# Patient Record
Sex: FEMALE | Race: WHITE | NOT HISPANIC OR LATINO
[De-identification: names, ages, dates, MRNs, and addresses within clinical notes are randomized per-mention and may not be internally consistent; named-entity substitution may affect disease eponyms.]

---

## 2024-01-02 ENCOUNTER — APPOINTMENT (OUTPATIENT)
Dept: RADIOLOGY | Facility: CLINIC | Age: 69
End: 2024-01-02
Payer: MEDICARE

## 2024-01-02 PROCEDURE — 74240 X-RAY XM UPR GI TRC 1CNTRST: CPT

## 2024-02-01 ENCOUNTER — APPOINTMENT (OUTPATIENT)
Dept: OBGYN | Facility: CLINIC | Age: 69
End: 2024-02-01

## 2024-02-15 ENCOUNTER — NON-APPOINTMENT (OUTPATIENT)
Age: 69
End: 2024-02-15

## 2024-03-07 ENCOUNTER — INPATIENT (INPATIENT)
Facility: HOSPITAL | Age: 69
LOS: 4 days | Discharge: HOME | DRG: 198 | End: 2024-03-12
Attending: INTERNAL MEDICINE | Admitting: INTERNAL MEDICINE
Payer: MEDICARE

## 2024-03-07 VITALS
HEIGHT: 63 IN | WEIGHT: 199.96 LBS | DIASTOLIC BLOOD PRESSURE: 83 MMHG | RESPIRATION RATE: 18 BRPM | SYSTOLIC BLOOD PRESSURE: 149 MMHG | TEMPERATURE: 98 F | HEART RATE: 121 BPM | OXYGEN SATURATION: 93 %

## 2024-03-07 DIAGNOSIS — M06.9 RHEUMATOID ARTHRITIS, UNSPECIFIED: ICD-10-CM

## 2024-03-07 DIAGNOSIS — G25.81 RESTLESS LEGS SYNDROME: ICD-10-CM

## 2024-03-07 DIAGNOSIS — E03.9 HYPOTHYROIDISM, UNSPECIFIED: ICD-10-CM

## 2024-03-07 DIAGNOSIS — J18.9 PNEUMONIA, UNSPECIFIED ORGANISM: ICD-10-CM

## 2024-03-07 DIAGNOSIS — K21.9 GASTRO-ESOPHAGEAL REFLUX DISEASE WITHOUT ESOPHAGITIS: ICD-10-CM

## 2024-03-07 DIAGNOSIS — Z29.9 ENCOUNTER FOR PROPHYLACTIC MEASURES, UNSPECIFIED: ICD-10-CM

## 2024-03-07 DIAGNOSIS — I10 ESSENTIAL (PRIMARY) HYPERTENSION: ICD-10-CM

## 2024-03-07 LAB
ADD ON TEST-SPECIMEN IN LAB: SIGNIFICANT CHANGE UP
ANION GAP SERPL CALC-SCNC: 15 MMOL/L — SIGNIFICANT CHANGE UP (ref 5–17)
BASOPHILS # BLD AUTO: 0.1 K/UL — SIGNIFICANT CHANGE UP (ref 0–0.2)
BASOPHILS NFR BLD AUTO: 1 % — SIGNIFICANT CHANGE UP (ref 0–2)
BUN SERPL-MCNC: 15 MG/DL — SIGNIFICANT CHANGE UP (ref 7–23)
CALCIUM SERPL-MCNC: 9.7 MG/DL — SIGNIFICANT CHANGE UP (ref 8.4–10.5)
CHLORIDE SERPL-SCNC: 102 MMOL/L — SIGNIFICANT CHANGE UP (ref 96–108)
CO2 SERPL-SCNC: 24 MMOL/L — SIGNIFICANT CHANGE UP (ref 22–31)
CREAT SERPL-MCNC: 0.83 MG/DL — SIGNIFICANT CHANGE UP (ref 0.5–1.3)
EGFR: 76 ML/MIN/1.73M2 — SIGNIFICANT CHANGE UP
EOSINOPHIL # BLD AUTO: 0 K/UL — SIGNIFICANT CHANGE UP (ref 0–0.5)
EOSINOPHIL NFR BLD AUTO: 0 % — SIGNIFICANT CHANGE UP (ref 0–6)
FLUAV AG NPH QL: SIGNIFICANT CHANGE UP
FLUBV AG NPH QL: SIGNIFICANT CHANGE UP
GLUCOSE SERPL-MCNC: 126 MG/DL — HIGH (ref 70–99)
HCT VFR BLD CALC: 44.8 % — SIGNIFICANT CHANGE UP (ref 34.5–45)
HGB BLD-MCNC: 14.2 G/DL — SIGNIFICANT CHANGE UP (ref 11.5–15.5)
LACTATE SERPL-SCNC: 1.6 MMOL/L — SIGNIFICANT CHANGE UP (ref 0.5–2)
LYMPHOCYTES # BLD AUTO: 1.42 K/UL — SIGNIFICANT CHANGE UP (ref 1–3.3)
LYMPHOCYTES # BLD AUTO: 14.4 % — SIGNIFICANT CHANGE UP (ref 13–44)
MCHC RBC-ENTMCNC: 28.2 PG — SIGNIFICANT CHANGE UP (ref 27–34)
MCHC RBC-ENTMCNC: 31.7 GM/DL — LOW (ref 32–36)
MCV RBC AUTO: 89.1 FL — SIGNIFICANT CHANGE UP (ref 80–100)
MONOCYTES # BLD AUTO: 0.38 K/UL — SIGNIFICANT CHANGE UP (ref 0–0.9)
MONOCYTES NFR BLD AUTO: 3.8 % — SIGNIFICANT CHANGE UP (ref 2–14)
NEUTROPHILS # BLD AUTO: 7.97 K/UL — HIGH (ref 1.8–7.4)
NEUTROPHILS NFR BLD AUTO: 80.8 % — HIGH (ref 43–77)
PLATELET # BLD AUTO: 466 K/UL — HIGH (ref 150–400)
POTASSIUM SERPL-MCNC: 4 MMOL/L — SIGNIFICANT CHANGE UP (ref 3.5–5.3)
POTASSIUM SERPL-SCNC: 4 MMOL/L — SIGNIFICANT CHANGE UP (ref 3.5–5.3)
PROCALCITONIN SERPL-MCNC: 0.1 NG/ML — SIGNIFICANT CHANGE UP (ref 0.02–0.1)
RAPID RVP RESULT: SIGNIFICANT CHANGE UP
RBC # BLD: 5.03 M/UL — SIGNIFICANT CHANGE UP (ref 3.8–5.2)
RBC # FLD: 21.5 % — HIGH (ref 10.3–14.5)
RSV RNA NPH QL NAA+NON-PROBE: SIGNIFICANT CHANGE UP
SARS-COV-2 RNA SPEC QL NAA+PROBE: SIGNIFICANT CHANGE UP
SARS-COV-2 RNA SPEC QL NAA+PROBE: SIGNIFICANT CHANGE UP
SODIUM SERPL-SCNC: 141 MMOL/L — SIGNIFICANT CHANGE UP (ref 135–145)
WBC # BLD: 9.87 K/UL — SIGNIFICANT CHANGE UP (ref 3.8–10.5)
WBC # FLD AUTO: 9.87 K/UL — SIGNIFICANT CHANGE UP (ref 3.8–10.5)

## 2024-03-07 PROCEDURE — 99285 EMERGENCY DEPT VISIT HI MDM: CPT

## 2024-03-07 PROCEDURE — 71275 CT ANGIOGRAPHY CHEST: CPT | Mod: 26,MC

## 2024-03-07 PROCEDURE — 93010 ELECTROCARDIOGRAM REPORT: CPT

## 2024-03-07 PROCEDURE — 71045 X-RAY EXAM CHEST 1 VIEW: CPT | Mod: 26

## 2024-03-07 RX ORDER — PIPERACILLIN AND TAZOBACTAM 4; .5 G/20ML; G/20ML
3.38 INJECTION, POWDER, LYOPHILIZED, FOR SOLUTION INTRAVENOUS ONCE
Refills: 0 | Status: COMPLETED | OUTPATIENT
Start: 2024-03-07 | End: 2024-03-07

## 2024-03-07 RX ORDER — SODIUM CHLORIDE 9 MG/ML
1600 INJECTION INTRAMUSCULAR; INTRAVENOUS; SUBCUTANEOUS ONCE
Refills: 0 | Status: COMPLETED | OUTPATIENT
Start: 2024-03-07 | End: 2024-03-07

## 2024-03-07 RX ORDER — GABAPENTIN 400 MG/1
600 CAPSULE ORAL ONCE
Refills: 0 | Status: COMPLETED | OUTPATIENT
Start: 2024-03-07 | End: 2024-03-07

## 2024-03-07 RX ORDER — CEFTRIAXONE 500 MG/1
2000 INJECTION, POWDER, FOR SOLUTION INTRAMUSCULAR; INTRAVENOUS ONCE
Refills: 0 | Status: COMPLETED | OUTPATIENT
Start: 2024-03-07 | End: 2024-03-07

## 2024-03-07 RX ORDER — PANTOPRAZOLE SODIUM 20 MG/1
40 TABLET, DELAYED RELEASE ORAL
Refills: 0 | Status: DISCONTINUED | OUTPATIENT
Start: 2024-03-07 | End: 2024-03-12

## 2024-03-07 RX ORDER — LOSARTAN POTASSIUM 100 MG/1
100 TABLET, FILM COATED ORAL EVERY 24 HOURS
Refills: 0 | Status: DISCONTINUED | OUTPATIENT
Start: 2024-03-08 | End: 2024-03-12

## 2024-03-07 RX ORDER — ENOXAPARIN SODIUM 100 MG/ML
40 INJECTION SUBCUTANEOUS EVERY 24 HOURS
Refills: 0 | Status: DISCONTINUED | OUTPATIENT
Start: 2024-03-08 | End: 2024-03-10

## 2024-03-07 RX ORDER — AMITRIPTYLINE HCL 25 MG
100 TABLET ORAL DAILY
Refills: 0 | Status: DISCONTINUED | OUTPATIENT
Start: 2024-03-07 | End: 2024-03-12

## 2024-03-07 RX ORDER — GABAPENTIN 400 MG/1
300 CAPSULE ORAL AT BEDTIME
Refills: 0 | Status: DISCONTINUED | OUTPATIENT
Start: 2024-03-07 | End: 2024-03-07

## 2024-03-07 RX ORDER — GABAPENTIN 400 MG/1
900 CAPSULE ORAL AT BEDTIME
Refills: 0 | Status: DISCONTINUED | OUTPATIENT
Start: 2024-03-08 | End: 2024-03-11

## 2024-03-07 RX ORDER — PIPERACILLIN AND TAZOBACTAM 4; .5 G/20ML; G/20ML
3.38 INJECTION, POWDER, LYOPHILIZED, FOR SOLUTION INTRAVENOUS EVERY 8 HOURS
Refills: 0 | Status: DISCONTINUED | OUTPATIENT
Start: 2024-03-08 | End: 2024-03-12

## 2024-03-07 RX ORDER — ACETAMINOPHEN 500 MG
1000 TABLET ORAL ONCE
Refills: 0 | Status: COMPLETED | OUTPATIENT
Start: 2024-03-07 | End: 2024-03-07

## 2024-03-07 RX ORDER — ACETAMINOPHEN 500 MG
650 TABLET ORAL EVERY 6 HOURS
Refills: 0 | Status: DISCONTINUED | OUTPATIENT
Start: 2024-03-07 | End: 2024-03-12

## 2024-03-07 RX ORDER — AZITHROMYCIN 500 MG/1
500 TABLET, FILM COATED ORAL ONCE
Refills: 0 | Status: COMPLETED | OUTPATIENT
Start: 2024-03-07 | End: 2024-03-07

## 2024-03-07 RX ORDER — LEVOTHYROXINE SODIUM 125 MCG
50 TABLET ORAL DAILY
Refills: 0 | Status: DISCONTINUED | OUTPATIENT
Start: 2024-03-08 | End: 2024-03-12

## 2024-03-07 RX ORDER — HYDROXYCHLOROQUINE SULFATE 200 MG
200 TABLET ORAL EVERY 12 HOURS
Refills: 0 | Status: DISCONTINUED | OUTPATIENT
Start: 2024-03-08 | End: 2024-03-09

## 2024-03-07 RX ORDER — ROPINIROLE 8 MG/1
1 TABLET, FILM COATED, EXTENDED RELEASE ORAL AT BEDTIME
Refills: 0 | Status: DISCONTINUED | OUTPATIENT
Start: 2024-03-07 | End: 2024-03-08

## 2024-03-07 RX ORDER — ONDANSETRON 8 MG/1
4 TABLET, FILM COATED ORAL EVERY 8 HOURS
Refills: 0 | Status: DISCONTINUED | OUTPATIENT
Start: 2024-03-07 | End: 2024-03-12

## 2024-03-07 RX ADMIN — SODIUM CHLORIDE 1600 MILLILITER(S): 9 INJECTION INTRAMUSCULAR; INTRAVENOUS; SUBCUTANEOUS at 16:03

## 2024-03-07 RX ADMIN — CEFTRIAXONE 100 MILLIGRAM(S): 500 INJECTION, POWDER, FOR SOLUTION INTRAMUSCULAR; INTRAVENOUS at 15:59

## 2024-03-07 RX ADMIN — PIPERACILLIN AND TAZOBACTAM 200 GRAM(S): 4; .5 INJECTION, POWDER, LYOPHILIZED, FOR SOLUTION INTRAVENOUS at 19:59

## 2024-03-07 RX ADMIN — GABAPENTIN 300 MILLIGRAM(S): 400 CAPSULE ORAL at 23:28

## 2024-03-07 RX ADMIN — Medication 400 MILLIGRAM(S): at 16:00

## 2024-03-07 RX ADMIN — AZITHROMYCIN 255 MILLIGRAM(S): 500 TABLET, FILM COATED ORAL at 16:28

## 2024-03-07 NOTE — ED ADULT NURSE NOTE - NSICDXPASTSURGICALHX_GEN_ALL_CORE_FT
PAST SURGICAL HISTORY:  Acquired absence of genital organ H/O oophorectomy    Endoscopy     lumpectomy left 3/11   right 2002     Other acquired absence of organ S/P cholecystectomy    Other postprocedural status H/O lumpectomy    Other postprocedural status cervical    Ovarian Cystectomy     S/P Cholecystectomy

## 2024-03-07 NOTE — H&P ADULT - PROBLEM SELECTOR PLAN 7
Plan:  F: s/p 1.6 L NS in the ED   E: replete K<4, Mg<2  N: regular  VTE Prophylaxis: None  GI: pantoprazole   C: Full Code  D: RMF

## 2024-03-07 NOTE — ED PROVIDER NOTE - CLINICAL SUMMARY MEDICAL DECISION MAKING FREE TEXT BOX
sick for a few weeks w pneumonia symptoms. two courses of antibiotics, not improving. had outpatient ct a week ago w multifocal pneumonia, no pe. seems to be getting worse. labs/cultures/cxr/cta ordered. discussed w dr. joya. given ceftriaxone/ azithro. wbc 9. cxr w left sided infiltrate. cta neg for pe but w multifocal pna. dr. joya consulted pulm who saw in ed. rec broaden coverage to zosyn. admit for further treatment

## 2024-03-07 NOTE — ED ADULT TRIAGE NOTE - CHIEF COMPLAINT QUOTE
Pt sent by MD Marin for further eval, diagnosed with L Lower lobe pneumonia 2/17, had CT done on 2/27 showing L upper lobe pnuemonia. Pt finished 2nd z-cody Monday. Denies cp, sob. PMH HTN. Ekg in progress.

## 2024-03-07 NOTE — CONSULT NOTE ADULT - ASSESSMENT
Ms. Schwartz is a 69 F with hx of RA (on plaquenil since NOv 2023), restless leg syndrome on chronic hydrocodone, HTN, goiter, UGIB in nov 2023, large hiatal hernia who comes in for fever, malaise, and nonproductive cough. She reports nonspecific symptoms since mid-February. She started taking PO augmentin and went to urgent care where a CXR was done and showed LLL pneumonia, small effusion on 2/16, s/p augmentin/azithromycin with no improvement. CTA Thorax done 2/27 w/ multifocal patchy infiltrates in the right lung. Received another course of antibiotics with cefdinir and now presents given lack of improvement of fevers, cough, malaise.     Pulmonary consulted given possible pneumonia refractory to 2 courses of antibiotics    # Community acquired pneumonia, LLL  - patient presents w/ LLL infiltrate on CXR 2/16 and mid lung patchy infiltrates on left on CTA 2/27 refractory to 2 courses of PO antibiotics. She has repeat CT scan pending here but CXR looks improved from prior  - febrile here, no leukocytosis, negative procalcitonin, no lactic acid elevation and no HARMONY  - will follow up CT findings for further recommendations but given lack of improvement with PO antibiotics, would provide broader coverage w/ IV antibiotics (IV zosyn), obtain urine strep, legionella, MRSA naris and sputum culture to help guide therapy. RVP/COVID/RSV testing, blood cultures and UA  - if infectious work up negative but with persistent CT scan findings, would start to think of broader differentials such as organizing pneumonia or underlying malignancy given her extensive 60 py smoking hx    will follow up CT scan for further recommendations   Ms. Schwartz is a 69 F with hx of RA (on plaquenil since NOv 2023), restless leg syndrome on chronic hydrocodone, HTN, goiter, UGIB in nov 2023, large hiatal hernia who comes in for fever, malaise, and nonproductive cough. She reports nonspecific symptoms since mid-February. She started taking PO augmentin and went to urgent care where a CXR was done and showed LLL pneumonia, small effusion on 2/16, s/p augmentin/azithromycin with no improvement. CTA Thorax done 2/27 w/ multifocal patchy infiltrates in the right lung. Received another course of antibiotics with cefdinir and now presents given lack of improvement of fevers, cough, malaise.     Pulmonary consulted given possible pneumonia refractory to 2 courses of antibiotics    # Community acquired pneumonia, LLL  - patient presents w/ LLL infiltrate on CXR 2/16 and mid lung patchy infiltrates on left on CTA 2/27 refractory to 2 courses of PO antibiotics.  CXR looks improved from prior. CTA thorax no PE but DAMIEN peripheral consolidation small  - febrile here, no leukocytosis, negative procalcitonin, no lactic acid elevation and no HARMONY  - Cannot compare CT findings to prior as we do not have access but reported left lower lobe findings appear improved. Given lack of improvement with PO antibiotics, would provide broader coverage w/ IV antibiotics (IV zosyn), obtain urine strep, legionella, MRSA naris and sputum culture to help guide therapy. RVP/COVID/RSV testing, blood cultures and UA  - if infectious work up negative but with persistent CT scan findings, would start to think of broader differentials such as organizing pneumonia or underlying malignancy given her extensive 60 py smoking hx. Would obtain ESR and CRP. Consider organizing pneumonia given peripheral consolidation finding in the DAMIEN   Ms. Schwartz is a 69 F with hx of RA (on plaquenil since NOv 2023), restless leg syndrome on chronic hydrocodone, HTN, goiter, UGIB in nov 2023, large hiatal hernia who comes in for fever, malaise, and nonproductive cough. She reports nonspecific symptoms since mid-February. She started taking PO augmentin and went to urgent care where a CXR was done and showed LLL pneumonia, small effusion on 2/16, s/p augmentin/azithromycin with no improvement. CTA Thorax done 2/27 w/ multifocal patchy infiltrates in the right lung. Received another course of antibiotics with cefdinir and now presents given lack of improvement of fevers, cough, malaise.     Pulmonary consulted given possible pneumonia refractory to 2 courses of antibiotics    # Community acquired pneumonia, DAMIEN  - patient presents w/ LLL infiltrate on CXR 2/16 and mid lung patchy infiltrates on left on CTA 2/27 refractory to 2 courses of PO antibiotics.  CXR looks improved from prior. CTA thorax no PE but DAMIEN peripheral consolidation small  - febrile here, no leukocytosis, negative procalcitonin, no lactic acid elevation and no HARMONY  - Cannot compare CT findings to prior as we do not have access but reported left lower lobe findings appear improved. Given lack of improvement with PO antibiotics, would provide broader coverage w/ IV antibiotics (IV zosyn), obtain urine strep, legionella, MRSA naris and sputum culture to help guide therapy. RVP/COVID/RSV testing, blood cultures and UA  - if infectious work up negative but with persistent CT scan findings, would start to think of broader differentials such as organizing pneumonia or underlying malignancy given her extensive 60 py smoking hx. Would obtain ESR and CRP. Consider organizing pneumonia given peripheral consolidation finding in the DAMIEN   Ms. Schwartz is a 69 F with hx of RA (on plaquenil since NOv 2023), restless leg syndrome on chronic hydrocodone, HTN, goiter, UGIB in nov 2023, large hiatal hernia who comes in for fever, malaise, and nonproductive cough. She reports nonspecific symptoms since mid-February. She started taking PO augmentin and went to urgent care where a CXR was done and showed LLL pneumonia, small effusion on 2/16, s/p augmentin/azithromycin with no improvement. CTA Thorax done 2/27 w/ multifocal patchy infiltrates in the right lung. Received another course of antibiotics with cefdinir and now presents given lack of improvement of fevers, cough, malaise.     Pulmonary consulted given possible pneumonia refractory to 2 courses of antibiotics    # Community acquired pneumonia, Lingula  - patient presents w/ LLL infiltrate on CXR 2/16 and mid lung patchy infiltrates on left on CTA 2/27 refractory to 2 courses of PO antibiotics.  CXR looks improved from prior. CTA thorax no PE but DAMIEN peripheral consolidation small  - febrile here, no leukocytosis, negative procalcitonin, no lactic acid elevation and no HARMONY  - Cannot compare CT findings to prior as we do not have access but reported left lower lobe findings appear improved. Given lack of improvement with PO antibiotics, would provide broader coverage w/ IV antibiotics (IV zosyn), obtain urine strep, legionella, MRSA naris and sputum culture to help guide therapy. RVP/COVID/RSV testing, blood cultures and UA  - if infectious work up negative with no symptomatic improvement w/ IV antibiotics but with persistent CT scan findings, would start to think of broader differentials such as organizing pneumonia or underlying malignancy given her extensive 60 py smoking hx. Would obtain ESR and CRP. Consider organizing pneumonia given peripheral consolidation finding in the DAMIEN   Ms. Schwartz is a 69 F with hx of RA (on plaquenil since NOv 2023), restless leg syndrome on chronic hydrocodone, HTN, goiter, UGIB in nov 2023, large hiatal hernia who comes in for fever, malaise, and nonproductive cough. She reports nonspecific symptoms since mid-February. She started taking PO augmentin and went to urgent care where a CXR was done and showed LLL pneumonia, small effusion on 2/16, s/p augmentin/azithromycin with no improvement. CTA Thorax done 2/27 w/ multifocal patchy infiltrates in the left lung. Received another course of antibiotics with cefdinir and now presents given lack of improvement of fevers, cough, malaise.     Pulmonary consulted given possible pneumonia refractory to 2 courses of antibiotics    Data Review:  negative RVP, COVID, RSV, Flu testing  CRP 5.2  procal 0.1  blood cultures NGTD  urine strep and legionella negative  MRSA naris pending    # Community acquired pneumonia, Lingula vs. Organizing Pneumonia  - patient presents w/ LLL infiltrate on CXR 2/16 and mid lung patchy infiltrates on left on CTA 2/27 refractory to 2 courses of PO antibiotics.  CXR looks improved from prior. CTA thorax no PE but DAMIEN peripheral consolidation small  - low grade fever here, no leukocytosis, negative procalcitonin, CRP 5.2 (slightly elevated)  - if infectious work up negative with no symptomatic improvement w/ IV antibiotics but with persistent CT scan findings, would start to think of broader differentials such as organizing pneumonia.   - will add patient on for bronchscospy with  BAL and transbronchial bx of Lingula on MONDAY  - obtain coags, NPO at midnight on Sunday night, hold DVT ppx monday AM    S/.D/E with Dr. Sood

## 2024-03-07 NOTE — PATIENT PROFILE ADULT - FALL HARM RISK - UNIVERSAL INTERVENTIONS
Bed in lowest position, wheels locked, appropriate side rails in place/Call bell, personal items and telephone in reach/Instruct patient to call for assistance before getting out of bed or chair/Non-slip footwear when patient is out of bed/[place] to call system/Physically safe environment - no spills, clutter or unnecessary equipment/Purposeful Proactive Rounding/Room/bathroom lighting operational, light cord in reach

## 2024-03-07 NOTE — H&P ADULT - HISTORY OF PRESENT ILLNESS
69F PMH RA (on plaquenil since 11/ 2023), restless leg syndrome on chronic hydrocodone, HTN, goiter, UGIB in nov 2023, hypothyroidism, presents with fever, malaise, nonproductive cough and SOB. She reports cough/SOB/mailaise started mid-February. Prescribed PO augmentin, CXR at urgent care: LLL pneumonia, small effusion. So far, she has take augmentin and azithromycin for 7 day course, followed by 7d course cefdinir which "kept her symptoms at bay" but otherwise no improvement. Presented to PCP Dr. Marin who recommended CTA thorax which was done 2/27 and showed no PE, multifocal patchy infiltrates in the left mid lung consistent with multifocal pneumonia.     Patient reports fevers 1d duration, no sick contacts,dry cough w/o productive sputum. Denies recent international travel.  LE swelling chronic R >L. She is not on any immunosupressive for RA other than plaquenil since Nov 2023 as she was was recently diagnosed. She has a 60 pack year smoking hx, quit 25 years ago, and no other lung hx. NO history of pneumoina in the past. NO weight loss or hemoptysis. Family hx of lung cancer in father. Family hx of DVT/PE in son.     ED:   VS: 100.2, , O2 93% on room air, /83.   Labs: WBC 9.8, H/H: 14/44, Plts 466, electrolytes wnl. lactate 1.6, RSV-  Imaging: CT PE: groundglass opacities and small consolidations in all 5 lobes, consolidative in L lobe  Intervention NS 1.6L bolus, ctx, azithromycin, zosyn

## 2024-03-07 NOTE — ED PROVIDER NOTE - OBJECTIVE STATEMENT
history of htn, ra, hypothyroid, restless leg syndrome, here with pneumonia/sob. Reports she started to feel sick 2/17 and went to urgent care. Diagnosed with pneumonia in LLL, treated with zpak/ amoxicillin. Completed 10 day course, didn't feel better. PMD Dr. Marin sent her for cta to r/o pe on 2/27 which was neg for pe but showed multifocal pneumonia left lung. Started new abx (thinks cefdinir) which she finished Sunday. Still not better, feeling sob, chest pains on left/ back, more with deep breath. Saw him again today and sent to ED. Had chills this am, no fever. Mild cough non productive.

## 2024-03-07 NOTE — H&P ADULT - NSHPPHYSICALEXAM_GEN_ALL_CORE
PHYSICAL EXAM:  Constitutional: NAD, comfortable in bed.  HEENT: NC/AT, PERRLA, MMM  Respiratory: CTA B/L. No w/r/r.   Cardiovascular: RRR, normal S1 and S2, no m/r/g.   Gastrointestinal: +BS, soft NTND, no guarding or rebound tenderness, no palpable masses   Extremities: wwp; 1+ pitting edema or RLE (chronic)   Neurological: AAOx3, no CN deficits, strength and sensation intact throughout.   Skin: No gross skin abnormalities or rashes

## 2024-03-07 NOTE — ED ADULT NURSE NOTE - NSICDXPASTMEDICALHX_GEN_ALL_CORE_FT
PAST MEDICAL HISTORY:  Brachial neuritis Cervical radiculitis    Disorder of thyroid low thyroid    GERD (Gastroesophageal Reflux Disease)     Goiter mulit nodular goiter.    Hiatal Hernia     HTN (Hypertension), Benign     Hypothyroid     Lung Nodule  followed up with CT SCAN yearly     Personal history of other diseases of circulatory system H/O: hypertension

## 2024-03-07 NOTE — CONSULT NOTE ADULT - SUBJECTIVE AND OBJECTIVE BOX
PULMONARY SERVICE INITIAL CONSULT NOTE    HPI:    Ms. Schwartz is a 69 F with hx of RA (on plaquenil since NOv 2023), restless leg syndrome on chronic hydrocodone, HTN, goiter, UGIB in nov 2023, large hiatal hernia who comes in for fever, malaise, and nonproductive cough. She reports nonspecific symptoms since mid-February. She started taking PO augmentin and went to urgent care where a CXR was done and showed LLL pneumonia, small effusion. She was started on augmentin and azithromycin for 7 day course at that time with no improvement in her symptoms. She then saw her PCP Dr. Marin who recommended CTA thorax which was done 2/27 and showed no PE, multifocal patchy infiltrates in the left mid lung consistent with multifocal pneumonia. She was started on an abx for 5 days (cefdinir) but had no improvement so he sent her in for evaluation.     Patient reports fevers, no sick contacts,dry cough w/o productive sputum. She reports chest discomfort on the left side. LE swelling chronic R >L. She is not on any immunosupressive for RA other than plaquenil since Nov 2023 as she was was recently diagnosed. She has a 60 pack year smoking hx, quit 25 years ago, and no other lung hx. NO history of pneumoina in the past. NO weight loss or hemoptysis.     In the ED, she was febrile 100.2, Tachycardic 118, saturating 93% on room air and /83. Blood work pending but no leukocytosis or lactic acidosis. CXR and CTA thorax pending.    Pulmonary is consulted for concern for pneumonia, refractory to 2 courses of antibiotics.     Does not follow pulmonary as outpatient. From [place] and [facility] Marin for second opinion.     REVIEW OF SYSTEMS:  10 point ROS negative aside from what is mentioned in HPI    PAST MEDICAL & SURGICAL HISTORY:  HTN (Hypertension), Benign      Hypothyroid      GERD (Gastroesophageal Reflux Disease)      Hiatal Hernia      Lung Nodule  followed up with CT SCAN yearly      Brachial neuritis  Cervical radiculitis      Goiter  mulit nodular goiter.      Disorder of thyroid  low thyroid      Personal history of other diseases of circulatory system  H/O: hypertension      S/P Cholecystectomy      lumpectomy left 3/11   right 2002      Endoscopy      Ovarian Cystectomy      Acquired absence of genital organ  H/O oophorectomy      Other postprocedural status  H/O lumpectomy      Other acquired absence of organ  S/P cholecystectomy      Other postprocedural status  cervical          FAMILY HISTORY:  Family history of diabetes mellitus  Family history of diabetes mellitus        SOCIAL HISTORY:  Smoking Status: [ ] Current, [ ] Former, [ ] Never  Pack Years:    MEDICATIONS:  Pulmonary:    Antimicrobials:  azithromycin  IVPB 500 milliGRAM(s) IV Intermittent once    Anticoagulants:    Onc:    GI/:    Endocrine:    Cardiac:    Other Medications:      Allergies    No Known Allergies    Intolerances        Vital Signs Last 24 Hrs  T(C): 37.9 (07 Mar 2024 15:17), Max: 37.9 (07 Mar 2024 15:17)  T(F): 100.2 (07 Mar 2024 15:17), Max: 100.2 (07 Mar 2024 15:17)  HR: 118 (07 Mar 2024 14:46) (118 - 118)  BP: 149/83 (07 Mar 2024 14:46) (149/83 - 149/83)  BP(mean): --  RR: 18 (07 Mar 2024 14:46) (18 - 18)  SpO2: 93% (07 Mar 2024 14:46) (93% - 93%)    Parameters below as of 07 Mar 2024 14:46  Patient On (Oxygen Delivery Method): room air              PHYSICAL EXAM:  Constitutional: well-appearing, in no apparent distress  Head: NC/AT  EENT: PERRL, anicteric sclera; oropharynx clear with moist mucous membranes  Neck: supple, ROM intact, no appreciable JVD or LAD  Respiratory: Lungs clear to auscultation bilaterally, no wheezes, rhonchi or rales  Cardiovascular: +S1/S2 intact, regular rhythm and rate. No murmurs appreciated  Gastrointestinal: soft, non-tender, non distended, bowel sounds normoactive   Extremities: no edema, clubbing or cyanosis  Vascular: 2+ radial pulses B/L  Neurological: awake and alert; oriented with no appreciable focal neuro defecits    LABS:      CBC Full  -  ( 07 Mar 2024 14:56 )  WBC Count : 9.87 K/uL  RBC Count : 5.03 M/uL  Hemoglobin : 14.2 g/dL  Hematocrit : 44.8 %  Platelet Count - Automated : 466 K/uL  Mean Cell Volume : 89.1 fl  Mean Cell Hemoglobin : 28.2 pg  Mean Cell Hemoglobin Concentration : 31.7 gm/dL  Auto Neutrophil # : x  Auto Lymphocyte # : x  Auto Monocyte # : x  Auto Eosinophil # : x  Auto Basophil # : x  Auto Neutrophil % : x  Auto Lymphocyte % : x  Auto Monocyte % : x  Auto Eosinophil % : x  Auto Basophil % : x                            RADIOLOGY & ADDITIONAL STUDIES:  CXR pending  CTA thorax pending PULMONARY SERVICE INITIAL CONSULT NOTE    HPI:    Ms. Schwartz is a 69 F with hx of RA (on plaquenil since NOv 2023), restless leg syndrome on chronic hydrocodone, HTN, goiter, UGIB in nov 2023, large hiatal hernia who comes in for fever, malaise, and nonproductive cough. She reports nonspecific symptoms since mid-February. She started taking PO augmentin and went to urgent care where a CXR was done and showed LLL pneumonia, small effusion. She was started on augmentin and azithromycin for 7 day course at that time with no improvement in her symptoms. She then saw her PCP Dr. Marin who recommended CTA thorax which was done 2/27 and showed no PE, multifocal patchy infiltrates in the left mid lung consistent with multifocal pneumonia. She was started on an abx for 5 days (cefdinir) but had no improvement so he sent her in for evaluation.     Patient reports fevers, no sick contacts,dry cough w/o productive sputum. She reports chest discomfort on the left side. LE swelling chronic R >L. She is not on any immunosupressive for RA other than plaquenil since Nov 2023 as she was was recently diagnosed. She has a 60 pack year smoking hx, quit 25 years ago, and no other lung hx. NO history of pneumoina in the past. NO weight loss or hemoptysis. Family hx of lung cancer in father. Family hx of DVT/PE in son at yougn age.     In the ED, she was febrile 100.2, Tachycardic 118, saturating 93% on room air and /83. Blood work pending but no leukocytosis or lactic acidosis. CXR and CTA thorax pending.    Pulmonary is consulted for concern for pneumonia, refractory to 2 courses of antibiotics.     Does not follow pulmonary as outpatient. From [place] and only up in [facility] to see Dr. Marin for second opinion.     REVIEW OF SYSTEMS:  10 point ROS negative aside from what is mentioned in HPI    PAST MEDICAL & SURGICAL HISTORY:  HTN (Hypertension), Benign  Hypothyroid  GERD (Gastroesophageal Reflux Disease)  Hiatal Hernia  Lung Nodule  followed up with CT SCAN yearly  Brachial neuritis  Cervical radiculitis  Goiter  mulit nodular goiter.  Disorder of thyroid  low thyroid  Personal history of other diseases of circulatory system  H/O: hypertension  S/P Cholecystectomy  lumpectomy left 3/11   right 2002    Endoscopy  Ovarian Cystectomy  Acquired absence of genital organ  H/O oophorectomy  Other postprocedural status  H/O lumpectomy  Other acquired absence of organ  S/P cholecystectomy  Other postprocedural status  cervical          FAMILY HISTORY:  Family history of diabetes mellitus  Family history of diabetes mellitus        SOCIAL HISTORY:  Smoking Status: [ ] Current, [x ] Former, [ ] Never  Pack Years:    MEDICATIONS:  Pulmonary:    Antimicrobials:  azithromycin  IVPB 500 milliGRAM(s) IV Intermittent once    Anticoagulants:    Onc:    GI/:    Endocrine:    Cardiac:    Other Medications:      Allergies    No Known Allergies    Intolerances        Vital Signs Last 24 Hrs  T(C): 37.9 (07 Mar 2024 15:17), Max: 37.9 (07 Mar 2024 15:17)  T(F): 100.2 (07 Mar 2024 15:17), Max: 100.2 (07 Mar 2024 15:17)  HR: 118 (07 Mar 2024 14:46) (118 - 118)  BP: 149/83 (07 Mar 2024 14:46) (149/83 - 149/83)  BP(mean): --  RR: 18 (07 Mar 2024 14:46) (18 - 18)  SpO2: 93% (07 Mar 2024 14:46) (93% - 93%)    Parameters below as of 07 Mar 2024 14:46  Patient On (Oxygen Delivery Method): room air        PHYSICAL EXAM:  Constitutional: well-appearing, in no apparent distress  Head: NC/AT  EENT: PERRL, anicteric sclera; oropharynx clear with moist mucous membranes  Neck: supple, ROM intact, no appreciable JVD or LAD  Respiratory: Lungs with crackles breath sounds in the right lower base. otherwise clear to auscultation bilaterally, no wheezes, rhonchi or rales  Cardiovascular: +S1/S2 intact, regular rhythm and rate. No murmurs appreciated  Gastrointestinal: soft, non-tender, non distended, bowel sounds normoactive   Extremities: no edema, clubbing or cyanosis  Vascular: 2+ radial pulses B/L  Neurological: awake and alert; oriented with no appreciable focal neuro defecits    LABS:  CBC Full  -  ( 07 Mar 2024 14:56 )  WBC Count : 9.87 K/uL  RBC Count : 5.03 M/uL  Hemoglobin : 14.2 g/dL  Hematocrit : 44.8 %  Platelet Count - Automated : 466 K/uL  Mean Cell Volume : 89.1 fl  Mean Cell Hemoglobin : 28.2 pg  Mean Cell Hemoglobin Concentration : 31.7 gm/dL  Auto Neutrophil # : x  Auto Lymphocyte # : x  Auto Monocyte # : x  Auto Eosinophil # : x  Auto Basophil # : x  Auto Neutrophil % : x  Auto Lymphocyte % : x  Auto Monocyte % : x  Auto Eosinophil % : x  Auto Basophil % : x                RADIOLOGY & ADDITIONAL STUDIES:  CXR pending read but LLL infiltrate appears improved compared to prior  CTA thorax pending PULMONARY SERVICE INITIAL CONSULT NOTE    HPI:    Ms. Schwartz is a 69 F with hx of RA (on plaquenil since NOv 2023), restless leg syndrome on chronic hydrocodone, HTN, goiter, UGIB in nov 2023, large hiatal hernia who comes in for fever, malaise, and nonproductive cough. She reports nonspecific symptoms since mid-February. She started taking PO augmentin and went to urgent care where a CXR was done and showed LLL pneumonia, small effusion. She was started on augmentin and azithromycin for 7 day course at that time with no improvement in her symptoms. She then saw her PCP Dr. Marin who recommended CTA thorax which was done 2/27 and showed no PE, multifocal patchy infiltrates in the left mid lung consistent with multifocal pneumonia. She was started on an abx for 5 days (cefdinir) but had no improvement so he sent her in for evaluation.     Patient reports fevers, no sick contacts,dry cough w/o productive sputum. She reports chest discomfort on the left side. LE swelling chronic R >L. She is not on any immunosupressive for RA other than plaquenil since Nov 2023 as she was was recently diagnosed. She has a 60 pack year smoking hx, quit 25 years ago, and no other lung hx. NO history of pneumoina in the past. NO weight loss or hemoptysis. Family hx of lung cancer in father. Family hx of DVT/PE in son at yougn age.     In the ED, she was febrile 100.2, Tachycardic 118, saturating 93% on room air and /83. Blood work pending but no leukocytosis or lactic acidosis. CXR and CTA thorax pending.    Pulmonary is consulted for concern for pneumonia, refractory to 2 courses of antibiotics.     Does not follow pulmonary as outpatient. [facility] [place] [facility].     REVIEW OF SYSTEMS:  10 point ROS negative aside from what is mentioned in HPI    PAST MEDICAL & SURGICAL HISTORY:  HTN (Hypertension), Benign  Hypothyroid  GERD (Gastroesophageal Reflux Disease)  Hiatal Hernia  Lung Nodule  followed up with CT SCAN yearly  Brachial neuritis  Cervical radiculitis  Goiter  mulit nodular goiter.  Disorder of thyroid  low thyroid  Personal history of other diseases of circulatory system  H/O: hypertension  S/P Cholecystectomy  lumpectomy left 3/11   right 2002    Endoscopy  Ovarian Cystectomy  Acquired absence of genital organ  H/O oophorectomy  Other postprocedural status  H/O lumpectomy  Other acquired absence of organ  S/P cholecystectomy  Other postprocedural status  cervical          FAMILY HISTORY:  Family history of diabetes mellitus  Family history of diabetes mellitus        SOCIAL HISTORY:  Smoking Status: [ ] Current, [x ] Former, [ ] Never  Pack Years:    MEDICATIONS:  Pulmonary:    Antimicrobials:  azithromycin  IVPB 500 milliGRAM(s) IV Intermittent once    Anticoagulants:    Onc:    GI/:    Endocrine:    Cardiac:    Other Medications:      Allergies    No Known Allergies    Intolerances        Vital Signs Last 24 Hrs  T(C): 37.9 (07 Mar 2024 15:17), Max: 37.9 (07 Mar 2024 15:17)  T(F): 100.2 (07 Mar 2024 15:17), Max: 100.2 (07 Mar 2024 15:17)  HR: 118 (07 Mar 2024 14:46) (118 - 118)  BP: 149/83 (07 Mar 2024 14:46) (149/83 - 149/83)  BP(mean): --  RR: 18 (07 Mar 2024 14:46) (18 - 18)  SpO2: 93% (07 Mar 2024 14:46) (93% - 93%)    Parameters below as of 07 Mar 2024 14:46  Patient On (Oxygen Delivery Method): room air        PHYSICAL EXAM:  Constitutional: well-appearing, in no apparent distress  Head: NC/AT  EENT: PERRL, anicteric sclera; oropharynx clear with moist mucous membranes  Neck: supple, ROM intact, no appreciable JVD or LAD  Respiratory: Lungs with crackles breath sounds in the right lower base. otherwise clear to auscultation bilaterally, no wheezes, rhonchi or rales  Cardiovascular: +S1/S2 intact, regular rhythm and rate. No murmurs appreciated  Gastrointestinal: soft, non-tender, non distended, bowel sounds normoactive   Extremities: no edema, clubbing or cyanosis  Vascular: 2+ radial pulses B/L  Neurological: awake and alert; oriented with no appreciable focal neuro defecits    LABS:  CBC Full  -  ( 07 Mar 2024 14:56 )  WBC Count : 9.87 K/uL  RBC Count : 5.03 M/uL  Hemoglobin : 14.2 g/dL  Hematocrit : 44.8 %  Platelet Count - Automated : 466 K/uL  Mean Cell Volume : 89.1 fl  Mean Cell Hemoglobin : 28.2 pg  Mean Cell Hemoglobin Concentration : 31.7 gm/dL  Auto Neutrophil # : x  Auto Lymphocyte # : x  Auto Monocyte # : x  Auto Eosinophil # : x  Auto Basophil # : x  Auto Neutrophil % : x  Auto Lymphocyte % : x  Auto Monocyte % : x  Auto Eosinophil % : x  Auto Basophil % : x                RADIOLOGY & ADDITIONAL STUDIES:  CXR pending read but LLL infiltrate appears improved compared to prior  CTA thorax no PE but has some mild GGO in upper lobes, left upper lobe peripheral consolidations PULMONARY SERVICE INITIAL CONSULT NOTE    HPI:    Ms. Schwartz is a 69 F with hx of RA (on plaquenil since NOv 2023), restless leg syndrome on chronic hydrocodone, HTN, goiter, UGIB in nov 2023, large hiatal hernia who comes in for fever, malaise, and nonproductive cough. She reports nonspecific symptoms since mid-February. She started taking PO augmentin and went to urgent care where a CXR was done and showed LLL pneumonia, small effusion. She was started on augmentin and azithromycin for 7 day course at that time with no improvement in her symptoms. She then saw her PCP Dr. Marin who recommended CTA thorax which was done 2/27 and showed no PE, multifocal patchy infiltrates in the left mid lung consistent with multifocal pneumonia. She was started on an abx for 5 days (cefdinir) but had no improvement so he sent her in for evaluation.     Patient reports fevers, no sick contacts,dry cough w/o productive sputum. She reports chest discomfort on the left side. LE swelling chronic R >L. She is not on any immunosupressive for RA other than plaquenil since Nov 2023 as she was was recently diagnosed. She has a 60 pack year smoking hx, quit 25 years ago, and no other lung hx. NO history of pneumoina in the past. NO weight loss or hemoptysis. Family hx of lung cancer in father. Family hx of DVT/PE in son at yougn age.     In the ED, she was febrile 100.2, Tachycardic 118, saturating 93% on room air and /83. Blood work pending but no leukocytosis or lactic acidosis. CXR and CTA thorax pending.    Pulmonary is consulted for concern for pneumonia, refractory to 2 courses of antibiotics.     Does not follow pulmonary as outpatient. From [place] and only up in [facility] to see Dr. Marin for second opinion.     REVIEW OF SYSTEMS:  10 point ROS negative aside from what is mentioned in HPI    PAST MEDICAL & SURGICAL HISTORY:  HTN (Hypertension), Benign  Hypothyroid  GERD (Gastroesophageal Reflux Disease)  Hiatal Hernia  Lung Nodule  followed up with CT SCAN yearly  Brachial neuritis  Cervical radiculitis  Goiter  mulit nodular goiter.  Disorder of thyroid  low thyroid  Personal history of other diseases of circulatory system  H/O: hypertension  S/P Cholecystectomy  lumpectomy left 3/11   right 2002    Endoscopy  Ovarian Cystectomy  Acquired absence of genital organ  H/O oophorectomy  Other postprocedural status  H/O lumpectomy  Other acquired absence of organ  S/P cholecystectomy  Other postprocedural status  cervical          FAMILY HISTORY:  Family history of diabetes mellitus  Family history of diabetes mellitus        SOCIAL HISTORY:  Smoking Status: [ ] Current, [x ] Former, [ ] Never  Pack Years:    MEDICATIONS:  Pulmonary:    Antimicrobials:  azithromycin  IVPB 500 milliGRAM(s) IV Intermittent once    Anticoagulants:    Onc:    GI/:    Endocrine:    Cardiac:    Other Medications:      Allergies    No Known Allergies    Intolerances        Vital Signs Last 24 Hrs  T(C): 37.9 (07 Mar 2024 15:17), Max: 37.9 (07 Mar 2024 15:17)  T(F): 100.2 (07 Mar 2024 15:17), Max: 100.2 (07 Mar 2024 15:17)  HR: 118 (07 Mar 2024 14:46) (118 - 118)  BP: 149/83 (07 Mar 2024 14:46) (149/83 - 149/83)  BP(mean): --  RR: 18 (07 Mar 2024 14:46) (18 - 18)  SpO2: 93% (07 Mar 2024 14:46) (93% - 93%)    Parameters below as of 07 Mar 2024 14:46  Patient On (Oxygen Delivery Method): room air        PHYSICAL EXAM:  Constitutional: well-appearing, in no apparent distress  Head: NC/AT  EENT: PERRL, anicteric sclera; oropharynx clear with moist mucous membranes  Neck: supple, ROM intact, no appreciable JVD or LAD  Respiratory: Lungs with crackles breath sounds in the right lower base. otherwise clear to auscultation bilaterally, no wheezes, rhonchi or rales  Cardiovascular: +S1/S2 intact, regular rhythm and rate. No murmurs appreciated  Gastrointestinal: soft, non-tender, non distended, bowel sounds normoactive   Extremities: no edema, clubbing or cyanosis  Vascular: 2+ radial pulses B/L  Neurological: awake and alert; oriented with no appreciable focal neuro defecits    LABS:  CBC Full  -  ( 07 Mar 2024 14:56 )  WBC Count : 9.87 K/uL  RBC Count : 5.03 M/uL  Hemoglobin : 14.2 g/dL  Hematocrit : 44.8 %  Platelet Count - Automated : 466 K/uL  Mean Cell Volume : 89.1 fl  Mean Cell Hemoglobin : 28.2 pg  Mean Cell Hemoglobin Concentration : 31.7 gm/dL  Auto Neutrophil # : x  Auto Lymphocyte # : x  Auto Monocyte # : x  Auto Eosinophil # : x  Auto Basophil # : x  Auto Neutrophil % : x  Auto Lymphocyte % : x  Auto Monocyte % : x  Auto Eosinophil % : x  Auto Basophil % : x                RADIOLOGY & ADDITIONAL STUDIES:  CXR pending read but LLL infiltrate appears improved compared to prior  CTA thorax no PE but has some mild GGO in upper lobes, left upper lobe peripheral consolidations, some peripheral consolidations in Right upper lobe as well new from prior report

## 2024-03-07 NOTE — H&P ADULT - ASSESSMENT
69F PMH RA (on plaquenil since 11/ 2023), restless leg syndrome on chronic hydrocodone, HTN, goiter, UGIB in nov 2023, hypothyroidism, presents with fever, malaise, nonproductive cough and SOB, admitted for multifocal PNA refractory to multiple abx regimens outpatient

## 2024-03-07 NOTE — H&P ADULT - PROBLEM SELECTOR PLAN 5
home meds: hydrocodone 10 mg. ropinirole 1 mg , gabapentin 300 mg qhs     -continue home meds. holding hydrocodone

## 2024-03-07 NOTE — ED ADULT NURSE NOTE - CHPI ED NUR RELIEVING FX
Orders in chart    ----- Message from Gretchen Gray sent at 12/15/2022  8:42 AM CST -----  Can you put in her lab orders, she's going tomorrow morning, alicia mckinney  Patient has a wellness appt here on 12/27/22  Thank you    
lying down

## 2024-03-07 NOTE — ED ADULT NURSE NOTE - OBJECTIVE STATEMENT
69YF pmhx lower lobe pneumonia progressed to upper L lobe. Pt completed both rounds of antibiotics without relief of symptoms. Pt currently reports pain with inspiration/expiration, SOB with exertion. Denies F/C, N/V/D.

## 2024-03-07 NOTE — H&P ADULT - NSICDXFAMILYHX_GEN_ALL_CORE_FT
FAMILY HISTORY:  Family history of diabetes mellitus, Family history of diabetes mellitus    Child  Still living? Unknown  Family history of pulmonary embolism, Age at diagnosis: Age Unknown

## 2024-03-07 NOTE — H&P ADULT - PROBLEM SELECTOR PLAN 1
~3 week sx of SOB, dry cough malaise and now with low grade fevers, PNA previously diagnosed 2/2024 and refractory to 2 courses outpatient (z-pack, cefdinir), not worse but w/o improvement. no wbc or bandemia, negative procal, negative RVP. Pulm following, suspicion for organizing PNA or malignancy given longstanding smoking hx however will need to evaluate if improved on broad spectrum first     -F/u Bcx   -IV Zosyn   -ESR/CRP  -Urine strep/legionella   -Pulm recs appreciated

## 2024-03-07 NOTE — ED ADULT NURSE REASSESSMENT NOTE - NS ED NURSE REASSESS COMMENT FT1
Spoke with  reporting the lab is not in possession of procalcitonin or bmp. This RN collected aforementioned blood work and had sent them with the other labs collected at that time. Will recollected blood work at this time.

## 2024-03-08 LAB
ALBUMIN SERPL ELPH-MCNC: 3.8 G/DL — SIGNIFICANT CHANGE UP (ref 3.3–5)
ALP SERPL-CCNC: 99 U/L — SIGNIFICANT CHANGE UP (ref 40–120)
ALT FLD-CCNC: 18 U/L — SIGNIFICANT CHANGE UP (ref 10–45)
ANION GAP SERPL CALC-SCNC: 8 MMOL/L — SIGNIFICANT CHANGE UP (ref 5–17)
APTT BLD: 36.5 SEC — HIGH (ref 24.5–35.6)
AST SERPL-CCNC: 16 U/L — SIGNIFICANT CHANGE UP (ref 10–40)
BASOPHILS # BLD AUTO: 0.03 K/UL — SIGNIFICANT CHANGE UP (ref 0–0.2)
BASOPHILS NFR BLD AUTO: 0.5 % — SIGNIFICANT CHANGE UP (ref 0–2)
BILIRUB SERPL-MCNC: <0.2 MG/DL — SIGNIFICANT CHANGE UP (ref 0.2–1.2)
BUN SERPL-MCNC: 12 MG/DL — SIGNIFICANT CHANGE UP (ref 7–23)
CALCIUM SERPL-MCNC: 8.5 MG/DL — SIGNIFICANT CHANGE UP (ref 8.4–10.5)
CHLORIDE SERPL-SCNC: 103 MMOL/L — SIGNIFICANT CHANGE UP (ref 96–108)
CO2 SERPL-SCNC: 29 MMOL/L — SIGNIFICANT CHANGE UP (ref 22–31)
CREAT SERPL-MCNC: 0.85 MG/DL — SIGNIFICANT CHANGE UP (ref 0.5–1.3)
CRP SERPL-MCNC: 5.2 MG/L — HIGH (ref 0–4)
EGFR: 74 ML/MIN/1.73M2 — SIGNIFICANT CHANGE UP
EOSINOPHIL # BLD AUTO: 0.24 K/UL — SIGNIFICANT CHANGE UP (ref 0–0.5)
EOSINOPHIL NFR BLD AUTO: 4.3 % — SIGNIFICANT CHANGE UP (ref 0–6)
ERYTHROCYTE [SEDIMENTATION RATE] IN BLOOD: 19 MM/HR — SIGNIFICANT CHANGE UP
GLUCOSE SERPL-MCNC: 95 MG/DL — SIGNIFICANT CHANGE UP (ref 70–99)
HCT VFR BLD CALC: 40.2 % — SIGNIFICANT CHANGE UP (ref 34.5–45)
HCV AB S/CO SERPL IA: 0.03 S/CO — SIGNIFICANT CHANGE UP
HCV AB SERPL-IMP: SIGNIFICANT CHANGE UP
HGB BLD-MCNC: 12.3 G/DL — SIGNIFICANT CHANGE UP (ref 11.5–15.5)
IMM GRANULOCYTES NFR BLD AUTO: 0.4 % — SIGNIFICANT CHANGE UP (ref 0–0.9)
INR BLD: 1.02 — SIGNIFICANT CHANGE UP (ref 0.85–1.18)
LEGIONELLA AG UR QL: NEGATIVE — SIGNIFICANT CHANGE UP
LYMPHOCYTES # BLD AUTO: 1.47 K/UL — SIGNIFICANT CHANGE UP (ref 1–3.3)
LYMPHOCYTES # BLD AUTO: 26.1 % — SIGNIFICANT CHANGE UP (ref 13–44)
MAGNESIUM SERPL-MCNC: 2.3 MG/DL — SIGNIFICANT CHANGE UP (ref 1.6–2.6)
MCHC RBC-ENTMCNC: 27.9 PG — SIGNIFICANT CHANGE UP (ref 27–34)
MCHC RBC-ENTMCNC: 30.6 GM/DL — LOW (ref 32–36)
MCV RBC AUTO: 91.2 FL — SIGNIFICANT CHANGE UP (ref 80–100)
MONOCYTES # BLD AUTO: 0.52 K/UL — SIGNIFICANT CHANGE UP (ref 0–0.9)
MONOCYTES NFR BLD AUTO: 9.2 % — SIGNIFICANT CHANGE UP (ref 2–14)
NEUTROPHILS # BLD AUTO: 3.35 K/UL — SIGNIFICANT CHANGE UP (ref 1.8–7.4)
NEUTROPHILS NFR BLD AUTO: 59.5 % — SIGNIFICANT CHANGE UP (ref 43–77)
NRBC # BLD: 0 /100 WBCS — SIGNIFICANT CHANGE UP (ref 0–0)
PHOSPHATE SERPL-MCNC: 3.6 MG/DL — SIGNIFICANT CHANGE UP (ref 2.5–4.5)
PLATELET # BLD AUTO: 347 K/UL — SIGNIFICANT CHANGE UP (ref 150–400)
POTASSIUM SERPL-MCNC: 3.8 MMOL/L — SIGNIFICANT CHANGE UP (ref 3.5–5.3)
POTASSIUM SERPL-SCNC: 3.8 MMOL/L — SIGNIFICANT CHANGE UP (ref 3.5–5.3)
PROT SERPL-MCNC: 6.8 G/DL — SIGNIFICANT CHANGE UP (ref 6–8.3)
PROTHROM AB SERPL-ACNC: 11.6 SEC — SIGNIFICANT CHANGE UP (ref 9.5–13)
RBC # BLD: 4.41 M/UL — SIGNIFICANT CHANGE UP (ref 3.8–5.2)
RBC # FLD: 21.5 % — HIGH (ref 10.3–14.5)
S PNEUM AG UR QL: NEGATIVE — SIGNIFICANT CHANGE UP
SODIUM SERPL-SCNC: 140 MMOL/L — SIGNIFICANT CHANGE UP (ref 135–145)
WBC # BLD: 5.63 K/UL — SIGNIFICANT CHANGE UP (ref 3.8–10.5)
WBC # FLD AUTO: 5.63 K/UL — SIGNIFICANT CHANGE UP (ref 3.8–10.5)

## 2024-03-08 PROCEDURE — 99222 1ST HOSP IP/OBS MODERATE 55: CPT

## 2024-03-08 RX ORDER — POTASSIUM CHLORIDE 20 MEQ
20 PACKET (EA) ORAL ONCE
Refills: 0 | Status: COMPLETED | OUTPATIENT
Start: 2024-03-08 | End: 2024-03-08

## 2024-03-08 RX ORDER — ROPINIROLE 8 MG/1
1 TABLET, FILM COATED, EXTENDED RELEASE ORAL ONCE
Refills: 0 | Status: COMPLETED | OUTPATIENT
Start: 2024-03-08 | End: 2024-03-08

## 2024-03-08 RX ORDER — ROPINIROLE 8 MG/1
1 TABLET, FILM COATED, EXTENDED RELEASE ORAL AT BEDTIME
Refills: 0 | Status: DISCONTINUED | OUTPATIENT
Start: 2024-03-08 | End: 2024-03-11

## 2024-03-08 RX ADMIN — Medication 200 MILLIGRAM(S): at 06:19

## 2024-03-08 RX ADMIN — ROPINIROLE 1 MILLIGRAM(S): 8 TABLET, FILM COATED, EXTENDED RELEASE ORAL at 03:10

## 2024-03-08 RX ADMIN — Medication 650 MILLIGRAM(S): at 01:53

## 2024-03-08 RX ADMIN — PANTOPRAZOLE SODIUM 40 MILLIGRAM(S): 20 TABLET, DELAYED RELEASE ORAL at 06:19

## 2024-03-08 RX ADMIN — ENOXAPARIN SODIUM 40 MILLIGRAM(S): 100 INJECTION SUBCUTANEOUS at 21:53

## 2024-03-08 RX ADMIN — Medication 30 MILLILITER(S): at 22:42

## 2024-03-08 RX ADMIN — LOSARTAN POTASSIUM 100 MILLIGRAM(S): 100 TABLET, FILM COATED ORAL at 06:19

## 2024-03-08 RX ADMIN — PIPERACILLIN AND TAZOBACTAM 25 GRAM(S): 4; .5 INJECTION, POWDER, LYOPHILIZED, FOR SOLUTION INTRAVENOUS at 00:19

## 2024-03-08 RX ADMIN — GABAPENTIN 900 MILLIGRAM(S): 400 CAPSULE ORAL at 21:53

## 2024-03-08 RX ADMIN — Medication 650 MILLIGRAM(S): at 07:50

## 2024-03-08 RX ADMIN — Medication 20 MILLIEQUIVALENT(S): at 10:54

## 2024-03-08 RX ADMIN — Medication 650 MILLIGRAM(S): at 22:52

## 2024-03-08 RX ADMIN — PIPERACILLIN AND TAZOBACTAM 25 GRAM(S): 4; .5 INJECTION, POWDER, LYOPHILIZED, FOR SOLUTION INTRAVENOUS at 06:19

## 2024-03-08 RX ADMIN — Medication 650 MILLIGRAM(S): at 07:19

## 2024-03-08 RX ADMIN — GABAPENTIN 600 MILLIGRAM(S): 400 CAPSULE ORAL at 00:00

## 2024-03-08 RX ADMIN — Medication 650 MILLIGRAM(S): at 21:52

## 2024-03-08 RX ADMIN — Medication 650 MILLIGRAM(S): at 02:45

## 2024-03-08 RX ADMIN — Medication 200 MILLIGRAM(S): at 17:18

## 2024-03-08 RX ADMIN — PIPERACILLIN AND TAZOBACTAM 25 GRAM(S): 4; .5 INJECTION, POWDER, LYOPHILIZED, FOR SOLUTION INTRAVENOUS at 21:53

## 2024-03-08 RX ADMIN — Medication 100 MILLIGRAM(S): at 10:54

## 2024-03-08 RX ADMIN — PIPERACILLIN AND TAZOBACTAM 25 GRAM(S): 4; .5 INJECTION, POWDER, LYOPHILIZED, FOR SOLUTION INTRAVENOUS at 14:09

## 2024-03-08 NOTE — DISCHARGE NOTE PROVIDER - NSDCFUADDAPPT_GEN_ALL_CORE_FT
(1) Please follow up with your Primary Care Provider, Dr. Kale Marin at [address] [place] [address] on 3/18/2024 at 12:30pm.    Appointment was scheduled by Ms. ANDRESSA Womack, Referral Coordinator.

## 2024-03-08 NOTE — PROGRESS NOTE ADULT - SUBJECTIVE AND OBJECTIVE BOX
Afeb overnight No SOB at rest but fells ant chest discomfort now not only related to deep breath  VS stable Afe In RR Better BS than yesterday Abd is soft

## 2024-03-08 NOTE — PROGRESS NOTE ADULT - PROBLEM SELECTOR PLAN 5
home meds: hydrocodone 10 mg. ropinirole 1 mg , gabapentin 300 mg qhs     -continue home meds. holding hydrocodone Home meds: hydrocodone 10mg. Ropinirole 1mg, gabapentin 300 mg qhs   - continue home meds. holding hydrocodone

## 2024-03-08 NOTE — PROGRESS NOTE ADULT - PROBLEM SELECTOR PLAN 1
~3 week sx of SOB, dry cough malaise and now with low grade fevers, PNA previously diagnosed 2/2024 and refractory to 2 courses outpatient (z-pack, cefdinir), not worse but w/o improvement. no wbc or bandemia, negative procal, negative RVP. Pulm following, suspicion for organizing PNA or malignancy given longstanding smoking hx however will need to evaluate if improved on broad spectrum first     -F/u Bcx   -IV Zosyn   -ESR/CRP  -Urine strep/legionella   -Pulm recs appreciated ~3 week sx of SOB, dry cough malaise and now with low grade fevers, PNA previously diagnosed 2/2024 and refractory to 2 courses outpatient (z-pack, cefdinir), not worse but w/o improvement. no wbc or bandemia, negative procal, negative RVP. Pulm following, suspicion for organizing PNA or malignancy given longstanding smoking hx however will need to evaluate if improved on broad spectrum first. Urine Legionella negative  - F/u Bcx   - continue with IV Zosyn 3.375g q8h  - f/u pulm rec  - plan for bronch on 3/11/24

## 2024-03-08 NOTE — PROGRESS NOTE ADULT - PROBLEM SELECTOR PLAN 6
home meds: Plaquenil 200 mg QD. recently diagnosed and started in 10/2023     -Continue home meds Home meds: Plaquenil 200 mg QD. Recently diagnosed and started in 10/2023   - continue home meds

## 2024-03-08 NOTE — PROGRESS NOTE ADULT - PROBLEM SELECTOR PLAN 7
Plan:  F: s/p 1.6 L NS in the ED   E: replete K<4, Mg<2  N: regular  VTE Prophylaxis: None  GI: pantoprazole   C: Full Code  D: RMF F: s/p 1.6 L NS in the ED   E: replete K<4, Mg<2  N: regular  VTE Prophylaxis: None  GI: pantoprazole   C: Full Code  D: F

## 2024-03-08 NOTE — DISCHARGE NOTE PROVIDER - NSDCCPCAREPLAN_GEN_ALL_CORE_FT
PRINCIPAL DISCHARGE DIAGNOSIS  Diagnosis: Multifocal pneumonia  Assessment and Plan of Treatment: You have pneumonia, which is an infection in your lungs. In the hospital, your health care providers gave you IV antibiotics to help your body get rid of the germs that cause pneumonia. They also made sure you got enough liquids and nutrients. Bronchoscopy was completed to further evaluate the underlying etiology. Detailed results to be reviewed with your outpatient health care provider once available.     PRINCIPAL DISCHARGE DIAGNOSIS  Diagnosis: Pneumonia  Assessment and Plan of Treatment: We think you have either community acquired pneumonia (CAP) or organizing pneumonia. For CAP, antibiotics are usually effective to treat the bacteria causing the pneumonia. And you completed the course if IV antibotics during this hospital stay. Organizing penumonia, on the other hand, is a repair process of the lung in response to preceding alveolar injury. It is not an infection, but rather refers to organized swirls of inflammatory tissue filling the small bronchioles and alveoli. When treated with corticosteroids, most people recover quickly. To have a definitively diagnosis, we will consulted pulmonologist, who completed the bronchoscopy with biopsy taken and sent for analysis. Please take the prednisone 60mg daily for 6 more days starting tomorrow, then 40mg daily until you see Dr. Marin and Dr. Pineda, who will provide instruction for you to taper the prednisone and review the final pathological result from the bronchoscopy.

## 2024-03-08 NOTE — PROGRESS NOTE ADULT - PROBLEM SELECTOR PLAN 2
Home meds: losartan 100 mg, hctz 25 qd     -Continue home meds Home meds: losartan 100 mg, HCTZ 25 qd   - Continue home meds

## 2024-03-08 NOTE — DISCHARGE NOTE PROVIDER - PROVIDER TOKENS
PROVIDER:[TOKEN:[[id number]],FOLLOWUP:[2 weeks]] PROVIDER:[TOKEN:[[id number]],SCHEDULEDAPPT:[03/25/2024],SCHEDULEDAPPTTIME:[01:00 PM]],PROVIDER:[TOKEN:[[id number]],SCHEDULEDAPPT:[03/26/2024],SCHEDULEDAPPTTIME:[09:00 AM]] PROVIDER:[TOKEN:[[id number]],SCHEDULEDAPPT:[03/18/2024],SCHEDULEDAPPTTIME:[12:30 PM]],PROVIDER:[TOKEN:[[id number]],SCHEDULEDAPPT:[03/26/2024],SCHEDULEDAPPTTIME:[09:00 AM]]

## 2024-03-08 NOTE — PROGRESS NOTE ADULT - SUBJECTIVE AND OBJECTIVE BOX
**INCOMPLETE NOTE    OVERNIGHT EVENTS:    SUBJECTIVE:  Patient seen and examined at bedside.  No fever, chills, dizziness, headache, changes in vision, chest pain, dyspnea, nausea or vomiting, dysuria, changes in bowel movements, LE edema. Rest of 12 point Review of systems negative unless otherwise documented elsewhere in note.     Vital Signs Last 12 Hrs  T(F): 97.7 (03-08-24 @ 05:51), Max: 98 (03-07-24 @ 21:34)  HR: 75 (03-08-24 @ 05:51) (75 - 106)  BP: 109/74 (03-08-24 @ 05:51) (109/74 - 141/88)  BP(mean): --  RR: 18 (03-08-24 @ 05:51) (18 - 18)  SpO2: 95% (03-08-24 @ 05:51) (92% - 98%)  I&O's Summary    07 Mar 2024 07:01  -  08 Mar 2024 07:00  --------------------------------------------------------  IN: 1950 mL / OUT: 0 mL / NET: 1950 mL        PHYSICAL EXAM:  Constitutional: NAD, comfortable in bed.  HEENT: NC/AT, PERRLA, EOMI, no conjunctival pallor or scleral icterus, MMM  Neck: Supple, no JVD  Respiratory: CTA B/L. No w/r/r.   Cardiovascular: RRR, normal S1 and S2, no m/r/g.   Gastrointestinal: +BS, soft NTND, no guarding or rebound tenderness, no palpable masses   Extremities: wwp; no cyanosis, clubbing or edema.   Vascular: Pulses equal and strong throughout.   Neurological: AAOx3, no CN deficits, strength and sensation intact throughout.   Skin: No gross skin abnormalities or rashes        LABS:                        14.2   9.87  )-----------( 466      ( 07 Mar 2024 14:56 )             44.8     03-07    141  |  102  |  15  ----------------------------<  126<H>  4.0   |  24  |  0.83    Ca    9.7      07 Mar 2024 14:56        RADIOLOGY & ADDITIONAL TESTS:    MEDICATIONS  (STANDING):  amitriptyline 100 milliGRAM(s) Oral daily  enoxaparin Injectable 40 milliGRAM(s) SubCutaneous every 24 hours  gabapentin 900 milliGRAM(s) Oral at bedtime  hydrochlorothiazide 25 milliGRAM(s) Oral daily  hydroxychloroquine 200 milliGRAM(s) Oral every 12 hours  levothyroxine 50 MICROGram(s) Oral daily  losartan 100 milliGRAM(s) Oral every 24 hours  pantoprazole    Tablet 40 milliGRAM(s) Oral before breakfast  piperacillin/tazobactam IVPB.. 3.375 Gram(s) IV Intermittent every 8 hours  rOPINIRole 1 milliGRAM(s) Oral at bedtime    MEDICATIONS  (PRN):  acetaminophen     Tablet .. 650 milliGRAM(s) Oral every 6 hours PRN Temp greater or equal to 38C (100.4F), Mild Pain (1 - 3)  ondansetron Injectable 4 milliGRAM(s) IV Push every 8 hours PRN Nausea and/or Vomiting   OVERNIGHT EVENTS: admitted and started on Zosyn    SUBJECTIVE: Patient seen and examined at bedside. Patient feels well without coughing but reports fever and worsening lung imaging after failing 2 courses of azithromycin. Patient is concerned about Hb level, because she had acute anemia which was thought to be due to unknown source of internal bleeding. She had previous scopes and capsule but negative for GI bleed. Reports difficulty emptying bladder in the past few days. No fever, chills, dizziness, headache, changes in vision, chest pain, dyspnea, nausea or vomiting, dysuria, changes in bowel movements, LE edema.    Vital Signs Last 12 Hrs  T(F): 97.7 (03-08-24 @ 05:51), Max: 98 (03-07-24 @ 21:34)  HR: 75 (03-08-24 @ 05:51) (75 - 106)  BP: 109/74 (03-08-24 @ 05:51) (109/74 - 141/88)  BP(mean): --  RR: 18 (03-08-24 @ 05:51) (18 - 18)  SpO2: 95% (03-08-24 @ 05:51) (92% - 98%)    I&O's Summary    07 Mar 2024 07:01  -  08 Mar 2024 07:00  --------------------------------------------------------  IN: 1950 mL / OUT: 0 mL / NET: 1950 mL        PHYSICAL EXAM:  Constitutional: NAD, comfortable in bed.  HEENT: NC/AT, PERRLA, EOMI, no conjunctival pallor or scleral icterus, MMM  Neck: Supple, no JVD  Respiratory: CTA B/L. No w/r/r.   Cardiovascular: RRR, normal S1 and S2, no m/r/g.   Gastrointestinal: +BS, soft NTND, no guarding or rebound tenderness, no palpable masses   Extremities: wwp; no cyanosis or clubbing. 1+ pitting edema or RLE (chronic)   Vascular: Pulses equal and strong throughout.   Neurological: AAOx3, no CN deficits, strength and sensation intact throughout.   Skin: No gross skin abnormalities or rashes      LABS:                        14.2   9.87  )-----------( 466      ( 07 Mar 2024 14:56 )             44.8     03-07    141  |  102  |  15  ----------------------------<  126<H>  4.0   |  24  |  0.83    Ca    9.7      07 Mar 2024 14:56      RADIOLOGY & ADDITIONAL TESTS:  < from: CT Angio Chest PE Protocol w/ IV Cont (03.07.24 @ 19:05) >  No pulmonary embolism through the level of the lobar and segmental pulmonary arteries. Evaluation of more distal subsegmental pulmonary   arteries is limited by suboptimal contrast bolus timing.   Multifocal pneumonia. Emphysema.   Enlarged multinodular left thyroid lobe.   Large hiatal hernia.    < from: Xray Chest 1 View- PORTABLE-Urgent (Xray Chest 1 View- PORTABLE-Urgent .) (03.07.24 @ 16:13) >  Left peripheral lower lung consolidation/pleural effusion decreased.   Left parenchymal nodules   Hiatal hernia.   Heart size within normal limits. Slight elevation of the left hemidiaphragm.   Cervicothoracic hardware      MEDICATIONS  (STANDING):  amitriptyline 100 milliGRAM(s) Oral daily  enoxaparin Injectable 40 milliGRAM(s) SubCutaneous every 24 hours  gabapentin 900 milliGRAM(s) Oral at bedtime  hydrochlorothiazide 25 milliGRAM(s) Oral daily  hydroxychloroquine 200 milliGRAM(s) Oral every 12 hours  levothyroxine 50 MICROGram(s) Oral daily  losartan 100 milliGRAM(s) Oral every 24 hours  pantoprazole    Tablet 40 milliGRAM(s) Oral before breakfast  piperacillin/tazobactam IVPB.. 3.375 Gram(s) IV Intermittent every 8 hours  rOPINIRole 1 milliGRAM(s) Oral at bedtime    MEDICATIONS  (PRN):  acetaminophen     Tablet .. 650 milliGRAM(s) Oral every 6 hours PRN Temp greater or equal to 38C (100.4F), Mild Pain (1 - 3)  ondansetron Injectable 4 milliGRAM(s) IV Push every 8 hours PRN Nausea and/or Vomiting

## 2024-03-08 NOTE — DISCHARGE NOTE PROVIDER - CARE PROVIDERS DIRECT ADDRESSES
,DirectAddress_Unknown ,DirectAddress_Unknown,[email or web address]

## 2024-03-08 NOTE — DISCHARGE NOTE PROVIDER - CARE PROVIDER_API CALL
Kale Marin  Internal Medicine  [address] [place] [address]  Phone: [phone]  Fax: [phone]  Follow Up Time: 2 weeks   Kale Marin  Internal Medicine  [address] [address]  Phone: [phone]  Fax: [phone]  Scheduled Appointment: 03/25/2024 01:00 PM    Jose Pineda  Pulmonary Disease  [address]  [place] [address]  Phone: [phone]  Fax: [phone]  Scheduled Appointment: 03/26/2024 09:00 AM   Kale Marin  Internal Medicine  [address] [address]  Phone: [phone]  Fax: [phone]  Scheduled Appointment: 03/18/2024 12:30 PM    Jose Pineda  Pulmonary Disease  [address]  [place] [address]  Phone: [phone]  Fax: [phone]  Scheduled Appointment: 03/26/2024 09:00 AM

## 2024-03-08 NOTE — DISCHARGE NOTE PROVIDER - HOSPITAL COURSE
#Discharge: do not delete    Patient is __ yo M/F with past medical history of _____  Presented with _____, found to have _____    Hospital course (by problem):     New medications:   Medications that were changed:   Medications that were stopped:    Items to follow up as outpatient:     Physical exam at discharge:   #Discharge: do not delete    69F PMH RA (on plaquenil since 11/ 2023), restless leg syndrome on chronic hydrocodone, HTN, goiter, UGIB in nov 2023, hypothyroidism, presents with fever, malaise, nonproductive cough and SOB, admitted for multifocal PNA refractory to multiple abx regimens outpatient     Hospital course (by problem):   #Multifocal pneumonia.   ~3 week sx of SOB, dry cough malaise and now with low grade fevers, PNA previously diagnosed 2/2024 and refractory to 2 courses outpatient (z-pack, cefdinir), not worse but w/o improvement. no wbc or bandemia, negative procal, negative RVP. Pulm following, suspicion for organizing PNA or malignancy given longstanding smoking hx however will need to evaluate if improved on broad spectrum first. Urine Legionella negative  - F/u Bcx - NGTD   - continue with IV Zosyn 3.375g q8h  - f/u pulm rec  - bronc w/ BAL and TBBx on 3/11/24  - will plan for discharge after gross path is back for abx guidance.    #Diarrhea.  Patient had 5 episodes of bowel movements on 3/9, also complains about a severe headache in the morning of 3/10. Home hydrocodone 10mg TID PRN restless leg syndrome on hold since admission. Etiology abx reaction vs withdrawal from opioids, less likely infectious. On 3/11 patient reports 6 episodes of small bowel movements, brown and formed.   - continue to monitor  - obtain GI PCR  - start lactobacillus 2 tabs TID.    #HTN (hypertension).  Home meds: losartan 100 mg, HCTZ 25 qd   - Continue home meds.    #Hypothyroid.  home med: synthroid 50 mcg   - Continue home meds.    #GERD (gastroesophageal reflux disease).  home med: dexlansoprazole 60 mg QD   - Continue w therapeutic interchange PPI.    #Restless legs syndrome (RLS).  Home meds: Hydrocodone/ acetaminophen 10mg-325mg TID PRN, Ropinirole 2mg qHS, Gabapentin 300mg 4 tabs qHS.   - continue home meds.    #Rheumatoid arthritis.  Home meds: Plaquenil 200 mg QD. Recently diagnosed and started in 10/2023   - hold Plaquenil in the setting of diarrhea.    New medications: Bactrim DS qD, Prednisone 60mg qD x 1wk then 40mg qD till follow up with PCP  Medications that were changed: none  Medications that were stopped: Plaquenil    Items to follow up as outpatient:   - follow up with Dr. Marin on 3/25/25 at 1pm  - follow up with Dr. Pineda  - bronchoscopy pathology result

## 2024-03-08 NOTE — DISCHARGE NOTE PROVIDER - NSDCMRMEDTOKEN_GEN_ALL_CORE_FT
amitriptyline 100 mg oral tablet: 1 tab(s) orally once a day  dexlansoprazole 30 mg oral delayed release capsule: 1 cap(s) orally once a day  gabapentin 300 mg oral tablet: orally once a day (at bedtime)  hydroCHLOROthiazide 25 mg oral tablet: 1 tab(s) orally once a day  HYDROcodone 10 mg oral capsule, extended release: 1 cap(s) orally once a day (at bedtime)  hydroxychloroquine 200 mg oral tablet: 1 tab(s) orally 2 times a day  losartan 100 mg oral tablet: 1 tab(s) orally once a day  rOPINIRole 1 mg oral tablet: 1 tab(s) orally once a day (at bedtime)  Synthroid 50 mcg (0.05 mg) oral tablet: 1 tab(s) orally once a day   amitriptyline 100 mg oral tablet: 1 tab(s) orally once a day  dexlansoprazole 60 mg oral delayed release capsule: 1 cap(s) orally once a day  gabapentin 300 mg oral capsule: 4 cap(s) orally once a day  hydroCHLOROthiazide 25 mg oral tablet: 1 tab(s) orally once a day  hydrocodone-acetaminophen 10 mg-325 mg oral tablet: 1 tab(s) orally 3 times a day as needed for Rest leg syndrome  hydroxychloroquine 200 mg oral tablet: 1 tab(s) orally 2 times a day  losartan 100 mg oral tablet: 1 tab(s) orally once a day  rOPINIRole 2 mg oral tablet: 1 tab(s) orally once a day (at bedtime)  Synthroid 50 mcg (0.05 mg) oral tablet: 1 tab(s) orally once a day   amitriptyline 100 mg oral tablet: 1 tab(s) orally once a day  dexlansoprazole 60 mg oral delayed release capsule: 1 cap(s) orally once a day  gabapentin 300 mg oral capsule: 4 cap(s) orally once a day  home pt: As prescribed  hydroCHLOROthiazide 25 mg oral tablet: 1 tab(s) orally once a day  hydrocodone-acetaminophen 10 mg-325 mg oral tablet: 1 tab(s) orally 3 times a day as needed for Rest leg syndrome  hydroxychloroquine 200 mg oral tablet: 1 tab(s) orally 2 times a day  losartan 100 mg oral tablet: 1 tab(s) orally once a day  predniSONE 2 mg oral delayed release tablet: 2 tab(s) orally once a day Please take 3 tablets a day for 6 days starting tomorrow, then take 2 tablets a day until you see Dr. Marin  rOPINIRole 2 mg oral tablet: 1 tab(s) orally once a day (at bedtime)  Synthroid 50 mcg (0.05 mg) oral tablet: 1 tab(s) orally once a day   amitriptyline 100 mg oral tablet: 1 tab(s) orally once a day  dexlansoprazole 60 mg oral delayed release capsule: 1 cap(s) orally once a day  gabapentin 300 mg oral capsule: 4 cap(s) orally once a day  home pt: As prescribed  hydroCHLOROthiazide 25 mg oral tablet: 1 tab(s) orally once a day  hydrocodone-acetaminophen 10 mg-325 mg oral tablet: 1 tab(s) orally 3 times a day as needed for Rest leg syndrome  hydroxychloroquine 200 mg oral tablet: 1 tab(s) orally 2 times a day  losartan 100 mg oral tablet: 1 tab(s) orally once a day  predniSONE 2 mg oral delayed release tablet: 2 tab(s) orally once a day Please take 3 tablets a day for 6 days starting tomorrow, then take 2 tablets a day until you see Dr. Marin  rOPINIRole 2 mg oral tablet: 1 tab(s) orally once a day (at bedtime)  sulfamethoxazole-trimethoprim 400 mg-80 mg oral tablet: 1 tab(s) orally once a day  Synthroid 50 mcg (0.05 mg) oral tablet: 1 tab(s) orally once a day   amitriptyline 100 mg oral tablet: 1 tab(s) orally once a day  dexlansoprazole 60 mg oral delayed release capsule: 1 cap(s) orally once a day  gabapentin 300 mg oral capsule: 4 cap(s) orally once a day  home pt: As prescribed  hydroCHLOROthiazide 25 mg oral tablet: 1 tab(s) orally once a day  hydrocodone-acetaminophen 10 mg-325 mg oral tablet: 1 tab(s) orally 3 times a day as needed for Rest leg syndrome  losartan 100 mg oral tablet: 1 tab(s) orally once a day  predniSONE 20 mg oral tablet: 3 tab(s) orally once a day Please take 3 tablets (60mg) for 6 days (last day 3/18). Take 2 tablets (40mg) thereafter (starting 3/19) until you follow up with Dr Pineda (pulmonologist).  rOPINIRole 2 mg oral tablet: 1 tab(s) orally once a day (at bedtime)  sulfamethoxazole-trimethoprim 400 mg-80 mg oral tablet: 1 tab(s) orally once a day  Synthroid 50 mcg (0.05 mg) oral tablet: 1 tab(s) orally once a day

## 2024-03-08 NOTE — PROGRESS NOTE ADULT - PROBLEM SELECTOR PLAN 4
home med: dexlansoprazole 30 mg QD     -Continue w therapeutic interchange ppi home med: dexlansoprazole 30 mg QD   - Continue w therapeutic interchange PPI

## 2024-03-09 LAB
ALBUMIN SERPL ELPH-MCNC: 4 G/DL — SIGNIFICANT CHANGE UP (ref 3.3–5)
ALP SERPL-CCNC: 105 U/L — SIGNIFICANT CHANGE UP (ref 40–120)
ALT FLD-CCNC: 18 U/L — SIGNIFICANT CHANGE UP (ref 10–45)
ANION GAP SERPL CALC-SCNC: 11 MMOL/L — SIGNIFICANT CHANGE UP (ref 5–17)
AST SERPL-CCNC: 15 U/L — SIGNIFICANT CHANGE UP (ref 10–40)
BASOPHILS # BLD AUTO: 0.05 K/UL — SIGNIFICANT CHANGE UP (ref 0–0.2)
BASOPHILS NFR BLD AUTO: 0.9 % — SIGNIFICANT CHANGE UP (ref 0–2)
BILIRUB SERPL-MCNC: <0.2 MG/DL — SIGNIFICANT CHANGE UP (ref 0.2–1.2)
BUN SERPL-MCNC: 16 MG/DL — SIGNIFICANT CHANGE UP (ref 7–23)
C DIFF GDH STL QL: NEGATIVE — SIGNIFICANT CHANGE UP
C DIFF GDH STL QL: SIGNIFICANT CHANGE UP
CALCIUM SERPL-MCNC: 8.6 MG/DL — SIGNIFICANT CHANGE UP (ref 8.4–10.5)
CHLORIDE SERPL-SCNC: 106 MMOL/L — SIGNIFICANT CHANGE UP (ref 96–108)
CO2 SERPL-SCNC: 25 MMOL/L — SIGNIFICANT CHANGE UP (ref 22–31)
CREAT SERPL-MCNC: 0.79 MG/DL — SIGNIFICANT CHANGE UP (ref 0.5–1.3)
EGFR: 80 ML/MIN/1.73M2 — SIGNIFICANT CHANGE UP
EOSINOPHIL # BLD AUTO: 0.26 K/UL — SIGNIFICANT CHANGE UP (ref 0–0.5)
EOSINOPHIL NFR BLD AUTO: 4.9 % — SIGNIFICANT CHANGE UP (ref 0–6)
GLUCOSE SERPL-MCNC: 132 MG/DL — HIGH (ref 70–99)
HCT VFR BLD CALC: 38.7 % — SIGNIFICANT CHANGE UP (ref 34.5–45)
HGB BLD-MCNC: 12.1 G/DL — SIGNIFICANT CHANGE UP (ref 11.5–15.5)
IMM GRANULOCYTES NFR BLD AUTO: 0.4 % — SIGNIFICANT CHANGE UP (ref 0–0.9)
LYMPHOCYTES # BLD AUTO: 1.74 K/UL — SIGNIFICANT CHANGE UP (ref 1–3.3)
LYMPHOCYTES # BLD AUTO: 32.5 % — SIGNIFICANT CHANGE UP (ref 13–44)
MAGNESIUM SERPL-MCNC: 2.2 MG/DL — SIGNIFICANT CHANGE UP (ref 1.6–2.6)
MCHC RBC-ENTMCNC: 27.9 PG — SIGNIFICANT CHANGE UP (ref 27–34)
MCHC RBC-ENTMCNC: 31.3 GM/DL — LOW (ref 32–36)
MCV RBC AUTO: 89.4 FL — SIGNIFICANT CHANGE UP (ref 80–100)
MONOCYTES # BLD AUTO: 0.39 K/UL — SIGNIFICANT CHANGE UP (ref 0–0.9)
MONOCYTES NFR BLD AUTO: 7.3 % — SIGNIFICANT CHANGE UP (ref 2–14)
NEUTROPHILS # BLD AUTO: 2.9 K/UL — SIGNIFICANT CHANGE UP (ref 1.8–7.4)
NEUTROPHILS NFR BLD AUTO: 54 % — SIGNIFICANT CHANGE UP (ref 43–77)
NRBC # BLD: 0 /100 WBCS — SIGNIFICANT CHANGE UP (ref 0–0)
PHOSPHATE SERPL-MCNC: 3.3 MG/DL — SIGNIFICANT CHANGE UP (ref 2.5–4.5)
PLATELET # BLD AUTO: 344 K/UL — SIGNIFICANT CHANGE UP (ref 150–400)
POTASSIUM SERPL-MCNC: 3.9 MMOL/L — SIGNIFICANT CHANGE UP (ref 3.5–5.3)
POTASSIUM SERPL-SCNC: 3.9 MMOL/L — SIGNIFICANT CHANGE UP (ref 3.5–5.3)
PROT SERPL-MCNC: 7 G/DL — SIGNIFICANT CHANGE UP (ref 6–8.3)
RBC # BLD: 4.33 M/UL — SIGNIFICANT CHANGE UP (ref 3.8–5.2)
RBC # FLD: 21.5 % — HIGH (ref 10.3–14.5)
SODIUM SERPL-SCNC: 142 MMOL/L — SIGNIFICANT CHANGE UP (ref 135–145)
WBC # BLD: 5.36 K/UL — SIGNIFICANT CHANGE UP (ref 3.8–10.5)
WBC # FLD AUTO: 5.36 K/UL — SIGNIFICANT CHANGE UP (ref 3.8–10.5)

## 2024-03-09 RX ADMIN — Medication 200 MILLIGRAM(S): at 06:15

## 2024-03-09 RX ADMIN — PIPERACILLIN AND TAZOBACTAM 25 GRAM(S): 4; .5 INJECTION, POWDER, LYOPHILIZED, FOR SOLUTION INTRAVENOUS at 21:27

## 2024-03-09 RX ADMIN — ENOXAPARIN SODIUM 40 MILLIGRAM(S): 100 INJECTION SUBCUTANEOUS at 21:26

## 2024-03-09 RX ADMIN — ROPINIROLE 1 MILLIGRAM(S): 8 TABLET, FILM COATED, EXTENDED RELEASE ORAL at 00:25

## 2024-03-09 RX ADMIN — PIPERACILLIN AND TAZOBACTAM 25 GRAM(S): 4; .5 INJECTION, POWDER, LYOPHILIZED, FOR SOLUTION INTRAVENOUS at 14:01

## 2024-03-09 RX ADMIN — GABAPENTIN 900 MILLIGRAM(S): 400 CAPSULE ORAL at 21:27

## 2024-03-09 RX ADMIN — PIPERACILLIN AND TAZOBACTAM 25 GRAM(S): 4; .5 INJECTION, POWDER, LYOPHILIZED, FOR SOLUTION INTRAVENOUS at 06:15

## 2024-03-09 RX ADMIN — Medication 50 MICROGRAM(S): at 06:14

## 2024-03-09 RX ADMIN — ROPINIROLE 1 MILLIGRAM(S): 8 TABLET, FILM COATED, EXTENDED RELEASE ORAL at 21:27

## 2024-03-09 RX ADMIN — PANTOPRAZOLE SODIUM 40 MILLIGRAM(S): 20 TABLET, DELAYED RELEASE ORAL at 06:15

## 2024-03-09 RX ADMIN — LOSARTAN POTASSIUM 100 MILLIGRAM(S): 100 TABLET, FILM COATED ORAL at 06:15

## 2024-03-09 RX ADMIN — Medication 100 MILLIGRAM(S): at 12:03

## 2024-03-10 ENCOUNTER — TRANSCRIPTION ENCOUNTER (OUTPATIENT)
Age: 69
End: 2024-03-10

## 2024-03-10 LAB
ANION GAP SERPL CALC-SCNC: 12 MMOL/L — SIGNIFICANT CHANGE UP (ref 5–17)
BUN SERPL-MCNC: 18 MG/DL — SIGNIFICANT CHANGE UP (ref 7–23)
CALCIUM SERPL-MCNC: 8.7 MG/DL — SIGNIFICANT CHANGE UP (ref 8.4–10.5)
CHLORIDE SERPL-SCNC: 104 MMOL/L — SIGNIFICANT CHANGE UP (ref 96–108)
CO2 SERPL-SCNC: 27 MMOL/L — SIGNIFICANT CHANGE UP (ref 22–31)
CREAT SERPL-MCNC: 0.69 MG/DL — SIGNIFICANT CHANGE UP (ref 0.5–1.3)
EGFR: 93 ML/MIN/1.73M2 — SIGNIFICANT CHANGE UP
GLUCOSE SERPL-MCNC: 92 MG/DL — SIGNIFICANT CHANGE UP (ref 70–99)
HCT VFR BLD CALC: 41 % — SIGNIFICANT CHANGE UP (ref 34.5–45)
HGB BLD-MCNC: 12.5 G/DL — SIGNIFICANT CHANGE UP (ref 11.5–15.5)
MCHC RBC-ENTMCNC: 28 PG — SIGNIFICANT CHANGE UP (ref 27–34)
MCHC RBC-ENTMCNC: 30.5 GM/DL — LOW (ref 32–36)
MCV RBC AUTO: 91.9 FL — SIGNIFICANT CHANGE UP (ref 80–100)
NRBC # BLD: 0 /100 WBCS — SIGNIFICANT CHANGE UP (ref 0–0)
PLATELET # BLD AUTO: 343 K/UL — SIGNIFICANT CHANGE UP (ref 150–400)
POTASSIUM SERPL-MCNC: 4 MMOL/L — SIGNIFICANT CHANGE UP (ref 3.5–5.3)
POTASSIUM SERPL-SCNC: 4 MMOL/L — SIGNIFICANT CHANGE UP (ref 3.5–5.3)
RBC # BLD: 4.46 M/UL — SIGNIFICANT CHANGE UP (ref 3.8–5.2)
RBC # FLD: 21.2 % — HIGH (ref 10.3–14.5)
SODIUM SERPL-SCNC: 143 MMOL/L — SIGNIFICANT CHANGE UP (ref 135–145)
WBC # BLD: 7.19 K/UL — SIGNIFICANT CHANGE UP (ref 3.8–10.5)
WBC # FLD AUTO: 7.19 K/UL — SIGNIFICANT CHANGE UP (ref 3.8–10.5)

## 2024-03-10 PROCEDURE — 99233 SBSQ HOSP IP/OBS HIGH 50: CPT | Mod: GC

## 2024-03-10 RX ORDER — ENOXAPARIN SODIUM 100 MG/ML
40 INJECTION SUBCUTANEOUS ONCE
Refills: 0 | Status: COMPLETED | OUTPATIENT
Start: 2024-03-10 | End: 2024-03-10

## 2024-03-10 RX ORDER — KETOROLAC TROMETHAMINE 30 MG/ML
15 SYRINGE (ML) INJECTION ONCE
Refills: 0 | Status: DISCONTINUED | OUTPATIENT
Start: 2024-03-10 | End: 2024-03-10

## 2024-03-10 RX ORDER — OXYCODONE HYDROCHLORIDE 5 MG/1
2.5 TABLET ORAL ONCE
Refills: 0 | Status: DISCONTINUED | OUTPATIENT
Start: 2024-03-10 | End: 2024-03-10

## 2024-03-10 RX ADMIN — PIPERACILLIN AND TAZOBACTAM 25 GRAM(S): 4; .5 INJECTION, POWDER, LYOPHILIZED, FOR SOLUTION INTRAVENOUS at 05:59

## 2024-03-10 RX ADMIN — PIPERACILLIN AND TAZOBACTAM 25 GRAM(S): 4; .5 INJECTION, POWDER, LYOPHILIZED, FOR SOLUTION INTRAVENOUS at 21:45

## 2024-03-10 RX ADMIN — GABAPENTIN 900 MILLIGRAM(S): 400 CAPSULE ORAL at 21:46

## 2024-03-10 RX ADMIN — Medication 650 MILLIGRAM(S): at 01:20

## 2024-03-10 RX ADMIN — Medication 15 MILLIGRAM(S): at 05:58

## 2024-03-10 RX ADMIN — ROPINIROLE 1 MILLIGRAM(S): 8 TABLET, FILM COATED, EXTENDED RELEASE ORAL at 21:46

## 2024-03-10 RX ADMIN — Medication 30 MILLILITER(S): at 21:45

## 2024-03-10 RX ADMIN — PIPERACILLIN AND TAZOBACTAM 25 GRAM(S): 4; .5 INJECTION, POWDER, LYOPHILIZED, FOR SOLUTION INTRAVENOUS at 15:23

## 2024-03-10 RX ADMIN — Medication 100 MILLIGRAM(S): at 12:52

## 2024-03-10 RX ADMIN — Medication 50 MICROGRAM(S): at 05:59

## 2024-03-10 RX ADMIN — OXYCODONE HYDROCHLORIDE 2.5 MILLIGRAM(S): 5 TABLET ORAL at 09:08

## 2024-03-10 RX ADMIN — PANTOPRAZOLE SODIUM 40 MILLIGRAM(S): 20 TABLET, DELAYED RELEASE ORAL at 06:02

## 2024-03-10 RX ADMIN — ENOXAPARIN SODIUM 40 MILLIGRAM(S): 100 INJECTION SUBCUTANEOUS at 19:27

## 2024-03-10 RX ADMIN — Medication 650 MILLIGRAM(S): at 01:55

## 2024-03-10 RX ADMIN — LOSARTAN POTASSIUM 100 MILLIGRAM(S): 100 TABLET, FILM COATED ORAL at 06:02

## 2024-03-10 RX ADMIN — Medication 15 MILLIGRAM(S): at 06:30

## 2024-03-10 NOTE — PROGRESS NOTE ADULT - SUBJECTIVE AND OBJECTIVE BOX
**INCOMPLETE NOTE    OVERNIGHT EVENTS:    SUBJECTIVE:  Patient seen and examined at bedside.  No fever, chills, dizziness, headache, changes in vision, chest pain, dyspnea, nausea or vomiting, dysuria, changes in bowel movements, LE edema. Rest of 12 point Review of systems negative unless otherwise documented elsewhere in note.     Vital Signs Last 12 Hrs  T(F): 97.4 (03-10-24 @ 06:06), Max: 98.1 (03-09-24 @ 20:59)  HR: 79 (03-10-24 @ 06:06) (79 - 91)  BP: 122/80 (03-10-24 @ 06:06) (120/81 - 122/80)  BP(mean): --  RR: 18 (03-10-24 @ 06:06) (18 - 18)  SpO2: 94% (03-10-24 @ 06:06) (93% - 94%)  I&O's Summary      PHYSICAL EXAM:  Constitutional: NAD, comfortable in bed.  HEENT: NC/AT, PERRLA, EOMI, no conjunctival pallor or scleral icterus, MMM  Neck: Supple, no JVD  Respiratory: CTA B/L. No w/r/r.   Cardiovascular: RRR, normal S1 and S2, no m/r/g.   Gastrointestinal: +BS, soft NTND, no guarding or rebound tenderness, no palpable masses   Extremities: wwp; no cyanosis, clubbing or edema.   Vascular: Pulses equal and strong throughout.   Neurological: AAOx3, no CN deficits, strength and sensation intact throughout.   Skin: No gross skin abnormalities or rashes        LABS:                        12.5   7.19  )-----------( 343      ( 10 Mar 2024 05:30 )             41.0     03-09    142  |  106  |  16  ----------------------------<  132<H>  3.9   |  25  |  0.79    Ca    8.6      09 Mar 2024 05:30  Phos  3.3     03-09  Mg     2.2     03-09    TPro  7.0  /  Alb  4.0  /  TBili  <0.2  /  DBili  x   /  AST  15  /  ALT  18  /  AlkPhos  105  03-09    PT/INR - ( 08 Mar 2024 12:00 )   PT: 11.6 sec;   INR: 1.02          PTT - ( 08 Mar 2024 12:00 )  PTT:36.5 sec  Urinalysis Basic - ( 09 Mar 2024 05:30 )    Color: x / Appearance: x / SG: x / pH: x  Gluc: 132 mg/dL / Ketone: x  / Bili: x / Urobili: x   Blood: x / Protein: x / Nitrite: x   Leuk Esterase: x / RBC: x / WBC x   Sq Epi: x / Non Sq Epi: x / Bacteria: x          RADIOLOGY & ADDITIONAL TESTS:    MEDICATIONS  (STANDING):  amitriptyline 100 milliGRAM(s) Oral daily  enoxaparin Injectable 40 milliGRAM(s) SubCutaneous every 24 hours  gabapentin 900 milliGRAM(s) Oral at bedtime  hydrochlorothiazide 25 milliGRAM(s) Oral daily  levothyroxine 50 MICROGram(s) Oral daily  losartan 100 milliGRAM(s) Oral every 24 hours  pantoprazole    Tablet 40 milliGRAM(s) Oral before breakfast  piperacillin/tazobactam IVPB.. 3.375 Gram(s) IV Intermittent every 8 hours  rOPINIRole 1 milliGRAM(s) Oral at bedtime    MEDICATIONS  (PRN):  acetaminophen     Tablet .. 650 milliGRAM(s) Oral every 6 hours PRN Temp greater or equal to 38C (100.4F), Mild Pain (1 - 3)  ondansetron Injectable 4 milliGRAM(s) IV Push every 8 hours PRN Nausea and/or Vomiting   OVERNIGHT EVENTS: Toradol 15 IV for H/A    SUBJECTIVE: Patient seen and examined at bedside. Still in severe headache 8/10. Patient takes hydrocodone 10mg qHS at home for restless leg syndrome. Still with frequent bowel movements (5 times yesterday, 1 early this morning). No fever, chills, dizziness, changes in vision, chest pain, dyspnea, nausea or vomiting, dysuria.     Vital Signs Last 12 Hrs  T(F): 97.4 (03-10-24 @ 06:06), Max: 98.1 (03-09-24 @ 20:59)  HR: 79 (03-10-24 @ 06:06) (79 - 91)  BP: 122/80 (03-10-24 @ 06:06) (120/81 - 122/80)  BP(mean): --  RR: 18 (03-10-24 @ 06:06) (18 - 18)  SpO2: 94% (03-10-24 @ 06:06) (93% - 94%)      PHYSICAL EXAM:  Constitutional: NAD, comfortable in bed.  HEENT: NC/AT, PERRLA, EOMI, no conjunctival pallor or scleral icterus, MMM  Neck: Supple, no JVD  Respiratory: CTA B/L. No w/r/r.   Cardiovascular: RRR, normal S1 and S2, no m/r/g.   Gastrointestinal: +BS, soft NTND, no guarding or rebound tenderness, no palpable masses   Extremities: wwp; no cyanosis or clubbing. 1+ pitting edema or RLE (chronic)   Vascular: Pulses equal and strong throughout.   Neurological: AAOx3, no CN deficits, strength and sensation intact throughout.   Skin: No gross skin abnormalities or rashes      LABS:                        12.5   7.19  )-----------( 343      ( 10 Mar 2024 05:30 )             41.0     03-09    142  |  106  |  16  ----------------------------<  132<H>  3.9   |  25  |  0.79    Ca    8.6      09 Mar 2024 05:30  Phos  3.3     03-09  Mg     2.2     03-09    TPro  7.0  /  Alb  4.0  /  TBili  <0.2  /  DBili  x   /  AST  15  /  ALT  18  /  AlkPhos  105  03-09    PT/INR - ( 08 Mar 2024 12:00 )   PT: 11.6 sec;   INR: 1.02          PTT - ( 08 Mar 2024 12:00 )  PTT:36.5 sec  Urinalysis Basic - ( 09 Mar 2024 05:30 )    Color: x / Appearance: x / SG: x / pH: x  Gluc: 132 mg/dL / Ketone: x  / Bili: x / Urobili: x   Blood: x / Protein: x / Nitrite: x   Leuk Esterase: x / RBC: x / WBC x   Sq Epi: x / Non Sq Epi: x / Bacteria: x          RADIOLOGY & ADDITIONAL TESTS:    MEDICATIONS  (STANDING):  amitriptyline 100 milliGRAM(s) Oral daily  enoxaparin Injectable 40 milliGRAM(s) SubCutaneous every 24 hours  gabapentin 900 milliGRAM(s) Oral at bedtime  hydrochlorothiazide 25 milliGRAM(s) Oral daily  levothyroxine 50 MICROGram(s) Oral daily  losartan 100 milliGRAM(s) Oral every 24 hours  pantoprazole    Tablet 40 milliGRAM(s) Oral before breakfast  piperacillin/tazobactam IVPB.. 3.375 Gram(s) IV Intermittent every 8 hours  rOPINIRole 1 milliGRAM(s) Oral at bedtime    MEDICATIONS  (PRN):  acetaminophen     Tablet .. 650 milliGRAM(s) Oral every 6 hours PRN Temp greater or equal to 38C (100.4F), Mild Pain (1 - 3)  ondansetron Injectable 4 milliGRAM(s) IV Push every 8 hours PRN Nausea and/or Vomiting

## 2024-03-10 NOTE — PHARMACY COMMUNICATION NOTE - COMMENTS
Admission medication  -	Amitriptyline 100mg daily  -	Dexlansoprazole 60mg DR daily  -	Gabapentin 300mg 4 cap QHS  -	Hydrochlorothiazide 25mg daily  -	Hydrocodone/ acetaminophen 10mg-325mg TID for pain (she said it’s for restless of leg, she doesn’t need it now)  -	Hydroxychloroquine 200mg BID  -	Losartan 100mg daily  -	Ropinirole 2mg QHS (patient said she was on 2mg 2tab QHS, but the medication made her feel nausea so she cut it down to 2mg QHS)  -	Levothyroxine 50mcg daily  -	Rivaroxaban 20mg 1tab prn (she said she only took it before and after a long flight due to family history of PE)  -	Ozempic 0.5mg SQ QW for weight loss  Spoke to patient to confirm home medication  -	NOT NEED Albuterol 90mcg inhaler 2 puffs prn Q6H for shortness of breath due to pneumonia (patient said she don’t need it now)

## 2024-03-10 NOTE — PROGRESS NOTE ADULT - PROBLEM SELECTOR PLAN 2
Home meds: losartan 100 mg, HCTZ 25 qd   - Continue home meds Patient had 5 episodes of diarrhea 3/9. C. diff negative. Patient complains about a severe headache in the morning of 3/10. Home hydrocodone 10mg TID PRN restless leg syndrome on hold since admission. Etiology abx reaction vs withdrawal from opioids, less likely infectious.  - continue to monitor  - consider GI PCR and further workup if continues or worsens

## 2024-03-10 NOTE — PROGRESS NOTE ADULT - ASSESSMENT
Ms. Schwartz is a 69 F with hx of RA (on plaquenil since NOv 2023), restless leg syndrome on chronic hydrocodone, HTN, goiter, UGIB in nov 2023, large hiatal hernia who comes in for fever, malaise, and nonproductive cough. She reports nonspecific symptoms since mid-February. She started taking PO augmentin and went to urgent care where a CXR was done and showed LLL pneumonia, small effusion on 2/16, s/p augmentin/azithromycin with no improvement. CTA Thorax done 2/27 w/ multifocal patchy infiltrates in the left lung. Received another course of antibiotics with cefdinir and now presents given lack of improvement of fevers, cough, malaise.     Pulmonary consulted given possible pneumonia refractory to 2 courses of antibiotics    Data Review:  negative RVP, COVID, RSV, Flu testing  CRP 5.2  procal 0.1  blood cultures NGTD  urine strep and legionella negative  MRSA naris pending    # Community acquired pneumonia, Lingula vs. Organizing Pneumonia  - patient presents w/ LLL infiltrate on CXR 2/16 and mid lung patchy infiltrates on left on CTA 2/27 refractory to 2 courses of PO antibiotics.  CXR looks improved from prior. CTA thorax no PE but DAMIEN peripheral consolidation small  - low grade fever here, no leukocytosis, negative procalcitonin, CRP 5.2 (slightly elevated)  - if infectious work up negative with no symptomatic improvement w/ IV antibiotics but with persistent CT scan findings, would start to think of broader differentials such as organizing pneumonia.   - Plan for bronchscospy with  BAL and transbronchial bx of Lingula on MONDAY 03/11/24  - obtain coags, NPO at midnight, hold DVT ppx monday AM     69 F with hx of RA (on plaquenil since 11/2023), restless leg syndrome on chronic hydrocodone, HTN, goiter, UGIB in nov 2023, large hiatal hernia who comes in for fever, malaise, and nonproductive cough. She reports nonspecific symptoms since mid-February. She started taking PO augmentin and went to urgent care where a CXR was done and showed LLL pneumonia, small effusion on 2/16, s/p augmentin/azithromycin with no improvement. CTA Thorax done 2/27 w/ multifocal patchy infiltrates in the left lung. Received another course of antibiotics with cefdinir and now presents given lack of improvement of fevers, cough, malaise.     Pulmonary consulted given possible pneumonia refractory to 2 courses of antibiotics    Data Review:  negative RVP, COVID, RSV, Flu testing  CRP 5.2  procal 0.1  blood cultures NGTD  urine strep and legionella negative    # Community acquired pneumonia, Lingula vs. Organizing Pneumonia  - patient presents w/ LLL infiltrate on CXR 2/16 and mid lung patchy infiltrates on left on CTA 2/27 refractory to 2 courses of PO antibiotics.  CXR looks improved from prior. CTA thorax no PE but DAMIEN peripheral consolidation small  - low grade fever here, no leukocytosis, negative procalcitonin, CRP 5.2 (slightly elevated)  - if infectious work up negative with no symptomatic improvement w/ IV antibiotics but with persistent CT scan findings, would start to think of broader differentials such as organizing pneumonia.   - Plan for bronchscospy with  BAL and transbronchial bx of Lingula on MONDAY 03/11/24  - Obtain coags, NPO at midnight, hold DVT ppx monday AM     69 F with hx of RA (on plaquenil since 11/2023), restless leg syndrome on chronic hydrocodone, HTN, goiter, UGIB in nov 2023, large hiatal hernia who comes in for fever, malaise, and nonproductive cough. She reports nonspecific symptoms since mid-February. She started taking PO augmentin and went to urgent care where a CXR was done and showed LLL pneumonia, small effusion on 2/16, s/p augmentin/azithromycin with no improvement. CTA Thorax done 2/27 w/ multifocal patchy infiltrates in the left lung. Received another course of antibiotics with cefdinir and now presents given lack of improvement of fevers, cough, malaise.     Pulmonary consulted given possible pneumonia refractory to 2 courses of antibiotics    Data Review:  negative RVP, COVID, RSV, Flu testing  CRP 5.2  procal 0.1  blood cultures NGTD  urine strep and legionella negative    # Community acquired pneumonia, Lingula vs. Organizing Pneumonia  - patient presents w/ LLL infiltrate on CXR 2/16 and mid lung patchy infiltrates on left on CTA 2/27 refractory to 2 courses of PO antibiotics.  CXR looks improved from prior. CTA thorax no PE but DAMIEN peripheral consolidation small  - low grade fever here, no leukocytosis, negative procalcitonin, CRP 5.2 (slightly elevated)  - Plan for bronchscospy with  BAL and transbronchial bx of Lingula on MONDAY 03/11/24  - Obtain coags, NPO at midnight, hold DVT ppx monday AM    Patient S/E/D with Dr. Lange

## 2024-03-10 NOTE — PROGRESS NOTE ADULT - PROBLEM SELECTOR PLAN 7
F: s/p 1.6 L NS in the ED   E: replete K<4, Mg<2  N: regular  VTE Prophylaxis: None  GI: pantoprazole   C: Full Code  D: F Home meds: Plaquenil 200 mg QD. Recently diagnosed and started in 10/2023   - continue home meds

## 2024-03-10 NOTE — PROGRESS NOTE ADULT - SUBJECTIVE AND OBJECTIVE BOX
PULMONARY CONSULT SERVICE FOLLOW-UP NOTE    INTERVAL HPI:  Reviewed chart and overnight events; patient seen and examined at bedside.    MEDICATIONS:  Pulmonary:    Antimicrobials:  piperacillin/tazobactam IVPB.. 3.375 Gram(s) IV Intermittent every 8 hours    Anticoagulants:  enoxaparin Injectable 40 milliGRAM(s) SubCutaneous once    Cardiac:  hydrochlorothiazide 25 milliGRAM(s) Oral daily  losartan 100 milliGRAM(s) Oral every 24 hours      Allergies    No Known Allergies    Intolerances        Vital Signs Last 24 Hrs  T(C): 36.3 (10 Mar 2024 06:06), Max: 36.7 (09 Mar 2024 16:27)  T(F): 97.4 (10 Mar 2024 06:06), Max: 98.1 (09 Mar 2024 16:27)  HR: 79 (10 Mar 2024 06:06) (79 - 91)  BP: 106/70 (10 Mar 2024 07:53) (106/70 - 122/80)  BP(mean): --  RR: 18 (10 Mar 2024 06:06) (18 - 18)  SpO2: 94% (10 Mar 2024 06:06) (93% - 94%)    Parameters below as of 10 Mar 2024 06:06  Patient On (Oxygen Delivery Method): room air            PHYSICAL EXAM:  Constitutional: NAD  HEENT: NC/AT; PERRL, anicteric sclera; MMM  Neck: supple  Cardiovascular: +S1/S2, RRR  Respiratory: CTA B/L; no W/R/R  Gastrointestinal: soft, NT/ND  Extremities: WWP; no edema, clubbing or cyanosis  Vascular: 2+ radial pulses B/L  Neurological: awake and alert; CARLOS    LABS:      CBC Full  -  ( 10 Mar 2024 05:30 )  WBC Count : 7.19 K/uL  RBC Count : 4.46 M/uL  Hemoglobin : 12.5 g/dL  Hematocrit : 41.0 %  Platelet Count - Automated : 343 K/uL  Mean Cell Volume : 91.9 fl  Mean Cell Hemoglobin : 28.0 pg  Mean Cell Hemoglobin Concentration : 30.5 gm/dL  Auto Neutrophil # : x  Auto Lymphocyte # : x  Auto Monocyte # : x  Auto Eosinophil # : x  Auto Basophil # : x  Auto Neutrophil % : x  Auto Lymphocyte % : x  Auto Monocyte % : x  Auto Eosinophil % : x  Auto Basophil % : x    03-10    143  |  104  |  18  ----------------------------<  92  4.0   |  27  |  0.69    Ca    8.7      10 Mar 2024 05:30  Phos  3.3     03-09  Mg     2.2     03-09    TPro  7.0  /  Alb  4.0  /  TBili  <0.2  /  DBili  x   /  AST  15  /  ALT  18  /  AlkPhos  105  03-09    PT/INR - ( 08 Mar 2024 12:00 )   PT: 11.6 sec;   INR: 1.02          PTT - ( 08 Mar 2024 12:00 )  PTT:36.5 sec      Urinalysis Basic - ( 10 Mar 2024 05:30 )    Color: x / Appearance: x / SG: x / pH: x  Gluc: 92 mg/dL / Ketone: x  / Bili: x / Urobili: x   Blood: x / Protein: x / Nitrite: x   Leuk Esterase: x / RBC: x / WBC x   Sq Epi: x / Non Sq Epi: x / Bacteria: x                RADIOLOGY & ADDITIONAL STUDIES: PULMONARY CONSULT SERVICE FOLLOW-UP NOTE    INTERVAL HPI:  Reviewed chart and overnight events; patient seen and examined at bedside. Patient notes shortness of breath with walking down [place]. No fevers/chills. Otherwise feels well, generally improving.     MEDICATIONS:  Pulmonary:    Antimicrobials:  piperacillin/tazobactam IVPB.. 3.375 Gram(s) IV Intermittent every 8 hours    Anticoagulants:  enoxaparin Injectable 40 milliGRAM(s) SubCutaneous once    Cardiac:  hydrochlorothiazide 25 milliGRAM(s) Oral daily  losartan 100 milliGRAM(s) Oral every 24 hours      Allergies    No Known Allergies    Intolerances        Vital Signs Last 24 Hrs  T(C): 36.3 (10 Mar 2024 06:06), Max: 36.7 (09 Mar 2024 16:27)  T(F): 97.4 (10 Mar 2024 06:06), Max: 98.1 (09 Mar 2024 16:27)  HR: 79 (10 Mar 2024 06:06) (79 - 91)  BP: 106/70 (10 Mar 2024 07:53) (106/70 - 122/80)  BP(mean): --  RR: 18 (10 Mar 2024 06:06) (18 - 18)  SpO2: 94% (10 Mar 2024 06:06) (93% - 94%)    Parameters below as of 10 Mar 2024 06:06  Patient On (Oxygen Delivery Method): room air            PHYSICAL EXAM:  Constitutional: NAD  HEENT: NC/AT; PERRL, anicteric sclera; MMM  Neck: supple  Cardiovascular: +S1/S2, RRR  Respiratory: Decreased air entry at bases  Gastrointestinal: soft, NT/ND  Extremities: WWP; no edema, clubbing or cyanosis  Vascular: 2+ radial pulses B/L  Neurological: awake and alert; CARLOS    LABS:      CBC Full  -  ( 10 Mar 2024 05:30 )  WBC Count : 7.19 K/uL  RBC Count : 4.46 M/uL  Hemoglobin : 12.5 g/dL  Hematocrit : 41.0 %  Platelet Count - Automated : 343 K/uL  Mean Cell Volume : 91.9 fl  Mean Cell Hemoglobin : 28.0 pg  Mean Cell Hemoglobin Concentration : 30.5 gm/dL  Auto Neutrophil # : x  Auto Lymphocyte # : x  Auto Monocyte # : x  Auto Eosinophil # : x  Auto Basophil # : x  Auto Neutrophil % : x  Auto Lymphocyte % : x  Auto Monocyte % : x  Auto Eosinophil % : x  Auto Basophil % : x    03-10    143  |  104  |  18  ----------------------------<  92  4.0   |  27  |  0.69    Ca    8.7      10 Mar 2024 05:30  Phos  3.3     03-09  Mg     2.2     03-09    TPro  7.0  /  Alb  4.0  /  TBili  <0.2  /  DBili  x   /  AST  15  /  ALT  18  /  AlkPhos  105  03-09    PT/INR - ( 08 Mar 2024 12:00 )   PT: 11.6 sec;   INR: 1.02          PTT - ( 08 Mar 2024 12:00 )  PTT:36.5 sec      Urinalysis Basic - ( 10 Mar 2024 05:30 )    Color: x / Appearance: x / SG: x / pH: x  Gluc: 92 mg/dL / Ketone: x  / Bili: x / Urobili: x   Blood: x / Protein: x / Nitrite: x   Leuk Esterase: x / RBC: x / WBC x   Sq Epi: x / Non Sq Epi: x / Bacteria: x                RADIOLOGY & ADDITIONAL STUDIES:

## 2024-03-10 NOTE — PROGRESS NOTE ADULT - PROBLEM SELECTOR PLAN 5
Home meds: hydrocodone 10mg. Ropinirole 1mg, gabapentin 300 mg qhs   - continue home meds. holding hydrocodone Home meds: Hydrocodone/ acetaminophen 10mg-325mg TID, Ropinirole 2mg qHS, Gabapentin 300mg 4 tabs qHS.   - continue home meds  - holding hydrocodone home med: dexlansoprazole 30 mg QD   - Continue w therapeutic interchange PPI

## 2024-03-10 NOTE — PROGRESS NOTE ADULT - PROBLEM SELECTOR PLAN 1
~3 week sx of SOB, dry cough malaise and now with low grade fevers, PNA previously diagnosed 2/2024 and refractory to 2 courses outpatient (z-pack, cefdinir), not worse but w/o improvement. no wbc or bandemia, negative procal, negative RVP. Pulm following, suspicion for organizing PNA or malignancy given longstanding smoking hx however will need to evaluate if improved on broad spectrum first. Urine Legionella negative  - F/u Bcx   - continue with IV Zosyn 3.375g q8h  - f/u pulm rec  - plan for bronch on 3/11/24

## 2024-03-10 NOTE — PROGRESS NOTE ADULT - ATTENDING COMMENTS
Seen at bedside sitting comfortably in chair on room air but reports significant dyspnea walking or showering. Plan is for bronchoscopy tomorrow as above.

## 2024-03-10 NOTE — PROGRESS NOTE ADULT - PROBLEM SELECTOR PLAN 4
home med: dexlansoprazole 30 mg QD   - Continue w therapeutic interchange PPI home med: synthroid 50 mcg   -Continue home meds

## 2024-03-10 NOTE — PROGRESS NOTE ADULT - PROBLEM SELECTOR PLAN 3
home med: synthroid 50 mcg   -Continue home meds Home meds: losartan 100 mg, HCTZ 25 qd   - Continue home meds

## 2024-03-10 NOTE — PROGRESS NOTE ADULT - PROBLEM SELECTOR PLAN 6
Home meds: Plaquenil 200 mg QD. Recently diagnosed and started in 10/2023   - continue home meds Home meds: Hydrocodone/ acetaminophen 10mg-325mg TID, Ropinirole 2mg qHS, Gabapentin 300mg 4 tabs qHS.   - continue home meds  - holding hydrocodone

## 2024-03-10 NOTE — PROGRESS NOTE ADULT - SUBJECTIVE AND OBJECTIVE BOX
Less SOB/pleuritic pain  Loose stool without abdominal cramps VS stable Afeb In RR Better BS bilat  Abd is soft

## 2024-03-11 ENCOUNTER — RESULT REVIEW (OUTPATIENT)
Age: 69
End: 2024-03-11

## 2024-03-11 DIAGNOSIS — R19.7 DIARRHEA, UNSPECIFIED: ICD-10-CM

## 2024-03-11 LAB
ALBUMIN SERPL ELPH-MCNC: 3.9 G/DL — SIGNIFICANT CHANGE UP (ref 3.3–5)
ALP SERPL-CCNC: 104 U/L — SIGNIFICANT CHANGE UP (ref 40–120)
ALT FLD-CCNC: 21 U/L — SIGNIFICANT CHANGE UP (ref 10–45)
ANION GAP SERPL CALC-SCNC: 11 MMOL/L — SIGNIFICANT CHANGE UP (ref 5–17)
APTT BLD: 36.3 SEC — HIGH (ref 24.5–35.6)
AST SERPL-CCNC: 18 U/L — SIGNIFICANT CHANGE UP (ref 10–40)
BASOPHILS # BLD AUTO: 0.04 K/UL — SIGNIFICANT CHANGE UP (ref 0–0.2)
BASOPHILS NFR BLD AUTO: 0.6 % — SIGNIFICANT CHANGE UP (ref 0–2)
BILIRUB SERPL-MCNC: <0.2 MG/DL — SIGNIFICANT CHANGE UP (ref 0.2–1.2)
BLD GP AB SCN SERPL QL: NEGATIVE — SIGNIFICANT CHANGE UP
BUN SERPL-MCNC: 21 MG/DL — SIGNIFICANT CHANGE UP (ref 7–23)
CALCIUM SERPL-MCNC: 8.9 MG/DL — SIGNIFICANT CHANGE UP (ref 8.4–10.5)
CHLORIDE SERPL-SCNC: 101 MMOL/L — SIGNIFICANT CHANGE UP (ref 96–108)
CO2 SERPL-SCNC: 27 MMOL/L — SIGNIFICANT CHANGE UP (ref 22–31)
CREAT SERPL-MCNC: 0.86 MG/DL — SIGNIFICANT CHANGE UP (ref 0.5–1.3)
EGFR: 73 ML/MIN/1.73M2 — SIGNIFICANT CHANGE UP
EOSINOPHIL # BLD AUTO: 0.39 K/UL — SIGNIFICANT CHANGE UP (ref 0–0.5)
EOSINOPHIL NFR BLD AUTO: 5.5 % — SIGNIFICANT CHANGE UP (ref 0–6)
GI PCR PANEL: SIGNIFICANT CHANGE UP
GLUCOSE SERPL-MCNC: 93 MG/DL — SIGNIFICANT CHANGE UP (ref 70–99)
GRAM STN FLD: SIGNIFICANT CHANGE UP
GRAM STN FLD: SIGNIFICANT CHANGE UP
HCT VFR BLD CALC: 39.2 % — SIGNIFICANT CHANGE UP (ref 34.5–45)
HGB BLD-MCNC: 12 G/DL — SIGNIFICANT CHANGE UP (ref 11.5–15.5)
IMM GRANULOCYTES NFR BLD AUTO: 0.1 % — SIGNIFICANT CHANGE UP (ref 0–0.9)
INR BLD: 1.02 — SIGNIFICANT CHANGE UP (ref 0.85–1.18)
LYMPHOCYTES # BLD AUTO: 2.01 K/UL — SIGNIFICANT CHANGE UP (ref 1–3.3)
LYMPHOCYTES # BLD AUTO: 28.2 % — SIGNIFICANT CHANGE UP (ref 13–44)
MAGNESIUM SERPL-MCNC: 2.3 MG/DL — SIGNIFICANT CHANGE UP (ref 1.6–2.6)
MCHC RBC-ENTMCNC: 28.1 PG — SIGNIFICANT CHANGE UP (ref 27–34)
MCHC RBC-ENTMCNC: 30.6 GM/DL — LOW (ref 32–36)
MCV RBC AUTO: 91.8 FL — SIGNIFICANT CHANGE UP (ref 80–100)
MONOCYTES # BLD AUTO: 0.48 K/UL — SIGNIFICANT CHANGE UP (ref 0–0.9)
MONOCYTES NFR BLD AUTO: 6.7 % — SIGNIFICANT CHANGE UP (ref 2–14)
NEUTROPHILS # BLD AUTO: 4.21 K/UL — SIGNIFICANT CHANGE UP (ref 1.8–7.4)
NEUTROPHILS NFR BLD AUTO: 58.9 % — SIGNIFICANT CHANGE UP (ref 43–77)
NRBC # BLD: 0 /100 WBCS — SIGNIFICANT CHANGE UP (ref 0–0)
NT-PROBNP SERPL-SCNC: 92 PG/ML — SIGNIFICANT CHANGE UP (ref 0–300)
PHOSPHATE SERPL-MCNC: 3.5 MG/DL — SIGNIFICANT CHANGE UP (ref 2.5–4.5)
PLATELET # BLD AUTO: 361 K/UL — SIGNIFICANT CHANGE UP (ref 150–400)
POTASSIUM SERPL-MCNC: 3.8 MMOL/L — SIGNIFICANT CHANGE UP (ref 3.5–5.3)
POTASSIUM SERPL-SCNC: 3.8 MMOL/L — SIGNIFICANT CHANGE UP (ref 3.5–5.3)
PROT SERPL-MCNC: 6.8 G/DL — SIGNIFICANT CHANGE UP (ref 6–8.3)
PROTHROM AB SERPL-ACNC: 11.6 SEC — SIGNIFICANT CHANGE UP (ref 9.5–13)
RBC # BLD: 4.27 M/UL — SIGNIFICANT CHANGE UP (ref 3.8–5.2)
RBC # FLD: 21.4 % — HIGH (ref 10.3–14.5)
RH IG SCN BLD-IMP: POSITIVE — SIGNIFICANT CHANGE UP
SODIUM SERPL-SCNC: 139 MMOL/L — SIGNIFICANT CHANGE UP (ref 135–145)
SPECIMEN SOURCE: SIGNIFICANT CHANGE UP
SPECIMEN SOURCE: SIGNIFICANT CHANGE UP
WBC # BLD: 7.14 K/UL — SIGNIFICANT CHANGE UP (ref 3.8–10.5)
WBC # FLD AUTO: 7.14 K/UL — SIGNIFICANT CHANGE UP (ref 3.8–10.5)

## 2024-03-11 PROCEDURE — 31624 DX BRONCHOSCOPE/LAVAGE: CPT | Mod: GC

## 2024-03-11 PROCEDURE — 71045 X-RAY EXAM CHEST 1 VIEW: CPT | Mod: 26

## 2024-03-11 PROCEDURE — 88305 TISSUE EXAM BY PATHOLOGIST: CPT | Mod: 26

## 2024-03-11 PROCEDURE — 88112 CYTOPATH CELL ENHANCE TECH: CPT | Mod: 26

## 2024-03-11 PROCEDURE — 99233 SBSQ HOSP IP/OBS HIGH 50: CPT | Mod: GC,25

## 2024-03-11 PROCEDURE — 31628 BRONCHOSCOPY/LUNG BX EACH: CPT | Mod: GC,59

## 2024-03-11 RX ORDER — OXYCODONE HYDROCHLORIDE 5 MG/1
10 TABLET ORAL THREE TIMES A DAY
Refills: 0 | Status: DISCONTINUED | OUTPATIENT
Start: 2024-03-11 | End: 2024-03-12

## 2024-03-11 RX ORDER — HYDROCODONE BITARTRATE 50 MG/1
1 CAPSULE, EXTENDED RELEASE ORAL
Refills: 0 | DISCHARGE

## 2024-03-11 RX ORDER — GABAPENTIN 400 MG/1
1200 CAPSULE ORAL AT BEDTIME
Refills: 0 | Status: DISCONTINUED | OUTPATIENT
Start: 2024-03-11 | End: 2024-03-12

## 2024-03-11 RX ORDER — ROPINIROLE 8 MG/1
2 TABLET, FILM COATED, EXTENDED RELEASE ORAL AT BEDTIME
Refills: 0 | Status: DISCONTINUED | OUTPATIENT
Start: 2024-03-11 | End: 2024-03-12

## 2024-03-11 RX ORDER — GABAPENTIN 400 MG/1
0 CAPSULE ORAL
Refills: 0 | DISCHARGE

## 2024-03-11 RX ORDER — ENOXAPARIN SODIUM 100 MG/ML
40 INJECTION SUBCUTANEOUS EVERY 24 HOURS
Refills: 0 | Status: DISCONTINUED | OUTPATIENT
Start: 2024-03-11 | End: 2024-03-12

## 2024-03-11 RX ORDER — ROPINIROLE 8 MG/1
1 TABLET, FILM COATED, EXTENDED RELEASE ORAL
Refills: 0 | DISCHARGE

## 2024-03-11 RX ORDER — OXYCODONE HYDROCHLORIDE 5 MG/1
10 TABLET ORAL THREE TIMES A DAY
Refills: 0 | Status: DISCONTINUED | OUTPATIENT
Start: 2024-03-11 | End: 2024-03-11

## 2024-03-11 RX ORDER — DEXLANSOPRAZOLE 30 MG/1
1 CAPSULE, DELAYED RELEASE ORAL
Refills: 0 | DISCHARGE

## 2024-03-11 RX ORDER — LACTOBACILLUS ACIDOPHILUS 100MM CELL
2 CAPSULE ORAL
Refills: 0 | Status: DISCONTINUED | OUTPATIENT
Start: 2024-03-11 | End: 2024-03-12

## 2024-03-11 RX ADMIN — ROPINIROLE 2 MILLIGRAM(S): 8 TABLET, FILM COATED, EXTENDED RELEASE ORAL at 22:01

## 2024-03-11 RX ADMIN — PIPERACILLIN AND TAZOBACTAM 25 GRAM(S): 4; .5 INJECTION, POWDER, LYOPHILIZED, FOR SOLUTION INTRAVENOUS at 06:14

## 2024-03-11 RX ADMIN — Medication 30 MILLILITER(S): at 22:01

## 2024-03-11 RX ADMIN — Medication 50 MICROGRAM(S): at 06:10

## 2024-03-11 RX ADMIN — GABAPENTIN 1200 MILLIGRAM(S): 400 CAPSULE ORAL at 22:01

## 2024-03-11 RX ADMIN — PANTOPRAZOLE SODIUM 40 MILLIGRAM(S): 20 TABLET, DELAYED RELEASE ORAL at 06:10

## 2024-03-11 RX ADMIN — PIPERACILLIN AND TAZOBACTAM 25 GRAM(S): 4; .5 INJECTION, POWDER, LYOPHILIZED, FOR SOLUTION INTRAVENOUS at 22:00

## 2024-03-11 RX ADMIN — PIPERACILLIN AND TAZOBACTAM 25 GRAM(S): 4; .5 INJECTION, POWDER, LYOPHILIZED, FOR SOLUTION INTRAVENOUS at 17:02

## 2024-03-11 RX ADMIN — ENOXAPARIN SODIUM 40 MILLIGRAM(S): 100 INJECTION SUBCUTANEOUS at 17:08

## 2024-03-11 RX ADMIN — LOSARTAN POTASSIUM 100 MILLIGRAM(S): 100 TABLET, FILM COATED ORAL at 06:09

## 2024-03-11 RX ADMIN — Medication 100 MILLIGRAM(S): at 17:02

## 2024-03-11 RX ADMIN — Medication 2 TABLET(S): at 19:15

## 2024-03-11 NOTE — PROGRESS NOTE ADULT - PROBLEM SELECTOR PLAN 6
Home meds: Plaquenil 200 mg QD. Recently diagnosed and started in 10/2023   - continue home meds Home meds: Plaquenil 200 mg QD. Recently diagnosed and started in 10/2023   - hold Plaquenil in the setting of diarrhea Home meds: Hydrocodone/ acetaminophen 10mg-325mg TID PRN, Ropinirole 2mg qHS, Gabapentin 300mg 4 tabs qHS.   - continue home meds

## 2024-03-11 NOTE — PROGRESS NOTE ADULT - PROBLEM SELECTOR PLAN 4
home med: dexlansoprazole 30 mg QD   - Continue w therapeutic interchange PPI home med: synthroid 50 mcg   - Continue home meds

## 2024-03-11 NOTE — PROGRESS NOTE ADULT - PROBLEM SELECTOR PLAN 2
Home meds: losartan 100 mg, HCTZ 25 qd   - Continue home meds Patient had 5 episodes of bowel movements on 3/9, also complains about a severe headache in the morning of 3/10. Home hydrocodone 10mg TID PRN restless leg syndrome on hold since admission. Etiology abx reaction vs withdrawal from opioids, less likely infectious. On 3/11 patient reports 6 episodes of small bowel movements, brown and formed.   - continue to monitor  - obtain GI PCR  - start lactobacillus 2 tabs TID

## 2024-03-11 NOTE — PROGRESS NOTE ADULT - PROBLEM SELECTOR PLAN 1
~3 week sx of SOB, dry cough malaise and now with low grade fevers, PNA previously diagnosed 2/2024 and refractory to 2 courses outpatient (z-pack, cefdinir), not worse but w/o improvement. no wbc or bandemia, negative procal, negative RVP. Pulm following, suspicion for organizing PNA or malignancy given longstanding smoking hx however will need to evaluate if improved on broad spectrum first. Urine Legionella negative  - F/u Bcx   - continue with IV Zosyn 3.375g q8h  - f/u pulm rec  - plan for bronch on 3/11/24 ~3 week sx of SOB, dry cough malaise and now with low grade fevers, PNA previously diagnosed 2/2024 and refractory to 2 courses outpatient (z-pack, cefdinir), not worse but w/o improvement. no wbc or bandemia, negative procal, negative RVP. Pulm following, suspicion for organizing PNA or malignancy given longstanding smoking hx however will need to evaluate if improved on broad spectrum first. Urine Legionella negative  - F/u Bcx - NGTD   - continue with IV Zosyn 3.375g q8h  - f/u pulm rec  - [facility] w/ BAL and TBBx on 3/11/24  - will plan for discharge after gross path is back for abx guidance

## 2024-03-11 NOTE — PROGRESS NOTE ADULT - SUBJECTIVE AND OBJECTIVE BOX
PULMONARY CONSULT SERVICE FOLLOW-UP NOTE    INTERVAL HPI:  Reviewed chart and overnight events; patient seen and examined. She feels well but still reports dyspnea on exertion. overall minimal improvement w/ IV abx.    MEDICATIONS:  Pulmonary:    Antimicrobials:  piperacillin/tazobactam IVPB.. 3.375 Gram(s) IV Intermittent every 8 hours    Anticoagulants:    Cardiac:  hydrochlorothiazide 25 milliGRAM(s) Oral daily  losartan 100 milliGRAM(s) Oral every 24 hours      Allergies    No Known Allergies    Intolerances        Vital Signs Last 24 Hrs  T(C): 36.4 (11 Mar 2024 05:47), Max: 36.7 (10 Mar 2024 16:28)  T(F): 97.6 (11 Mar 2024 05:47), Max: 98.1 (10 Mar 2024 16:28)  HR: 82 (11 Mar 2024 05:47) (82 - 103)  BP: 125/79 (11 Mar 2024 05:47) (125/79 - 134/89)  BP(mean): --  RR: 18 (11 Mar 2024 05:47) (17 - 18)  SpO2: 93% (11 Mar 2024 05:47) (93% - 94%)    Parameters below as of 11 Mar 2024 05:47  Patient On (Oxygen Delivery Method): room air      PHYSICAL EXAM:  Constitutional: well-appearing, in no apparent respiratory distress  HEENT: NC/AT; PERRL, anicteric sclera; moist mucous membranes  Neck: supple, no JVD or LAD appreciated  Cardiovascular: +S1/S2, Regular rate and rhythm, no murmurs appreciated   Respiratory: Clear breath sounds bilaterally. No wheezes, rhonchi or rales   Gastrointestinal: soft, non-tender, non-distended. Normoactive bowel sounds.   Extremities: no edema, clubbing or cyanosis  Vascular: 2+ radial pulses B/L  Neurological: awake and alert; oriented x3    LABS:      CBC Full  -  ( 11 Mar 2024 05:30 )  WBC Count : 7.14 K/uL  RBC Count : 4.27 M/uL  Hemoglobin : 12.0 g/dL  Hematocrit : 39.2 %  Platelet Count - Automated : 361 K/uL  Mean Cell Volume : 91.8 fl  Mean Cell Hemoglobin : 28.1 pg  Mean Cell Hemoglobin Concentration : 30.6 gm/dL  Auto Neutrophil # : 4.21 K/uL  Auto Lymphocyte # : 2.01 K/uL  Auto Monocyte # : 0.48 K/uL  Auto Eosinophil # : 0.39 K/uL  Auto Basophil # : 0.04 K/uL  Auto Neutrophil % : 58.9 %  Auto Lymphocyte % : 28.2 %  Auto Monocyte % : 6.7 %  Auto Eosinophil % : 5.5 %  Auto Basophil % : 0.6 %    03-11    139  |  101  |  21  ----------------------------<  93  3.8   |  27  |  0.86    Ca    8.9      11 Mar 2024 05:30  Phos  3.5     03-11  Mg     2.3     03-11    TPro  6.8  /  Alb  3.9  /  TBili  <0.2  /  DBili  x   /  AST  18  /  ALT  21  /  AlkPhos  104  03-11    PT/INR - ( 11 Mar 2024 05:30 )   PT: 11.6 sec;   INR: 1.02          PTT - ( 11 Mar 2024 05:30 )  PTT:36.3 sec        RADIOLOGY & ADDITIONAL STUDIES:    no new studies

## 2024-03-11 NOTE — PROGRESS NOTE ADULT - PROBLEM SELECTOR PLAN 3
home med: synthroid 50 mcg   -Continue home meds home med: synthroid 50 mcg   - Continue home meds Home meds: losartan 100 mg, HCTZ 25 qd   - Continue home meds

## 2024-03-11 NOTE — PROGRESS NOTE ADULT - SUBJECTIVE AND OBJECTIVE BOX
**INCOMPLETE NOTE    OVERNIGHT EVENTS:    SUBJECTIVE:  Patient seen and examined at bedside.  No fever, chills, dizziness, headache, changes in vision, chest pain, dyspnea, nausea or vomiting, dysuria, changes in bowel movements, LE edema. Rest of 12 point Review of systems negative unless otherwise documented elsewhere in note.     Vital Signs Last 12 Hrs  T(F): 97.6 (03-11-24 @ 05:47), Max: 97.7 (03-10-24 @ 20:41)  HR: 82 (03-11-24 @ 05:47) (82 - 103)  BP: 125/79 (03-11-24 @ 05:47) (125/79 - 134/89)  BP(mean): --  RR: 18 (03-11-24 @ 05:47) (17 - 18)  SpO2: 93% (03-11-24 @ 05:47) (93% - 93%)  I&O's Summary      PHYSICAL EXAM:  Constitutional: NAD, comfortable in bed.  HEENT: NC/AT, PERRLA, EOMI, no conjunctival pallor or scleral icterus, MMM  Neck: Supple, no JVD  Respiratory: CTA B/L. No w/r/r.   Cardiovascular: RRR, normal S1 and S2, no m/r/g.   Gastrointestinal: +BS, soft NTND, no guarding or rebound tenderness, no palpable masses   Extremities: wwp; no cyanosis or clubbing. 1+ pitting edema or RLE (chronic)   Vascular: Pulses equal and strong throughout.   Neurological: AAOx3, no CN deficits, strength and sensation intact throughout.   Skin: No gross skin abnormalities or rashes          LABS:                        12.0   7.14  )-----------( 361      ( 11 Mar 2024 05:30 )             39.2     03-10    143  |  104  |  18  ----------------------------<  92  4.0   |  27  |  0.69    Ca    8.7      10 Mar 2024 05:30      PT/INR - ( 11 Mar 2024 05:30 )   PT: 11.6 sec;   INR: 1.02     PTT - ( 11 Mar 2024 05:30 )  PTT:36.3 sec      RADIOLOGY & ADDITIONAL TESTS:    MEDICATIONS  (STANDING):  amitriptyline 100 milliGRAM(s) Oral daily  gabapentin 900 milliGRAM(s) Oral at bedtime  hydrochlorothiazide 25 milliGRAM(s) Oral daily  levothyroxine 50 MICROGram(s) Oral daily  losartan 100 milliGRAM(s) Oral every 24 hours  pantoprazole    Tablet 40 milliGRAM(s) Oral before breakfast  piperacillin/tazobactam IVPB.. 3.375 Gram(s) IV Intermittent every 8 hours  rOPINIRole 1 milliGRAM(s) Oral at bedtime    MEDICATIONS  (PRN):  acetaminophen     Tablet .. 650 milliGRAM(s) Oral every 6 hours PRN Temp greater or equal to 38C (100.4F), Mild Pain (1 - 3)  ondansetron Injectable 4 milliGRAM(s) IV Push every 8 hours PRN Nausea and/or Vomiting   OVERNIGHT EVENTS: LAURENT    SUBJECTIVE: Patient seen and examined at bedside. Patient report 6 episodes of diarrhea yesterday. Feels ready for the bronch today. No fever, chills, dizziness, headache, changes in vision, chest pain, dyspnea, nausea or vomiting, dysuria, LE edema.     Vital Signs Last 12 Hrs  T(F): 97.6 (03-11-24 @ 05:47), Max: 97.7 (03-10-24 @ 20:41)  HR: 82 (03-11-24 @ 05:47) (82 - 103)  BP: 125/79 (03-11-24 @ 05:47) (125/79 - 134/89)  BP(mean): --  RR: 18 (03-11-24 @ 05:47) (17 - 18)  SpO2: 93% (03-11-24 @ 05:47) (93% - 93%)      PHYSICAL EXAM:  Constitutional: NAD, comfortable in bed.  HEENT: NC/AT, PERRLA, EOMI, no conjunctival pallor or scleral icterus, MMM  Neck: Supple, no JVD  Respiratory: CTA B/L. No w/r/r.   Cardiovascular: RRR, normal S1 and S2, no m/r/g.   Gastrointestinal: +BS, soft NTND, no guarding or rebound tenderness, no palpable masses   Extremities: wwp; no cyanosis or clubbing. 1+ pitting edema or RLE (chronic)   Vascular: Pulses equal and strong throughout.   Neurological: AAOx3, no CN deficits, strength and sensation intact throughout.   Skin: No gross skin abnormalities or rashes          LABS:                        12.0   7.14  )-----------( 361      ( 11 Mar 2024 05:30 )             39.2     03-10    143  |  104  |  18  ----------------------------<  92  4.0   |  27  |  0.69    Ca    8.7      10 Mar 2024 05:30      PT/INR - ( 11 Mar 2024 05:30 )   PT: 11.6 sec;   INR: 1.02     PTT - ( 11 Mar 2024 05:30 )  PTT:36.3 sec      RADIOLOGY & ADDITIONAL TESTS:    MEDICATIONS  (STANDING):  amitriptyline 100 milliGRAM(s) Oral daily  gabapentin 900 milliGRAM(s) Oral at bedtime  hydrochlorothiazide 25 milliGRAM(s) Oral daily  levothyroxine 50 MICROGram(s) Oral daily  losartan 100 milliGRAM(s) Oral every 24 hours  pantoprazole    Tablet 40 milliGRAM(s) Oral before breakfast  piperacillin/tazobactam IVPB.. 3.375 Gram(s) IV Intermittent every 8 hours  rOPINIRole 1 milliGRAM(s) Oral at bedtime    MEDICATIONS  (PRN):  acetaminophen     Tablet .. 650 milliGRAM(s) Oral every 6 hours PRN Temp greater or equal to 38C (100.4F), Mild Pain (1 - 3)  ondansetron Injectable 4 milliGRAM(s) IV Push every 8 hours PRN Nausea and/or Vomiting

## 2024-03-11 NOTE — PROGRESS NOTE ADULT - PROBLEM SELECTOR PLAN 7
F: s/p 1.6 L NS in the ED   E: replete K<4, Mg<2  N: regular  VTE Prophylaxis: None  GI: pantoprazole   C: Full Code  D: F Home meds: Plaquenil 200 mg QD. Recently diagnosed and started in 10/2023   - hold Plaquenil in the setting of diarrhea

## 2024-03-11 NOTE — PRE-ANESTHESIA EVALUATION ADULT - NSANTHPMHFT_GEN_ALL_CORE
69 F with hx of RA (on Plaquenil since 11/2023), restless leg syndrome on chronic hydrocodone, HTN, goiter, UGIB in nov 2023, large hiatal hernia who comes in for fever, malaise, and nonproductive cough. She reports nonspecific symptoms since mid-February. She started taking PO augmentin and went to urgent care where a CXR was done and showed LLL pneumonia, small effusion on 2/16, s/p augmentin/azithromycin with no improvement. CTA Thorax done 2/27 w/ multifocal patchy infiltrates in the left lung. Received another course of antibiotics with cefdinir but still reports persistent dyspnea on exertion.

## 2024-03-11 NOTE — PROGRESS NOTE ADULT - PROBLEM SELECTOR PLAN 5
Home meds: Hydrocodone/ acetaminophen 10mg-325mg TID, Ropinirole 2mg qHS, Gabapentin 300mg 4 tabs qHS.   - continue home meds  - holding hydrocodone Home meds: Hydrocodone/ acetaminophen 10mg-325mg TID, Ropinirole 2mg qHS, Gabapentin 300mg 4 tabs qHS.   - continue home meds  - holding hydrocodo Home meds: Hydrocodone/ acetaminophen 10mg-325mg TID PRN, Ropinirole 2mg qHS, Gabapentin 300mg 4 tabs qHS.   - continue home meds home med: dexlansoprazole 30 mg QD   - Continue w therapeutic interchange PPI

## 2024-03-11 NOTE — PROGRESS NOTE ADULT - ASSESSMENT
69 F with hx of RA (on plaquenil since 11/2023), restless leg syndrome on chronic hydrocodone, HTN, goiter, UGIB in nov 2023, large hiatal hernia who comes in for fever, malaise, and nonproductive cough. She reports nonspecific symptoms since mid-February. She started taking PO augmentin and went to urgent care where a CXR was done and showed LLL pneumonia, small effusion on 2/16, s/p augmentin/azithromycin with no improvement. CTA Thorax done 2/27 w/ multifocal patchy infiltrates in the left lung. Received another course of antibiotics with cefdinir but still reports persistnet dyspnea on exertion.    Pulmonary consulted given possible pneumonia refractory to 2 courses of antibiotics    Data Review:  negative RVP, COVID, RSV, Flu testing  CRP 5.2  procal 0.1  blood cultures NGTD  urine strep and legionella negative    # Community acquired pneumonia, Lingula vs. Organizing Pneumonia  - patient presents w/ LLL infiltrate on CXR 2/16 and mid lung patchy infiltrates on left on CTA 2/27 refractory to 2 courses of PO antibiotics.  CXR looks improved from prior. CTA thorax no PE but DAMIEN peripheral consolidation small  - low grade fever here, no leukocytosis, negative procalcitonin, CRP 5.2 (slightly elevated)  - Plan for bronchoscopy with  BAL and transbronchial bx of Lingula TODAY    Patient S/E/D with Dr. Chatterjee

## 2024-03-11 NOTE — PRE-ANESTHESIA EVALUATION ADULT - NSPREOPDXFT_GEN_ALL_CORE
Pneumonia O-T Advancement Flap Text: The defect edges were debeveled with a #15 scalpel blade.  Given the location of the defect, shape of the defect and the proximity to free margins an O-T advancement flap was deemed most appropriate.  Using a sterile surgical marker, an appropriate advancement flap was drawn incorporating the defect and placing the expected incisions within the relaxed skin tension lines where possible.    The area thus outlined was incised deep to adipose tissue with a #15 scalpel blade.  The skin margins were undermined to an appropriate distance in all directions utilizing iris scissors.

## 2024-03-11 NOTE — PROGRESS NOTE ADULT - ATTENDING COMMENTS
70 yo F hx of RA on plaquenil, large hiatal hernia, has had persistent fever, cough, dyspnea despite multiple out patient oral abx courses, found to have peripheral based consolidations of lingula, concern for ongoing CAP vs. OP. High likelihood of microaspiration/chronic aspiration given large hiatal hernia which can increase susceptibility to recurrent asp pna and OP. No significant response to IV zosyn. Plan for bronc w/ BAL and TBBx today. Will try to obtain biopsy under radial EBUS guidance, although may be difficult w/out navigational bronchoscopy. If results are nondiagnostic she may ultimately require repeat biopsy - either sandi bronch vs. CT guided percutaneous vs. open lung biopsy. VSS, exam as above, coags stable,  medically cleared for procedure today.  Rest as above.

## 2024-03-12 ENCOUNTER — TRANSCRIPTION ENCOUNTER (OUTPATIENT)
Age: 69
End: 2024-03-12

## 2024-03-12 ENCOUNTER — NON-APPOINTMENT (OUTPATIENT)
Age: 69
End: 2024-03-12

## 2024-03-12 VITALS — WEIGHT: 199.96 LBS | HEIGHT: 63 IN

## 2024-03-12 LAB
ANION GAP SERPL CALC-SCNC: 10 MMOL/L — SIGNIFICANT CHANGE UP (ref 5–17)
B PERT IGG+IGM PNL SER: SIGNIFICANT CHANGE UP
B PERT IGG+IGM PNL SER: SIGNIFICANT CHANGE UP
BUN SERPL-MCNC: 16 MG/DL — SIGNIFICANT CHANGE UP (ref 7–23)
CALCIUM SERPL-MCNC: 9.3 MG/DL — SIGNIFICANT CHANGE UP (ref 8.4–10.5)
CHLORIDE SERPL-SCNC: 103 MMOL/L — SIGNIFICANT CHANGE UP (ref 96–108)
CO2 SERPL-SCNC: 27 MMOL/L — SIGNIFICANT CHANGE UP (ref 22–31)
COLOR FLD: SIGNIFICANT CHANGE UP
COLOR FLD: SIGNIFICANT CHANGE UP
COMMENT - FLUIDS: SIGNIFICANT CHANGE UP
CREAT SERPL-MCNC: 0.66 MG/DL — SIGNIFICANT CHANGE UP (ref 0.5–1.3)
CULTURE RESULTS: SIGNIFICANT CHANGE UP
CULTURE RESULTS: SIGNIFICANT CHANGE UP
EGFR: 94 ML/MIN/1.73M2 — SIGNIFICANT CHANGE UP
EOSINOPHIL # FLD: 3 % — SIGNIFICANT CHANGE UP
EOSINOPHIL # FLD: 5 % — SIGNIFICANT CHANGE UP
FLUID INTAKE SUBSTANCE CLASS: SIGNIFICANT CHANGE UP
FLUID INTAKE SUBSTANCE CLASS: SIGNIFICANT CHANGE UP
GLUCOSE SERPL-MCNC: 113 MG/DL — HIGH (ref 70–99)
HCT VFR BLD CALC: 41.7 % — SIGNIFICANT CHANGE UP (ref 34.5–45)
HGB BLD-MCNC: 13.3 G/DL — SIGNIFICANT CHANGE UP (ref 11.5–15.5)
LYMPHOCYTES # FLD: 2 % — SIGNIFICANT CHANGE UP
LYMPHOCYTES # FLD: 9 % — SIGNIFICANT CHANGE UP
MAGNESIUM SERPL-MCNC: 2.3 MG/DL — SIGNIFICANT CHANGE UP (ref 1.6–2.6)
MCHC RBC-ENTMCNC: 28.2 PG — SIGNIFICANT CHANGE UP (ref 27–34)
MCHC RBC-ENTMCNC: 31.9 GM/DL — LOW (ref 32–36)
MCV RBC AUTO: 88.5 FL — SIGNIFICANT CHANGE UP (ref 80–100)
MONOS+MACROS # FLD: 11 % — SIGNIFICANT CHANGE UP
MONOS+MACROS # FLD: 7 % — SIGNIFICANT CHANGE UP
NEUTROPHILS-BODY FLUID: 77 % — SIGNIFICANT CHANGE UP
NEUTROPHILS-BODY FLUID: 86 % — SIGNIFICANT CHANGE UP
NIGHT BLUE STAIN TISS: SIGNIFICANT CHANGE UP
NIGHT BLUE STAIN TISS: SIGNIFICANT CHANGE UP
NRBC # BLD: 0 /100 WBCS — SIGNIFICANT CHANGE UP (ref 0–0)
PHOSPHATE SERPL-MCNC: 3.3 MG/DL — SIGNIFICANT CHANGE UP (ref 2.5–4.5)
PLATELET # BLD AUTO: 404 K/UL — HIGH (ref 150–400)
POTASSIUM SERPL-MCNC: 4.2 MMOL/L — SIGNIFICANT CHANGE UP (ref 3.5–5.3)
POTASSIUM SERPL-SCNC: 4.2 MMOL/L — SIGNIFICANT CHANGE UP (ref 3.5–5.3)
RBC # BLD: 4.71 M/UL — SIGNIFICANT CHANGE UP (ref 3.8–5.2)
RBC # FLD: 20.7 % — HIGH (ref 10.3–14.5)
RCV VOL RI: 16 /UL — HIGH (ref 0–0)
RCV VOL RI: HIGH /UL (ref 0–0)
RHEUMATOID FACT SERPL-ACNC: <10 IU/ML — SIGNIFICANT CHANGE UP (ref 0–13)
SODIUM SERPL-SCNC: 140 MMOL/L — SIGNIFICANT CHANGE UP (ref 135–145)
SPECIMEN SOURCE FLD: SIGNIFICANT CHANGE UP
SPECIMEN SOURCE FLD: SIGNIFICANT CHANGE UP
SPECIMEN SOURCE: SIGNIFICANT CHANGE UP
SURGICAL PATHOLOGY STUDY: SIGNIFICANT CHANGE UP
TOTAL NUCLEATED CELL COUNT, BODY FLUID: 1356 /UL — SIGNIFICANT CHANGE UP
TOTAL NUCLEATED CELL COUNT, BODY FLUID: 249 /UL — SIGNIFICANT CHANGE UP
TUBE TYPE: SIGNIFICANT CHANGE UP
TUBE TYPE: SIGNIFICANT CHANGE UP
WBC # BLD: 8.04 K/UL — SIGNIFICANT CHANGE UP (ref 3.8–10.5)
WBC # FLD AUTO: 8.04 K/UL — SIGNIFICANT CHANGE UP (ref 3.8–10.5)

## 2024-03-12 PROCEDURE — 86900 BLOOD TYPING SEROLOGIC ABO: CPT

## 2024-03-12 PROCEDURE — 80048 BASIC METABOLIC PNL TOTAL CA: CPT

## 2024-03-12 PROCEDURE — 87324 CLOSTRIDIUM AG IA: CPT

## 2024-03-12 PROCEDURE — 88184 FLOWCYTOMETRY/ TC 1 MARKER: CPT

## 2024-03-12 PROCEDURE — 83735 ASSAY OF MAGNESIUM: CPT

## 2024-03-12 PROCEDURE — 86200 CCP ANTIBODY: CPT

## 2024-03-12 PROCEDURE — 85025 COMPLETE CBC W/AUTO DIFF WBC: CPT

## 2024-03-12 PROCEDURE — 87594 PNEUMCYSTS JIROVECII AMP PRB: CPT

## 2024-03-12 PROCEDURE — 87206 SMEAR FLUORESCENT/ACID STAI: CPT

## 2024-03-12 PROCEDURE — 85730 THROMBOPLASTIN TIME PARTIAL: CPT

## 2024-03-12 PROCEDURE — 86803 HEPATITIS C AB TEST: CPT

## 2024-03-12 PROCEDURE — 99233 SBSQ HOSP IP/OBS HIGH 50: CPT

## 2024-03-12 PROCEDURE — 80053 COMPREHEN METABOLIC PANEL: CPT

## 2024-03-12 PROCEDURE — 86850 RBC ANTIBODY SCREEN: CPT

## 2024-03-12 PROCEDURE — 71045 X-RAY EXAM CHEST 1 VIEW: CPT

## 2024-03-12 PROCEDURE — 0225U NFCT DS DNA&RNA 21 SARSCOV2: CPT

## 2024-03-12 PROCEDURE — 86901 BLOOD TYPING SEROLOGIC RH(D): CPT

## 2024-03-12 PROCEDURE — 85652 RBC SED RATE AUTOMATED: CPT

## 2024-03-12 PROCEDURE — 99285 EMERGENCY DEPT VISIT HI MDM: CPT

## 2024-03-12 PROCEDURE — 88185 FLOWCYTOMETRY/TC ADD-ON: CPT

## 2024-03-12 PROCEDURE — 86431 RHEUMATOID FACTOR QUANT: CPT

## 2024-03-12 PROCEDURE — 36415 COLL VENOUS BLD VENIPUNCTURE: CPT

## 2024-03-12 PROCEDURE — 93005 ELECTROCARDIOGRAM TRACING: CPT

## 2024-03-12 PROCEDURE — 85027 COMPLETE CBC AUTOMATED: CPT

## 2024-03-12 PROCEDURE — 84145 PROCALCITONIN (PCT): CPT

## 2024-03-12 PROCEDURE — 84100 ASSAY OF PHOSPHORUS: CPT

## 2024-03-12 PROCEDURE — 87040 BLOOD CULTURE FOR BACTERIA: CPT

## 2024-03-12 PROCEDURE — 85610 PROTHROMBIN TIME: CPT

## 2024-03-12 PROCEDURE — 86038 ANTINUCLEAR ANTIBODIES: CPT

## 2024-03-12 PROCEDURE — 86140 C-REACTIVE PROTEIN: CPT

## 2024-03-12 PROCEDURE — 87015 SPECIMEN INFECT AGNT CONCNTJ: CPT

## 2024-03-12 PROCEDURE — 96375 TX/PRO/DX INJ NEW DRUG ADDON: CPT

## 2024-03-12 PROCEDURE — 88112 CYTOPATH CELL ENHANCE TECH: CPT

## 2024-03-12 PROCEDURE — 87116 MYCOBACTERIA CULTURE: CPT

## 2024-03-12 PROCEDURE — 83605 ASSAY OF LACTIC ACID: CPT

## 2024-03-12 PROCEDURE — 86356 MONONUCLEAR CELL ANTIGEN: CPT

## 2024-03-12 PROCEDURE — 87305 ASPERGILLUS AG IA: CPT

## 2024-03-12 PROCEDURE — 87102 FUNGUS ISOLATION CULTURE: CPT

## 2024-03-12 PROCEDURE — 83880 ASSAY OF NATRIURETIC PEPTIDE: CPT

## 2024-03-12 PROCEDURE — 87637 SARSCOV2&INF A&B&RSV AMP PRB: CPT

## 2024-03-12 PROCEDURE — 87070 CULTURE OTHR SPECIMN AEROBIC: CPT

## 2024-03-12 PROCEDURE — 88305 TISSUE EXAM BY PATHOLOGIST: CPT

## 2024-03-12 PROCEDURE — 87507 IADNA-DNA/RNA PROBE TQ 12-25: CPT

## 2024-03-12 PROCEDURE — 76000 FLUOROSCOPY <1 HR PHYS/QHP: CPT

## 2024-03-12 PROCEDURE — 96374 THER/PROPH/DIAG INJ IV PUSH: CPT

## 2024-03-12 PROCEDURE — 87899 AGENT NOS ASSAY W/OPTIC: CPT

## 2024-03-12 PROCEDURE — 89051 BODY FLUID CELL COUNT: CPT

## 2024-03-12 PROCEDURE — 87205 SMEAR GRAM STAIN: CPT

## 2024-03-12 PROCEDURE — 71275 CT ANGIOGRAPHY CHEST: CPT | Mod: MC

## 2024-03-12 PROCEDURE — 87449 NOS EACH ORGANISM AG IA: CPT

## 2024-03-12 PROCEDURE — 96376 TX/PRO/DX INJ SAME DRUG ADON: CPT

## 2024-03-12 RX ORDER — HYDROXYCHLOROQUINE SULFATE 200 MG
1 TABLET ORAL
Refills: 0 | DISCHARGE

## 2024-03-12 RX ORDER — ROPINIROLE 8 MG/1
2 TABLET, FILM COATED, EXTENDED RELEASE ORAL DAILY
Refills: 0 | Status: DISCONTINUED | OUTPATIENT
Start: 2024-03-12 | End: 2024-03-12

## 2024-03-12 RX ORDER — PREDNISONE 10 MG/1
0.25 TABLET ORAL
Qty: 90 | Refills: 0 | DISCHARGE
Start: 2024-03-12 | End: 2024-04-10

## 2024-03-12 RX ORDER — GABAPENTIN 400 MG/1
1200 CAPSULE ORAL DAILY
Refills: 0 | Status: DISCONTINUED | OUTPATIENT
Start: 2024-03-12 | End: 2024-03-12

## 2024-03-12 RX ADMIN — LOSARTAN POTASSIUM 100 MILLIGRAM(S): 100 TABLET, FILM COATED ORAL at 06:17

## 2024-03-12 RX ADMIN — PIPERACILLIN AND TAZOBACTAM 25 GRAM(S): 4; .5 INJECTION, POWDER, LYOPHILIZED, FOR SOLUTION INTRAVENOUS at 06:18

## 2024-03-12 RX ADMIN — Medication 50 MICROGRAM(S): at 06:18

## 2024-03-12 RX ADMIN — Medication 60 MILLIGRAM(S): at 16:55

## 2024-03-12 RX ADMIN — Medication 650 MILLIGRAM(S): at 07:26

## 2024-03-12 RX ADMIN — Medication 30 MILLILITER(S): at 09:43

## 2024-03-12 RX ADMIN — PIPERACILLIN AND TAZOBACTAM 25 GRAM(S): 4; .5 INJECTION, POWDER, LYOPHILIZED, FOR SOLUTION INTRAVENOUS at 14:11

## 2024-03-12 RX ADMIN — Medication 2 TABLET(S): at 09:43

## 2024-03-12 RX ADMIN — Medication 650 MILLIGRAM(S): at 06:17

## 2024-03-12 RX ADMIN — Medication 100 MILLIGRAM(S): at 14:11

## 2024-03-12 RX ADMIN — Medication 1 TABLET(S): at 16:56

## 2024-03-12 RX ADMIN — GABAPENTIN 1200 MILLIGRAM(S): 400 CAPSULE ORAL at 16:56

## 2024-03-12 RX ADMIN — PANTOPRAZOLE SODIUM 40 MILLIGRAM(S): 20 TABLET, DELAYED RELEASE ORAL at 06:17

## 2024-03-12 RX ADMIN — Medication 2 TABLET(S): at 14:11

## 2024-03-12 NOTE — PROGRESS NOTE ADULT - PROBLEM SELECTOR PLAN 1
~3 week sx of SOB, dry cough malaise and now with low grade fevers, PNA previously diagnosed 2/2024 and refractory to 2 courses outpatient (z-pack, cefdinir), not worse but w/o improvement. no wbc or bandemia, negative procal, negative RVP. Pulm following, suspicion for organizing PNA or malignancy given longstanding smoking hx however will need to evaluate if improved on broad spectrum first. Urine Legionella negative  - F/u Bcx - NGTD   - continue with IV Zosyn 3.375g q8h  - f/u pulm rec  - [facility] w/ BAL and TBBx on 3/11/24  - will plan for discharge after gross path is back for abx guidance

## 2024-03-12 NOTE — PROGRESS NOTE ADULT - PROBLEM SELECTOR PROBLEM 8
BP at goal  Continue current prescription meds. Keep appts with cards  
Prophylactic measure

## 2024-03-12 NOTE — PROGRESS NOTE ADULT - PROBLEM SELECTOR PLAN 2
Patient had 5 episodes of bowel movements on 3/9, also complains about a severe headache in the morning of 3/10. Home hydrocodone 10mg TID PRN restless leg syndrome on hold since admission. Etiology abx reaction vs withdrawal from opioids, less likely infectious. On 3/11 patient reports 6 episodes of small bowel movements, brown and formed.   - continue to monitor  - obtain GI PCR  - start lactobacillus 2 tabs TID

## 2024-03-12 NOTE — PROGRESS NOTE ADULT - PROBLEM SELECTOR PLAN 7
Home meds: Plaquenil 200 mg QD. Recently diagnosed and started in 10/2023   - hold Plaquenil in the setting of diarrhea

## 2024-03-12 NOTE — PROGRESS NOTE ADULT - SUBJECTIVE AND OBJECTIVE BOX
**INCOMPLETE NOTE    OVERNIGHT EVENTS:    SUBJECTIVE:  Patient seen and examined at bedside.  No fever, chills, dizziness, headache, changes in vision, chest pain, dyspnea, nausea or vomiting, dysuria, changes in bowel movements, LE edema. Rest of 12 point Review of systems negative unless otherwise documented elsewhere in note.     Vital Signs Last 12 Hrs  T(F): 98.2 (03-12-24 @ 06:10), Max: 98.2 (03-12-24 @ 06:10)  HR: 97 (03-12-24 @ 06:10) (97 - 118)  BP: 116/72 (03-12-24 @ 06:10) (115/67 - 116/72)  BP(mean): --  RR: 18 (03-12-24 @ 06:10) (18 - 18)  SpO2: 94% (03-12-24 @ 06:10) (94% - 95%)  I&O's Summary      PHYSICAL EXAM:  Constitutional: NAD, comfortable in bed.  HEENT: NC/AT, PERRLA, EOMI, no conjunctival pallor or scleral icterus, MMM  Neck: Supple, no JVD  Respiratory: CTA B/L. No w/r/r.   Cardiovascular: RRR, normal S1 and S2, no m/r/g.   Gastrointestinal: +BS, soft NTND, no guarding or rebound tenderness, no palpable masses   Extremities: wwp; no cyanosis, clubbing or edema.   Vascular: Pulses equal and strong throughout.   Neurological: AAOx3, no CN deficits, strength and sensation intact throughout.   Skin: No gross skin abnormalities or rashes        LABS:                        13.3   8.04  )-----------( 404      ( 12 Mar 2024 05:30 )             41.7     03-12    140  |  103  |  16  ----------------------------<  113<H>  4.2   |  27  |  0.66    Ca    9.3      12 Mar 2024 05:30  Phos  3.3     03-12  Mg     2.3     03-12    TPro  6.8  /  Alb  3.9  /  TBili  <0.2  /  DBili  x   /  AST  18  /  ALT  21  /  AlkPhos  104  03-11    PT/INR - ( 11 Mar 2024 05:30 )   PT: 11.6 sec;   INR: 1.02          PTT - ( 11 Mar 2024 05:30 )  PTT:36.3 sec  Urinalysis Basic - ( 12 Mar 2024 05:30 )    Color: x / Appearance: x / SG: x / pH: x  Gluc: 113 mg/dL / Ketone: x  / Bili: x / Urobili: x   Blood: x / Protein: x / Nitrite: x   Leuk Esterase: x / RBC: x / WBC x   Sq Epi: x / Non Sq Epi: x / Bacteria: x          RADIOLOGY & ADDITIONAL TESTS:    MEDICATIONS  (STANDING):  amitriptyline 100 milliGRAM(s) Oral daily  enoxaparin Injectable 40 milliGRAM(s) SubCutaneous every 24 hours  gabapentin 1200 milliGRAM(s) Oral at bedtime  hydrochlorothiazide 25 milliGRAM(s) Oral daily  lactobacillus acidophilus 2 Tablet(s) Oral three times a day with meals  levothyroxine 50 MICROGram(s) Oral daily  losartan 100 milliGRAM(s) Oral every 24 hours  pantoprazole    Tablet 40 milliGRAM(s) Oral before breakfast  piperacillin/tazobactam IVPB.. 3.375 Gram(s) IV Intermittent every 8 hours  rOPINIRole 2 milliGRAM(s) Oral at bedtime    MEDICATIONS  (PRN):  acetaminophen     Tablet .. 650 milliGRAM(s) Oral every 6 hours PRN Temp greater or equal to 38C (100.4F), Mild Pain (1 - 3)  aluminum hydroxide/magnesium hydroxide/simethicone Suspension 30 milliLiter(s) Oral every 6 hours PRN Dyspepsia  ondansetron Injectable 4 milliGRAM(s) IV Push every 8 hours PRN Nausea and/or Vomiting  oxyCODONE    IR 10 milliGRAM(s) Oral three times a day PRN Restless Leg

## 2024-03-12 NOTE — PROGRESS NOTE ADULT - PROBLEM SELECTOR PLAN 8
F: s/p 1.6 L NS in the ED   E: replete K<4, Mg<2  N: regular  VTE Prophylaxis: None  GI: pantoprazole   C: Full Code  D: F

## 2024-03-12 NOTE — PROGRESS NOTE ADULT - PROBLEM SELECTOR PLAN 6
CHIEF COMPLAINT:   Patient presents with      Possible Pregnancy        HISTORY OF PRESENT ILLNESS  Candida Montoya 34 y.o.  presents for pregnancy risk assessment.   The patient has no complaints today.  No nausea or vomiting. No bleeding or pain.  Pregnancy was planned.  Partner is supportive of pregnancy.  Lives at home with  and 5 year old.  Stay at home mother.  Denies domestic abuse.  Denies chemical/pesticide/radiation exposure. Pt desires TOLAC. Was a primary c/s with her last pregnancy after developing chorioamnionitis per pt.     OB history:      LMP: Patient's last menstrual period was 10/23/2019 (exact date).  EDC: Estimated Date of Delivery: None noted.  EGA: Unknown       Health Maintenance   Topic Date Due    Lipid Panel  1984    TETANUS VACCINE  2002    Pap Smear with HPV Cotest  2005       Past Medical History:   Diagnosis Date    Miscarriage        Past Surgical History:   Procedure Laterality Date     SECTION      DILATION AND CURETTAGE OF UTERUS  2016       Family History   Problem Relation Age of Onset    Cancer Paternal Aunt         breast    Hypertension Maternal Grandmother     Diabetes Maternal Grandmother     Breast cancer Maternal Grandmother        Social History     Socioeconomic History    Marital status:      Spouse name: Not on file    Number of children: Not on file    Years of education: Not on file    Highest education level: Not on file   Occupational History    Not on file   Social Needs    Financial resource strain: Not on file    Food insecurity:     Worry: Not on file     Inability: Not on file    Transportation needs:     Medical: Not on file     Non-medical: Not on file   Tobacco Use    Smoking status: Never Smoker   Substance and Sexual Activity    Alcohol use: No    Drug use: Never    Sexual activity: Yes     Partners: Male   Lifestyle    Physical activity:     Days per week: Not on file     Minutes  per session: Not on file    Stress: Not on file   Relationships    Social connections:     Talks on phone: Not on file     Gets together: Not on file     Attends Quaker service: Not on file     Active member of club or organization: Not on file     Attends meetings of clubs or organizations: Not on file     Relationship status: Not on file   Other Topics Concern    Not on file   Social History Narrative    Not on file       Current Outpatient Medications   Medication Sig Dispense Refill    -IRON-FOLATE 1-DSS-DHA ORAL Take 1 capsule by mouth once daily.       No current facility-administered medications for this visit.        Review of patient's allergies indicates:   Allergen Reactions    Azithromycin     Erythromycin-sulfisoxazole Swelling         PHYSICAL EXAM   Vitals:    01/10/20 0955   BP: 112/72        PAIN SCALE: 0/10 None    PHYSICAL EXAM    ROS:  GENERAL: No fever, chills, fatigability or weight loss.  CV: Denies chest pain  PULM: Denies shortness of breath or wheezing.  ABDOMEN: Appetite fine. No weight loss. Denies diarrhea, abdominal pain, hematemesis or blood in stool.  URINARY: No flank pain, dysuria or hematuria.  REPRODUCTIVE: No abnormal vaginal bleeding.       PE:   APPEARANCE: Well nourished, well developed, in no acute distress  CHEST: Clear to auscultation bilaterally  CV: Regular rate and rhythm  BREASTS: Symmetrical, no skin changes or visible lesions. No palpable masses, nipple discharge or adenopathy bilaterally.  ABDOMEN: Soft. No tenderness or masses. No hepatosplenomegaly. No hernias  PELVIC:   VULVA: No lesions. Normal female genitalia.  URETHRAL MEATUS: Normal size and location, no lesions, no prolapse.  URETHRA: No masses, tenderness, prolapse or scarring.  VAGINA: Moist and well rugated, no discharge, no significant cystocele or rectocele.  CERVIX: No lesions, normal diameter, no stenosis, no cervical motion tenderness.   UTERUS: appropriate to gestational size,  regular shape, mobile, non-tender, normal position, good support.  ADNEXA: No masses, tenderness or CDS nodularity.  ANUS PERINEUM: No lesions, no relaxation, no external hemorrhoids.     UPT +    A/P:     -      Patient was counseled today on A.C.S. Pap guidelines and recommendations for yearly pelvic exams, mammograms and monthly self breast exams; to see her PCP for other health maintenance and pregnancy.   -      Patient's medications and medical history reviewed with patient and implications in pregnancy.   -      Pregnancy course discussed and 'AtoZ' book given. Patient was counseled on proper weight gain based on the [place] [employer] [facility]'s recommendations based on her pre-pregnancy weight. Discussed foods to avoid in pregnancy (i.e. sushi, fish that are high in mercury, deli meat, and unpasteurized cheeses). Discussed prenatal vitamin options (i.e. stool softener, DHA). Discussed potential medical problems in pregnancy.  -     Discussed risk of Toxoplasmosis transmission from pets and reviewed risk reduction techniques.  -     Discused increased risks with AMA status.    -     Chromosomal abnormality risk discussed and available testing offered - declined.   -     Pt was counseled on exercise in pregnancy and weight gain recommendations.  -     Pt was counseled on travel recommendations and on risks of Zika virus exposure.  Current [facility] Zika advisories and prevention techniques were reviewed with pt.  Pt denies any recent international travel and does not plan travel during pregnancy.  Pt reports that partner does not plan travel either.  -     Oriented to practice including CNM collaboration.  - Desires TOLAC after  section x1, ISMAEL consents obtained and faxed for op report.   -    Dating US today, JAMAR 2020 based on CRL, SIUP, embryo grossly wnl with normal cardiac activity, normal uterus, cervix and adnexae as noted above.   - New OB labs today, Papsmear collected with HPV co-testing- small  bright red bleeding noted after collection  - f/u x 4 weeks for initial OB           Home meds: Hydrocodone/ acetaminophen 10mg-325mg TID PRN, Ropinirole 2mg qHS, Gabapentin 300mg 4 tabs qHS.   - continue home meds

## 2024-03-12 NOTE — DISCHARGE NOTE NURSING/CASE MANAGEMENT/SOCIAL WORK - PATIENT PORTAL LINK FT
You can access the FollowMyHealth Patient Portal offered by [facility] by registering at the following website: [email or web address]. By joining [employer]’s FollowMyHealth portal, you will also be able to view your health information using other applications (apps) compatible with our system.

## 2024-03-12 NOTE — PROGRESS NOTE ADULT - PROVIDER SPECIALTY LIST ADULT
Internal Medicine
Internal Medicine
Pulmonology
Internal Medicine
Pulmonology
Internal Medicine
Pulmonology
Internal Medicine

## 2024-03-12 NOTE — DISCHARGE NOTE NURSING/CASE MANAGEMENT/SOCIAL WORK - NSDCPEFALRISK_GEN_ALL_CORE
For information on Fall & Injury Prevention, visit: [email or web address] OR  [email or web address] OR  https://www.cdc.gov/steadi/patient.html

## 2024-03-12 NOTE — PROGRESS NOTE ADULT - ASSESSMENT
69 F with hx of RA (on plaquenil since 11/2023), restless leg syndrome on chronic hydrocodone, HTN, goiter, UGIB in nov 2023, large hiatal hernia who comes in for fever, malaise, and nonproductive cough. She reports nonspecific symptoms since mid-February. She started taking PO augmentin and went to urgent care where a CXR was done and showed LLL pneumonia, small effusion on 2/16, s/p augmentin/azithromycin with no improvement. CTA Thorax done 2/27 w/ multifocal patchy infiltrates in the left lung. Received another course of antibiotics with cefdinir but still reports persistnet dyspnea on exertion.    Pulmonary consulted given possible pneumonia refractory to 2 courses of antibiotics    Data Review:  negative RVP, COVID, RSV, Flu testing  CRP 5.2  procal 0.1  blood cultures NGTD  urine strep and legionella negative  Path - organizing pneumonia  BAL Cell count 249, 86% neutrophils, 2% lymphocytes, eosino 5%, 7% macrophages    # Left Lingula Organizing pneumonia  - malaise, dyspnea on exertion, no improvement with 2 courses of abx outpatient (augmentin/azithromycin/cefdinir)   - infectious work up here negative, no response to IV zosyn  - persistent left lingula infiltrate along w/ RUL consolidations  - s/p bronch w/ bx consistent with organizing pneumonia. BAL - neutrophil predominant    Recommend  - prednisone 60 mg daily for atleast 2 months with prolonged taper as outpatient with pulmonary follow up (Dr. Pineda or someone in Pulmonary down in [place] where she is from).   - DS Bactrim Three times a week for PJP ppx while on prednisone   - please provide patient with path results before discharge so she can transition care to [place] Pulmonary physician effectively     Patient S/E/D with Dr. Sood     69 F with hx of RA (on plaquenil since 11/2023), restless leg syndrome on chronic hydrocodone, HTN, goiter, UGIB in nov 2023, large hiatal hernia who comes in for fever, malaise, and nonproductive cough. She reports nonspecific symptoms since mid-February. She started taking PO augmentin and went to urgent care where a CXR was done and showed LLL pneumonia, small effusion on 2/16, s/p augmentin/azithromycin with no improvement. CTA Thorax done 2/27 w/ multifocal patchy infiltrates in the left lung. Received another course of antibiotics with cefdinir but still reports persistnet dyspnea on exertion.    Pulmonary consulted given possible pneumonia refractory to 2 courses of antibiotics    Data Review:  negative RVP, COVID, RSV, Flu testing  CRP 5.2  procal 0.1  blood cultures NGTD  urine strep and legionella negative  Path - organizing pneumonia  BAL Cell count 249, 86% neutrophils, 2% lymphocytes, eosino 5%, 7% macrophages    # Left Lingula Organizing pneumonia  - malaise, dyspnea on exertion, no improvement with 2 courses of abx outpatient (augmentin/azithromycin/cefdinir)   - infectious work up here negative, no response to IV zosyn  - persistent left lingula infiltrate along w/ RUL consolidations  - s/p bronch w/ bx consistent with organizing pneumonia. BAL - neutrophil predominant    Recommend  - prednisone 60 mg daily for 1 week, decrease to 40 mg daily therafter with follow up with Dr. Pineda or [place] Pulmonologist that she wants to establish care with. Will need at least 6 months of steroids with long taper.  - PJP ppx with DS Bactrim Three times a week or single strength bactrim daily with monitoring of kidney function outpatient  - please provide patient with path results before discharge so she can transition care to [place] Pulmonary physician effectively     Patient S/E/D with Dr. Sood

## 2024-03-12 NOTE — PROGRESS NOTE ADULT - SUBJECTIVE AND OBJECTIVE BOX
PULMONARY CONSULT SERVICE FOLLOW-UP NOTE    INTERVAL HPI:  Reviewed chart and overnight events; patient seen and examined. Patient with no acute events overnight after bronch, no hemoptysis or fevers. RODRIGUES present but otherwise feels well.    MEDICATIONS:  Pulmonary:    Antimicrobials:  piperacillin/tazobactam IVPB.. 3.375 Gram(s) IV Intermittent every 8 hours    Anticoagulants:  enoxaparin Injectable 40 milliGRAM(s) SubCutaneous every 24 hours    Cardiac:  hydrochlorothiazide 25 milliGRAM(s) Oral daily  losartan 100 milliGRAM(s) Oral every 24 hours      Allergies    No Known Allergies    Intolerances        Vital Signs Last 24 Hrs  T(C): 36.8 (12 Mar 2024 06:10), Max: 36.8 (12 Mar 2024 06:10)  T(F): 98.2 (12 Mar 2024 06:10), Max: 98.2 (12 Mar 2024 06:10)  HR: 97 (12 Mar 2024 06:10) (97 - 118)  BP: 116/72 (12 Mar 2024 06:10) (115/67 - 137/87)  BP(mean): --  RR: 18 (12 Mar 2024 06:10) (17 - 18)  SpO2: 94% (12 Mar 2024 06:10) (94% - 95%)    Parameters below as of 12 Mar 2024 06:10  Patient On (Oxygen Delivery Method): nasal cannula  O2 Flow (L/min): 2          PHYSICAL EXAM:  Constitutional: well-appearing, in no apparent respiratory distress  HEENT: NC/AT; PERRL, anicteric sclera; moist mucous membranes  Neck: supple, no JVD or LAD appreciated  Cardiovascular: +S1/S2, Regular rate and rhythm, no murmurs appreciated   Respiratory: Clear breath sounds bilaterally. No wheezes, rhonchi or rales   Gastrointestinal: soft, non-tender, non-distended. Normoactive bowel sounds.   Extremities: no edema, clubbing or cyanosis  Vascular: 2+ radial pulses B/L  Neurological: awake and alert; oriented x3    LABS:      CBC Full  -  ( 12 Mar 2024 05:30 )  WBC Count : 8.04 K/uL  RBC Count : 4.71 M/uL  Hemoglobin : 13.3 g/dL  Hematocrit : 41.7 %  Platelet Count - Automated : 404 K/uL  Mean Cell Volume : 88.5 fl  Mean Cell Hemoglobin : 28.2 pg  Mean Cell Hemoglobin Concentration : 31.9 gm/dL  Auto Neutrophil # : x  Auto Lymphocyte # : x  Auto Monocyte # : x  Auto Eosinophil # : x  Auto Basophil # : x  Auto Neutrophil % : x  Auto Lymphocyte % : x  Auto Monocyte % : x  Auto Eosinophil % : x  Auto Basophil % : x    03-12    140  |  103  |  16  ----------------------------<  113<H>  4.2   |  27  |  0.66    Ca    9.3      12 Mar 2024 05:30  Phos  3.3     03-12  Mg     2.3     03-12    TPro  6.8  /  Alb  3.9  /  TBili  <0.2  /  DBili  x   /  AST  18  /  ALT  21  /  AlkPhos  104  03-11    PT/INR - ( 11 Mar 2024 05:30 )   PT: 11.6 sec;   INR: 1.02          PTT - ( 11 Mar 2024 05:30 )  PTT:36.3 sec          RADIOLOGY & ADDITIONAL STUDIES:

## 2024-03-13 ENCOUNTER — TRANSCRIPTION ENCOUNTER (OUTPATIENT)
Age: 69
End: 2024-03-13

## 2024-03-13 LAB
CCP IGG SERPL-ACNC: <8 UNITS — SIGNIFICANT CHANGE UP
CULTURE RESULTS: SIGNIFICANT CHANGE UP
CULTURE RESULTS: SIGNIFICANT CHANGE UP
RF+CCP IGG SER-IMP: NEGATIVE — SIGNIFICANT CHANGE UP
SPECIMEN SOURCE: SIGNIFICANT CHANGE UP
SPECIMEN SOURCE: SIGNIFICANT CHANGE UP
TM INTERPRETATION: SIGNIFICANT CHANGE UP

## 2024-03-14 ENCOUNTER — NON-APPOINTMENT (OUTPATIENT)
Age: 69
End: 2024-03-14

## 2024-03-14 LAB
ANA TITR SER: NEGATIVE — SIGNIFICANT CHANGE UP
FUNGITELL B-D-GLUCAN,  BRONCHIAL LAVAGE: SIGNIFICANT CHANGE UP
NON-GYNECOLOGICAL CYTOLOGY STUDY: SIGNIFICANT CHANGE UP

## 2024-03-15 ENCOUNTER — TRANSCRIPTION ENCOUNTER (OUTPATIENT)
Age: 69
End: 2024-03-15

## 2024-03-15 LAB
% CD3 - BAL: 85 % — SIGNIFICANT CHANGE UP
% CD4 - BAL: 64 % — SIGNIFICANT CHANGE UP
% CD8 - BAL: 15 % — SIGNIFICANT CHANGE UP
CD4:CD8 RATIO - BAL: 4.27 RATIO — SIGNIFICANT CHANGE UP
P JIROVECII DNA L RESP QL NAA+NON-PROBE: NEGATIVE — SIGNIFICANT CHANGE UP
P JIROVECII DNA L RESP QL NAA+NON-PROBE: NEGATIVE — SIGNIFICANT CHANGE UP
SPECIMEN SOURCE: SIGNIFICANT CHANGE UP
SPECIMEN SOURCE: SIGNIFICANT CHANGE UP

## 2024-03-16 LAB — GALACTOMANNAN AG SERPL-ACNC: 0.48 INDEX — SIGNIFICANT CHANGE UP (ref 0–0.49)

## 2024-03-18 LAB — GALACTOMANNAN AG SERPL-ACNC: 0.32 INDEX — SIGNIFICANT CHANGE UP (ref 0–0.49)

## 2024-03-19 ENCOUNTER — EMERGENCY (EMERGENCY)
Facility: HOSPITAL | Age: 69
LOS: 1 days | Discharge: HOME | End: 2024-03-19
Attending: EMERGENCY MEDICINE | Admitting: EMERGENCY MEDICINE
Payer: MEDICARE

## 2024-03-19 VITALS
HEART RATE: 100 BPM | OXYGEN SATURATION: 94 % | TEMPERATURE: 98 F | WEIGHT: 199.96 LBS | SYSTOLIC BLOOD PRESSURE: 155 MMHG | RESPIRATION RATE: 18 BRPM | HEIGHT: 63 IN | DIASTOLIC BLOOD PRESSURE: 81 MMHG

## 2024-03-19 VITALS
RESPIRATION RATE: 18 BRPM | SYSTOLIC BLOOD PRESSURE: 140 MMHG | DIASTOLIC BLOOD PRESSURE: 81 MMHG | HEART RATE: 84 BPM | OXYGEN SATURATION: 96 %

## 2024-03-19 DIAGNOSIS — I10 ESSENTIAL (PRIMARY) HYPERTENSION: ICD-10-CM

## 2024-03-19 DIAGNOSIS — I48.91 UNSPECIFIED ATRIAL FIBRILLATION: ICD-10-CM

## 2024-03-19 DIAGNOSIS — J18.9 PNEUMONIA, UNSPECIFIED ORGANISM: ICD-10-CM

## 2024-03-19 DIAGNOSIS — E04.2 NONTOXIC MULTINODULAR GOITER: ICD-10-CM

## 2024-03-19 DIAGNOSIS — J43.9 EMPHYSEMA, UNSPECIFIED: ICD-10-CM

## 2024-03-19 DIAGNOSIS — K44.9 DIAPHRAGMATIC HERNIA WITHOUT OBSTRUCTION OR GANGRENE: ICD-10-CM

## 2024-03-19 DIAGNOSIS — E03.9 HYPOTHYROIDISM, UNSPECIFIED: ICD-10-CM

## 2024-03-19 DIAGNOSIS — M06.9 RHEUMATOID ARTHRITIS, UNSPECIFIED: ICD-10-CM

## 2024-03-19 DIAGNOSIS — K21.9 GASTRO-ESOPHAGEAL REFLUX DISEASE WITHOUT ESOPHAGITIS: ICD-10-CM

## 2024-03-19 DIAGNOSIS — G25.81 RESTLESS LEGS SYNDROME: ICD-10-CM

## 2024-03-19 DIAGNOSIS — R60.0 LOCALIZED EDEMA: ICD-10-CM

## 2024-03-19 LAB
ALBUMIN SERPL ELPH-MCNC: 3.9 G/DL — SIGNIFICANT CHANGE UP (ref 3.3–5)
ALBUMIN SERPL ELPH-MCNC: 4.5 G/DL — SIGNIFICANT CHANGE UP (ref 3.3–5)
ALP SERPL-CCNC: 111 U/L — SIGNIFICANT CHANGE UP (ref 40–120)
ALP SERPL-CCNC: 130 U/L — HIGH (ref 40–120)
ALT FLD-CCNC: 26 U/L — SIGNIFICANT CHANGE UP (ref 10–45)
ALT FLD-CCNC: SIGNIFICANT CHANGE UP (ref 10–45)
ANION GAP SERPL CALC-SCNC: 12 MMOL/L — SIGNIFICANT CHANGE UP (ref 5–17)
ANION GAP SERPL CALC-SCNC: 13 MMOL/L — SIGNIFICANT CHANGE UP (ref 5–17)
APTT BLD: 27.6 SEC — SIGNIFICANT CHANGE UP (ref 24.5–35.6)
AST SERPL-CCNC: 14 U/L — SIGNIFICANT CHANGE UP (ref 10–40)
AST SERPL-CCNC: SIGNIFICANT CHANGE UP (ref 10–40)
BASOPHILS # BLD AUTO: 0.03 K/UL — SIGNIFICANT CHANGE UP (ref 0–0.2)
BASOPHILS NFR BLD AUTO: 0.1 % — SIGNIFICANT CHANGE UP (ref 0–2)
BILIRUB SERPL-MCNC: <0.2 MG/DL — SIGNIFICANT CHANGE UP (ref 0.2–1.2)
BILIRUB SERPL-MCNC: <0.2 MG/DL — SIGNIFICANT CHANGE UP (ref 0.2–1.2)
BUN SERPL-MCNC: 25 MG/DL — HIGH (ref 7–23)
BUN SERPL-MCNC: 26 MG/DL — HIGH (ref 7–23)
CALCIUM SERPL-MCNC: 9.4 MG/DL — SIGNIFICANT CHANGE UP (ref 8.4–10.5)
CALCIUM SERPL-MCNC: 9.5 MG/DL — SIGNIFICANT CHANGE UP (ref 8.4–10.5)
CHLORIDE SERPL-SCNC: 100 MMOL/L — SIGNIFICANT CHANGE UP (ref 96–108)
CHLORIDE SERPL-SCNC: 102 MMOL/L — SIGNIFICANT CHANGE UP (ref 96–108)
CO2 SERPL-SCNC: 27 MMOL/L — SIGNIFICANT CHANGE UP (ref 22–31)
CO2 SERPL-SCNC: 27 MMOL/L — SIGNIFICANT CHANGE UP (ref 22–31)
CREAT SERPL-MCNC: 0.77 MG/DL — SIGNIFICANT CHANGE UP (ref 0.5–1.3)
CREAT SERPL-MCNC: 0.83 MG/DL — SIGNIFICANT CHANGE UP (ref 0.5–1.3)
EGFR: 76 ML/MIN/1.73M2 — SIGNIFICANT CHANGE UP
EGFR: 83 ML/MIN/1.73M2 — SIGNIFICANT CHANGE UP
EOSINOPHIL # BLD AUTO: 0.06 K/UL — SIGNIFICANT CHANGE UP (ref 0–0.5)
EOSINOPHIL NFR BLD AUTO: 0.3 % — SIGNIFICANT CHANGE UP (ref 0–6)
GLUCOSE SERPL-MCNC: 120 MG/DL — HIGH (ref 70–99)
GLUCOSE SERPL-MCNC: 97 MG/DL — SIGNIFICANT CHANGE UP (ref 70–99)
HCT VFR BLD CALC: 44.2 % — SIGNIFICANT CHANGE UP (ref 34.5–45)
HGB BLD-MCNC: 13.9 G/DL — SIGNIFICANT CHANGE UP (ref 11.5–15.5)
IMM GRANULOCYTES NFR BLD AUTO: 0.6 % — SIGNIFICANT CHANGE UP (ref 0–0.9)
INR BLD: 0.9 — SIGNIFICANT CHANGE UP (ref 0.85–1.18)
LYMPHOCYTES # BLD AUTO: 10.3 % — LOW (ref 13–44)
LYMPHOCYTES # BLD AUTO: 2.36 K/UL — SIGNIFICANT CHANGE UP (ref 1–3.3)
MCHC RBC-ENTMCNC: 28.5 PG — SIGNIFICANT CHANGE UP (ref 27–34)
MCHC RBC-ENTMCNC: 31.4 GM/DL — LOW (ref 32–36)
MCV RBC AUTO: 90.8 FL — SIGNIFICANT CHANGE UP (ref 80–100)
MONOCYTES # BLD AUTO: 1.52 K/UL — HIGH (ref 0–0.9)
MONOCYTES NFR BLD AUTO: 6.6 % — SIGNIFICANT CHANGE UP (ref 2–14)
NEUTROPHILS # BLD AUTO: 18.91 K/UL — HIGH (ref 1.8–7.4)
NEUTROPHILS NFR BLD AUTO: 82.1 % — HIGH (ref 43–77)
NRBC # BLD: 0 /100 WBCS — SIGNIFICANT CHANGE UP (ref 0–0)
NT-PROBNP SERPL-SCNC: 175 PG/ML — SIGNIFICANT CHANGE UP (ref 0–300)
PLATELET # BLD AUTO: 422 K/UL — HIGH (ref 150–400)
POTASSIUM SERPL-MCNC: 4.3 MMOL/L — SIGNIFICANT CHANGE UP (ref 3.5–5.3)
POTASSIUM SERPL-MCNC: SIGNIFICANT CHANGE UP (ref 3.5–5.3)
POTASSIUM SERPL-SCNC: 4.3 MMOL/L — SIGNIFICANT CHANGE UP (ref 3.5–5.3)
POTASSIUM SERPL-SCNC: SIGNIFICANT CHANGE UP (ref 3.5–5.3)
PROT SERPL-MCNC: 6.8 G/DL — SIGNIFICANT CHANGE UP (ref 6–8.3)
PROT SERPL-MCNC: 8 G/DL — SIGNIFICANT CHANGE UP (ref 6–8.3)
PROTHROM AB SERPL-ACNC: 10.3 SEC — SIGNIFICANT CHANGE UP (ref 9.5–13)
RBC # BLD: 4.87 M/UL — SIGNIFICANT CHANGE UP (ref 3.8–5.2)
RBC # FLD: 21.2 % — HIGH (ref 10.3–14.5)
SODIUM SERPL-SCNC: 139 MMOL/L — SIGNIFICANT CHANGE UP (ref 135–145)
SODIUM SERPL-SCNC: 142 MMOL/L — SIGNIFICANT CHANGE UP (ref 135–145)
TROPONIN T, HIGH SENSITIVITY RESULT: 8 NG/L — SIGNIFICANT CHANGE UP (ref 0–51)
TROPONIN T, HIGH SENSITIVITY RESULT: 9 NG/L — SIGNIFICANT CHANGE UP (ref 0–51)
WBC # BLD: 23.01 K/UL — HIGH (ref 3.8–10.5)
WBC # FLD AUTO: 23.01 K/UL — HIGH (ref 3.8–10.5)

## 2024-03-19 PROCEDURE — 85025 COMPLETE CBC W/AUTO DIFF WBC: CPT

## 2024-03-19 PROCEDURE — 93005 ELECTROCARDIOGRAM TRACING: CPT | Mod: 76

## 2024-03-19 PROCEDURE — 99285 EMERGENCY DEPT VISIT HI MDM: CPT | Mod: 25

## 2024-03-19 PROCEDURE — 85610 PROTHROMBIN TIME: CPT

## 2024-03-19 PROCEDURE — 93010 ELECTROCARDIOGRAM REPORT: CPT | Mod: 76

## 2024-03-19 PROCEDURE — 96374 THER/PROPH/DIAG INJ IV PUSH: CPT

## 2024-03-19 PROCEDURE — 71045 X-RAY EXAM CHEST 1 VIEW: CPT | Mod: 26

## 2024-03-19 PROCEDURE — 80053 COMPREHEN METABOLIC PANEL: CPT

## 2024-03-19 PROCEDURE — 36415 COLL VENOUS BLD VENIPUNCTURE: CPT

## 2024-03-19 PROCEDURE — 83880 ASSAY OF NATRIURETIC PEPTIDE: CPT

## 2024-03-19 PROCEDURE — 85730 THROMBOPLASTIN TIME PARTIAL: CPT

## 2024-03-19 PROCEDURE — 84484 ASSAY OF TROPONIN QUANT: CPT

## 2024-03-19 PROCEDURE — 71045 X-RAY EXAM CHEST 1 VIEW: CPT

## 2024-03-19 PROCEDURE — 99291 CRITICAL CARE FIRST HOUR: CPT

## 2024-03-19 RX ORDER — METOPROLOL TARTRATE 50 MG
5 TABLET ORAL ONCE
Refills: 0 | Status: COMPLETED | OUTPATIENT
Start: 2024-03-19 | End: 2024-03-19

## 2024-03-19 RX ORDER — METOPROLOL TARTRATE 50 MG
25 TABLET ORAL ONCE
Refills: 0 | Status: COMPLETED | OUTPATIENT
Start: 2024-03-19 | End: 2024-03-19

## 2024-03-19 RX ADMIN — Medication 5 MILLIGRAM(S): at 16:19

## 2024-03-19 RX ADMIN — Medication 25 MILLIGRAM(S): at 16:19

## 2024-03-19 NOTE — ED PROVIDER NOTE - CLINICAL SUMMARY MEDICAL DECISION MAKING FREE TEXT BOX
69F PMH RA (on plaquenil since 11/ 2023), restless leg syndrome on chronic hydrocodone, HTN, goiter/hypothyroidism, GERD, UGIB Nov 2023,   Was admitted 3/7-3/11 - had p/w fever/malaise/cough/SOB, was treated for multifocal PNA refractory to multiple abx regimens outpatient, had bronch, was dc'd on bactrim and prednisone.  CTA 3/7 showed "No pulmonary embolism through the level of the lobar and segmental pulmonary arteries. Evaluation of more distal subsegmental pulmonary arteries is limited by suboptimal contrast bolus timing. Multifocal pneumonia Emphysema Enlarged multinodular left thyroid lobe. Large hiatal hernia." Pt feels unchanged symptoms since being discharged (SOB, minimal cough, malaise) and followed up w/ Dr. Marin yesterday - she states that he noted that her HR was irregular and started her on eliquis and metoprolol. Today when she awoke she felt that her RLE was cold so she returned to Dr. Marin who referred to ED. On ROS pt notes chronic mild and unchanged b/l LE edema. No other systemic symptoms.   Tachycardic (HR 130s-150s), other vitals wnl. Exam as above.   ddx: Afib RVR - unclear how long pt has had it. Clinically benign RLE - clinically no arterial thrombus and low suspicion DVT.   Labs, CXR, attempt rate control, reassess.   Started on eliquis yesterday, unlikely PE.

## 2024-03-19 NOTE — ED ADULT TRIAGE NOTE - CHIEF COMPLAINT QUOTE
Patient c/o of SOB started yesterday, no chest pain, went to see Dr. Marin today, sent to ED due to irregular heart rhythm.  States was admitted to ED X 1 week last week for pneumonia.  EKG in progress.

## 2024-03-19 NOTE — ED PROVIDER NOTE - PATIENT PORTAL LINK FT
You can access the FollowMyHealth Patient Portal offered by [facility] by registering at the following website: [email or web address]. By joining [employer]’s FollowMyHealth portal, you will also be able to view your health information using other applications (apps) compatible with our system.

## 2024-03-19 NOTE — ED PROVIDER NOTE - DISCHARGE DATE
2021         RE: Nic Saul  [address]  [facility] [address]        Dear Colleague,    Thank you for referring your patient, Nic Saul, to the [facility]. Please see a copy of my visit note below.    HPI    This 66 year old patient is here for the f/u. He has been having recurrent salivary gland infections in his left submandibular gland for the past several months. Treated with antibiotics when it flared up. Denies any dehydration, recent surgery, trauma, viral infections, salivary gland stones, hypothyroidism, or diabetes.    Problem List     Hyperlipidemia LDL goal <160  Amyloidosis of skin (H)  Advanced directives, counseling/discussion  PINZON (dyspnea on exertion)     Medications     Prior Authorizations  tacrolimus (PROTOPIC) 0.1 % ointment  cephALEXin (KEFLEX) 500 MG capsule()  fluorouracil (EFUDEX) 5 % cream  Hydrocortisone 1 % CREA rectal cream     IMPRESSION: US Neck  Mildly prominent left submandibular gland at the site of palpable  concern with a prominent lymph node adjacent to or along the periphery  of the submandibular gland, as well as a prominent duct running  through the submandibular gland. No definite stones are identified.  Findings suggestive of mild sialadenitis.     Review of Systems   Constitutional: Negative.    HENT: Positive for sore throat. Negative for congestion, ear discharge, ear pain, hearing loss, nosebleeds, sinus pain and tinnitus.    Eyes: Negative for blurred vision, double vision and photophobia.   Respiratory: Negative for cough, hemoptysis, sputum production and stridor.    Gastrointestinal: Negative for heartburn, nausea and vomiting.   Skin: Negative.    Neurological: Negative for dizziness, tingling and headaches.   Endo/Heme/Allergies: Negative for environmental allergies. Does not bruise/bleed easily.       Physical Exam  Vitals signs reviewed.   Constitutional:       Appearance: Normal appearance.   HENT:       Head: Normocephalic and atraumatic.      Right Ear: Hearing, tympanic membrane, ear canal and external ear normal. There is no impacted cerumen.      Left Ear: Hearing, tympanic membrane, ear canal and external ear normal. There is impacted cerumen.      Nose: No septal deviation, mucosal edema, congestion or rhinorrhea.      Right Turbinates: Not enlarged or swollen.      Left Turbinates: Not enlarged or swollen.      Mouth/Throat:      Mouth: Mucous membranes are moist.      Pharynx: Oropharynx is clear. Uvula midline.   Eyes:      Extraocular Movements: Extraocular movements intact.      Pupils: Pupils are equal, round, and reactive to light.   Neurological:      Mental Status: He is alert.     Left submandibular gland was dilated with several dilators and no purulent secretion was observed from the left [place] duct.    A/P    This pleasant patient is having left submandibular pathology, likely sialolithiasis. I will continue with good hydration+ Submandibular massages. F/u as needed. His questions were answered.        Again, thank you for allowing me to participate in the care of your patient.        Sincerely,        Monisha Fitzgerald MD     19-Mar-2024

## 2024-03-19 NOTE — ED PROVIDER NOTE - OBJECTIVE STATEMENT
69F PMH RA (on plaquenil since 11/ 2023), restless leg syndrome on chronic hydrocodone, HTN, goiter/hypothyroidism, GERD, UGIB Nov 2023,   Was admitted 3/7-3/11 - had p/w fever/malaise/cough/SOB, was treated for multifocal PNA refractory to multiple abx regimens outpatient, had bronch, was dc'd on bactrim and prednisone.  CTA 3/7 showed "No pulmonary embolism through the level of the lobar and segmental pulmonary arteries. Evaluation of more distal subsegmental pulmonary arteries is limited by suboptimal contrast bolus timing. Multifocal pneumonia Emphysema Enlarged multinodular left thyroid lobe. Large hiatal hernia." Pt feels unchanged symptoms since being discharged (SOB, minimal cough, malaise) and followed up w/ Dr. Marin yesterday - she states that he noted that her HR was irregular and started her on eliquis and metoprolol. Today when she awoke she felt that her RLE was cold so she returned to Dr. Marin who referred to ED. On ROS pt notes chronic mild and unchanged b/l LE edema. No other systemic symptoms.   Denies LE pain, HA, f/c, CP, NVD, abd pain, urinary complaints, palpitations, lightheaded, diaphoresis.

## 2024-03-19 NOTE — ED CLERICAL - NS ED CLERK NOTE PRE-ARRIVAL INFORMATION; ADDITIONAL PRE-ARRIVAL INFORMATION
This patient is enrolled in the COPD/PNA STARs readmission reduction program and has active care navigation. This patient can be followed up by the care navigation team within 24 hours. To arrange close follow-up or to obtain additional clinical information about this patient, please call the contact number above and please contact the on call pulmonary fellow (Long range pager is [phone]). Please also alert either Dr. Pizano or the hospitalist on call [phone] PRIOR to admission. Dr. Pizano can be reached at [phone].

## 2024-03-19 NOTE — ED PROVIDER NOTE - NSFOLLOWUPINSTRUCTIONS_ED_ALL_ED_FT
Can take tylenol 650mg every 6hrs as needed for mild pain.    Take your home medications as prescribed.     Follow up with cardiologist as soon as possible. Can also call [phone] ([facility]) to schedule appointment.    Return to ER sooner for worsening breathing, worsening chest pain, worsening lightheaded, fevers, persistent vomiting or overall feeling much worse.     Atrial Fibrillation    Atrial fibrillation is a type of irregular heartbeat (arrhythmia) where the heart quivers continuously in a chaotic pattern that makes the heart unable to pump blood normally. This can increase the risk for stroke, heart failure, and other heart-related conditions. Atrial fibrillation can be caused by a variety of conditions and may be temporary, intermittent, or permanent. Symptoms include feeling that your heart is beating rapidly or irregularly, chest discomfort, shortness of breath, or dizziness/lightheadedness that may be worse with exertion. Treatment is varied but may involve medication or electrical shock (cardioversion).    SEEK IMMEDIATE MEDICAL CARE IF YOU HAVE ANY OF THE FOLLOWING SYMPTOMS: chest pain, shortness of breath, abdominal pain, sweating, vomiting, blood in vomit/bowel movements/urine, dizziness/lightheadedness, weakness or numbness to face/arm/leg, trouble speaking or understanding, facial droop.     Follow up with cardiologist. Can call [phone] ([facility]) to schedule appointment.    Can also call the following offices:    Dr. Mariah Gasca, Dr. Nathaniel Gibson  [phone]  [address], [address]  [place], [place]    Dr. Ugo Olivo  [phone]  [address] [facility], [address]  [place], [place]    Dr. Sampson Cantrell  [phone]    [address], [address] [place], [place]  [phone]  Dr. Reji Raymundo, Dr. Femi Higginbotham, Dr. Catie Marino, Dr. Reji Collazo, Dr. Gage Baldwin, Dr. Rick Vasquez, Dr. Juany Restrepo, Dr. Ugo Olivo    [address], [address]  [place], [place]  [phone]  Dr. Carlos Levine, Dr. Chinyere Velazquez, Dr. Lorena Gaytan, Dr. Sampson Cantrell, Dr. Gloria Hwang, Dr. Migue Ortega, Dr. Emily Tucker    [address], 4th floor  [place]  [phone]  Dr. Apollo Ortega, Dr. Kulwinder Shell, Dr. Domingo Pascal, Dr. Rick Shelton, Dr. Nathainel Gibson, Dr. Castro Velez    [address], [address] [facility]  [phone]  Dr. Cem Delacruz, Dr. Luis Alberto Olivarez, Dr. Kin Huynh, Dr. Lorena Gaytan, Dr. Gomez Jaramillo, Dr. Migue Ortega, Dr. Jerad Burt, Dr. Gold Vasquez, Dr. Tha Almonte    [phone]  Dr. Jasmeet Chowdhury    [phone]  Dr. Yelitza Pierce, Dr. Rick Vasquez    [address]  [place], [place]  [phone]  Dr. Mariah Gasca    [phone]  Dr. Teri Mckeon, Dr. Nader Hoover, Dr. Johana Baig, Dr. Jordi Venegas, Dr. Arcelia Gibbs, Dr. Darrel Medley, Dr. Gloria Walker, Dr. Dany Benjamin, Dr. Juany Restrepo    [phone]  Dr. Joseph Delgado, Dr. Gold Vasquez, Dr. Tha Almonte    [address] [facility], [address]  [place], [place]  [phone]  Dr. Jose Goel, Dr. Simeon Molina, Dr. Ralph Egan, Dr. Nilo Aguilar    [address] [place], [place]  [phone]  Dr. Jose Goel    [address]  [place], [place]  [phone]  Dr. Rick Vasquez    [address]  [place], [place]  [phone]  Dr. Ralph Egan    [address]  [place], [place]  [phone]  Dr. Emily Tucker    [address]  [place], [place]  [phone]  Dr. Rick Vasquez    [address]  [place], [place]  [phone]  Dr. Reji Raymundo    [address], [address]  [place], [place]  [phone]  Dr. Jose Goel, Dr. Simeon Molina, Dr. Ralph Egan, Dr. Nilo Aguilar    [address] [facility] [address] [facility], [place]  [phone]  Dr. Nathaniel Gibson    [address] [place] [address] [phone]  Dr. Kin Huynh, Dr. Lorena Gaytan, Dr. Michael Multani    [address] [place], [place]  [phone]  Dr. Jose Goel, Dr. Simeon Molina, Dr. Ralph Egan, Dr. Nilo Aguilar

## 2024-03-19 NOTE — ED PROVIDER NOTE - PROGRESS NOTE DETAILS
Klepfish: WBC 23.01 (likely 2/2 steroids, has no new infectious symptoms), plt 422, K/AST/ALT hemolyzed, alk phos 130, trop 9, other labs grossly wnl. CXR grossly unchanged from prior. HR now seems NSR. Will rpt EKG/trop, will reassess. Updated pt. Remains well appearing. Klepfish: Rpt trop 8, pt now in NSR, Remains very well appearing, d/w Dr. Marin, recommending outpt f/u.   Discussed importance of outpt follow up and return precautions. Clinically no indication for further emergent ED workup or hospitalization at this time. Stable for dc, outpt f/u.

## 2024-03-19 NOTE — ED PROVIDER NOTE - PHYSICAL EXAMINATION
trace b/l LE edema - at baseline per pt  No crepitus, firmness, induration, fluctuance. Skin is normal temp. No erythema/warmth. No obvious skin breaks.   normal cap refill, normal equal DP pulses.

## 2024-03-19 NOTE — ED ADULT NURSE NOTE - OBJECTIVE STATEMENT
Patient to the ED from Dr. Marin's office for new onset a-fib RVR. Upon ED arrival, patient HR WNL but converted back to RVR once roomed. Patient c/o sob with chest pain. Recent hospitalization r/t PNA dx. Denies new swelling. Started eliquis yesterday. Speaking in complete sentences, AAOx4, NAD.

## 2024-03-20 ENCOUNTER — TRANSCRIPTION ENCOUNTER (OUTPATIENT)
Age: 69
End: 2024-03-20

## 2024-03-20 DIAGNOSIS — R19.7 DIARRHEA, UNSPECIFIED: ICD-10-CM

## 2024-03-20 DIAGNOSIS — K44.9 DIAPHRAGMATIC HERNIA WITHOUT OBSTRUCTION OR GANGRENE: ICD-10-CM

## 2024-03-20 DIAGNOSIS — E03.9 HYPOTHYROIDISM, UNSPECIFIED: ICD-10-CM

## 2024-03-20 DIAGNOSIS — J84.89 OTHER SPECIFIED INTERSTITIAL PULMONARY DISEASES: ICD-10-CM

## 2024-03-20 DIAGNOSIS — J18.9 PNEUMONIA, UNSPECIFIED ORGANISM: ICD-10-CM

## 2024-03-20 DIAGNOSIS — I10 ESSENTIAL (PRIMARY) HYPERTENSION: ICD-10-CM

## 2024-03-20 DIAGNOSIS — M06.9 RHEUMATOID ARTHRITIS, UNSPECIFIED: ICD-10-CM

## 2024-03-20 DIAGNOSIS — K21.9 GASTRO-ESOPHAGEAL REFLUX DISEASE WITHOUT ESOPHAGITIS: ICD-10-CM

## 2024-03-20 DIAGNOSIS — G25.81 RESTLESS LEGS SYNDROME: ICD-10-CM

## 2024-03-20 DIAGNOSIS — R51.9 HEADACHE, UNSPECIFIED: ICD-10-CM

## 2024-03-26 ENCOUNTER — APPOINTMENT (OUTPATIENT)
Dept: PULMONOLOGY | Facility: CLINIC | Age: 69
End: 2024-03-26
Payer: MEDICARE

## 2024-03-26 VITALS
RESPIRATION RATE: 16 BRPM | DIASTOLIC BLOOD PRESSURE: 70 MMHG | TEMPERATURE: 97.6 F | WEIGHT: 213 LBS | BODY MASS INDEX: 37.74 KG/M2 | SYSTOLIC BLOOD PRESSURE: 118 MMHG | HEIGHT: 63 IN | HEART RATE: 88 BPM | OXYGEN SATURATION: 99 %

## 2024-03-26 DIAGNOSIS — J84.9 INTERSTITIAL PULMONARY DISEASE, UNSPECIFIED: ICD-10-CM

## 2024-03-26 PROCEDURE — 99204 OFFICE O/P NEW MOD 45 MIN: CPT | Mod: 25

## 2024-03-26 PROCEDURE — 71046 X-RAY EXAM CHEST 2 VIEWS: CPT

## 2024-03-26 RX ORDER — AZATHIOPRINE 50 1/1
50 TABLET ORAL
Qty: 60 | Refills: 10 | Status: ACTIVE | COMMUNITY
Start: 2024-03-26 | End: 1900-01-01

## 2024-03-26 RX ORDER — AZITHROMYCIN 250 MG/1
250 TABLET, FILM COATED ORAL
Qty: 90 | Refills: 3 | Status: ACTIVE | COMMUNITY
Start: 2024-03-26 | End: 1900-01-01

## 2024-03-27 NOTE — REVIEW OF SYSTEMS
[Cough] : no cough [Sputum] : no sputum [Dyspnea] : dyspnea [Negative] : Hematologic [TextBox_3] : mostlhy sufferering from prednisone side effects,  cannot tolerate them [TextBox_122] : hyper from steroids

## 2024-03-27 NOTE — HISTORY OF PRESENT ILLNESS
[TextBox_4] : oct had covid nov bleeding from abdomen, could not find the source told of need to take hiatal hernia  feb 17 pneuonia started unbelieveable dry cough medicenter antibiotics for ten days didn't get better on anttibiotics went to [facility] got the imaging now in the middle of the left of ame left lung, has moved around to different locations.  mostly adjacent to the left chest wall  placed on different antibiotic not getting better but not worse oxgen was low heart rate was high given iv antibiotic transbronchial bipsy was done and organizing pneumonia. short of breath, and prednsione side effects. down to 40mg of prednisoone heart rate was 160 and given metropolol in ER

## 2024-03-27 NOTE — PHYSICAL EXAM
[No Acute Distress] : no acute distress [Normal Oropharynx] : normal oropharynx [Normal Appearance] : normal appearance [No Neck Mass] : no neck mass [Normal Rate/Rhythm] : normal rate/rhythm [Normal S1, S2] : normal s1, s2 [No Resp Distress] : no resp distress [No Murmurs] : no murmurs [No Abnormalities] : no abnormalities [Clear to Auscultation Bilaterally] : clear to auscultation bilaterally [Benign] : benign [Normal Gait] : normal gait [No Clubbing] : no clubbing [No Cyanosis] : no cyanosis [No Edema] : no edema [FROM] : FROM [Normal Color/ Pigmentation] : normal color/ pigmentation [No Focal Deficits] : no focal deficits [Normal Affect] : normal affect [Oriented x3] : oriented x3

## 2024-03-27 NOTE — DISCUSSION/SUMMARY
[FreeTextEntry1] : i always question a nadia of "organizing pneumonia" with a tblbx away alivia klein reading does this truly havve the fieature of organizing pneumonia  i will bring prednisone to 20 mg a day add azothiprine and azithromycin first 50mg and then 100mg  return in two weeeks.  check blood and then raise azithiorpine

## 2024-04-04 ENCOUNTER — LABORATORY RESULT (OUTPATIENT)
Age: 69
End: 2024-04-04

## 2024-04-04 ENCOUNTER — APPOINTMENT (OUTPATIENT)
Dept: PULMONOLOGY | Facility: CLINIC | Age: 69
End: 2024-04-04
Payer: MEDICARE

## 2024-04-04 VITALS
DIASTOLIC BLOOD PRESSURE: 60 MMHG | WEIGHT: 213 LBS | HEIGHT: 63 IN | TEMPERATURE: 97.9 F | BODY MASS INDEX: 37.74 KG/M2 | OXYGEN SATURATION: 98 % | HEART RATE: 70 BPM | SYSTOLIC BLOOD PRESSURE: 140 MMHG

## 2024-04-04 PROCEDURE — 99214 OFFICE O/P EST MOD 30 MIN: CPT | Mod: 25

## 2024-04-04 PROCEDURE — 71046 X-RAY EXAM CHEST 2 VIEWS: CPT

## 2024-04-05 LAB
ALBUMIN SERPL ELPH-MCNC: 4.5 G/DL
ALP BLD-CCNC: 99 U/L
ALT SERPL-CCNC: 24 U/L
ANION GAP SERPL CALC-SCNC: 15 MMOL/L
AST SERPL-CCNC: 18 U/L
BASOPHILS # BLD AUTO: 0.02 K/UL
BASOPHILS NFR BLD AUTO: 0.2 %
BILIRUB SERPL-MCNC: 0.2 MG/DL
BUN SERPL-MCNC: 22 MG/DL
CALCIUM SERPL-MCNC: 9.8 MG/DL
CHLORIDE SERPL-SCNC: 98 MMOL/L
CO2 SERPL-SCNC: 28 MMOL/L
CREAT SERPL-MCNC: 0.81 MG/DL
EGFR: 78 ML/MIN/1.73M2
EOSINOPHIL # BLD AUTO: 0 K/UL
EOSINOPHIL NFR BLD AUTO: 0 %
GLUCOSE SERPL-MCNC: 155 MG/DL
HCT VFR BLD CALC: 39.4 %
HGB BLD-MCNC: 12.3 G/DL
IMM GRANULOCYTES NFR BLD AUTO: 0.6 %
LYMPHOCYTES # BLD AUTO: 0.49 K/UL
LYMPHOCYTES NFR BLD AUTO: 3.7 %
MAN DIFF?: NORMAL
MCHC RBC-ENTMCNC: 29.2 PG
MCHC RBC-ENTMCNC: 31.2 GM/DL
MCV RBC AUTO: 93.6 FL
MONOCYTES # BLD AUTO: 0.11 K/UL
MONOCYTES NFR BLD AUTO: 0.8 %
NEUTROPHILS # BLD AUTO: 12.56 K/UL
NEUTROPHILS NFR BLD AUTO: 94.7 %
PLATELET # BLD AUTO: 363 K/UL
POTASSIUM SERPL-SCNC: 4.5 MMOL/L
PROT SERPL-MCNC: 7.1 G/DL
RBC # BLD: 4.21 M/UL
RBC # FLD: 20.3 %
SODIUM SERPL-SCNC: 141 MMOL/L
WBC # FLD AUTO: 13.26 K/UL

## 2024-04-05 RX ORDER — PREDNISONE 5 MG/1
5 TABLET ORAL
Qty: 90 | Refills: 5 | Status: ACTIVE | COMMUNITY
Start: 2024-04-05 | End: 1900-01-01

## 2024-04-05 NOTE — PROCEDURE
[FreeTextEntry1] : chest film without change poorly deliniated slight density on the left lower l lobe

## 2024-04-05 NOTE — HISTORY OF PRESENT ILLNESS
[TextBox_4] : fu for  was in hospitali for this sent home on 40mg of pred and couldn't tolerated them, emotional, frenetic, anxious now on 20mg a still with similar symptoms on azithiorprine and azithomycin in an attempt to wean chest film looks good with light left pleural based infilration oxygenation is good and lungs are clear

## 2024-04-05 NOTE — PHYSICAL EXAM
[No Acute Distress] : no acute distress [Normal Oropharynx] : normal oropharynx [Normal Appearance] : normal appearance [No Neck Mass] : no neck mass [Normal Rate/Rhythm] : normal rate/rhythm [Normal S1, S2] : normal s1, s2 [No Murmurs] : no murmurs [Clear to Auscultation Bilaterally] : clear to auscultation bilaterally [No Resp Distress] : no resp distress [No Abnormalities] : no abnormalities [Benign] : benign [Normal Gait] : normal gait [No Clubbing] : no clubbing [No Cyanosis] : no cyanosis [FROM] : FROM [Normal Color/ Pigmentation] : normal color/ pigmentation [No Focal Deficits] : no focal deficits [Oriented x3] : oriented x3 [Normal Affect] : normal affect [TextBox_105] : some leg swelling

## 2024-04-05 NOTE — DISCUSSION/SUMMARY
[FreeTextEntry1] : reduce to 15 mg of prednionse continue with 100mg azithioprine and azithromycin return in three weeks

## 2024-04-10 LAB
CULTURE RESULTS: ABNORMAL
CULTURE RESULTS: SIGNIFICANT CHANGE UP
SPECIMEN SOURCE: SIGNIFICANT CHANGE UP
SPECIMEN SOURCE: SIGNIFICANT CHANGE UP

## 2024-04-27 LAB
CULTURE RESULTS: SIGNIFICANT CHANGE UP
CULTURE RESULTS: SIGNIFICANT CHANGE UP
SPECIMEN SOURCE: SIGNIFICANT CHANGE UP
SPECIMEN SOURCE: SIGNIFICANT CHANGE UP

## 2024-04-29 ENCOUNTER — APPOINTMENT (OUTPATIENT)
Dept: PULMONOLOGY | Facility: CLINIC | Age: 69
End: 2024-04-29
Payer: MEDICARE

## 2024-04-29 VITALS
HEIGHT: 63 IN | HEART RATE: 88 BPM | SYSTOLIC BLOOD PRESSURE: 130 MMHG | BODY MASS INDEX: 37.74 KG/M2 | TEMPERATURE: 97.9 F | OXYGEN SATURATION: 97 % | DIASTOLIC BLOOD PRESSURE: 76 MMHG | WEIGHT: 213 LBS

## 2024-04-29 PROCEDURE — 71046 X-RAY EXAM CHEST 2 VIEWS: CPT

## 2024-04-29 PROCEDURE — 94060 EVALUATION OF WHEEZING: CPT

## 2024-04-29 PROCEDURE — 94729 DIFFUSING CAPACITY: CPT

## 2024-04-29 PROCEDURE — ZZZZZ: CPT

## 2024-04-29 PROCEDURE — 94727 GAS DIL/WSHOT DETER LNG VOL: CPT

## 2024-04-29 PROCEDURE — 99214 OFFICE O/P EST MOD 30 MIN: CPT | Mod: 25

## 2024-04-30 NOTE — REASON FOR VISIT
[Follow-Up] : a follow-up visit [TextBox_44] : patient with , doing well, although she doesn't think so

## 2024-04-30 NOTE — HISTORY OF PRESENT ILLNESS
[TextBox_4] : here chest film is stable, treatment for  i rapidly decreased prednisone and added azithioprin and azithromycin and there only  a slight residua in the left latera lung field on chest flm and ct scan this may be old,  she has gained over 25 lbs, and this is likley why she is sob there is also a hiatal hernia containing stomach and fat and i think this needs to be repaired rosa since she is  symptomatic she still complains of the side effect of the prednisone at 15 mg  i will drop that to ten mg for a month and then repeat meanwhils suggest surgical repair of hiatal hernis

## 2024-04-30 NOTE — PROCEDURE
[FreeTextEntry1] : ful pfts show restriction and slight diffusion defect this may be influenced by her obesity as well the ct shows hiatal hernial with food in it and omental fat

## 2024-04-30 NOTE — PHYSICAL EXAM
[No Acute Distress] : no acute distress [Normal Oropharynx] : normal oropharynx [Normal Appearance] : normal appearance [No Neck Mass] : no neck mass [Normal Rate/Rhythm] : normal rate/rhythm [Normal S1, S2] : normal s1, s2 [No Murmurs] : no murmurs [No Resp Distress] : no resp distress [Clear to Auscultation Bilaterally] : clear to auscultation bilaterally [No Abnormalities] : no abnormalities [Benign] : benign [Normal Gait] : normal gait [No Clubbing] : no clubbing [No Cyanosis] : no cyanosis [FROM] : FROM [Normal Color/ Pigmentation] : normal color/ pigmentation [No Focal Deficits] : no focal deficits [Oriented x3] : oriented x3 [Normal Affect] : normal affect [TextBox_105] : some leg swelling

## 2024-04-30 NOTE — DISCUSSION/SUMMARY
[FreeTextEntry1] : vinita bring prednisone down to 10mg and  fu in a month meanwhile suggesting surgical repair of the hernia continje with azithioprin and azithromcyin return in a month

## 2024-05-13 PROBLEM — Z87.19 HISTORY OF GALLBLADDER DISEASE: Status: RESOLVED | Noted: 2024-05-13 | Resolved: 2024-05-13

## 2024-05-13 PROBLEM — Z87.39 HISTORY OF BACK PAIN: Status: RESOLVED | Noted: 2024-05-13 | Resolved: 2024-05-13

## 2024-05-13 PROBLEM — R06.02 SOB (SHORTNESS OF BREATH) ON EXERTION: Status: ACTIVE | Noted: 2024-05-13

## 2024-05-13 RX ORDER — LOSARTAN POTASSIUM 100 MG/1
100 TABLET, FILM COATED ORAL DAILY
Refills: 0 | Status: ACTIVE | COMMUNITY

## 2024-05-13 RX ORDER — HYDROCHLOROTHIAZIDE 25 MG/1
25 TABLET ORAL DAILY
Refills: 0 | Status: ACTIVE | COMMUNITY

## 2024-05-13 RX ORDER — GABAPENTIN 300 MG/1
300 CAPSULE ORAL DAILY
Refills: 0 | Status: ACTIVE | COMMUNITY

## 2024-05-13 RX ORDER — LEVOTHYROXINE SODIUM 50 UG/1
50 TABLET ORAL DAILY
Refills: 0 | Status: ACTIVE | COMMUNITY

## 2024-05-13 RX ORDER — ROPINIROLE 2 MG/1
2 TABLET, FILM COATED ORAL
Refills: 0 | Status: ACTIVE | COMMUNITY

## 2024-05-13 RX ORDER — HYDROCODONE BITARTRATE AND ACETAMINOPHEN 10; 325 MG/1; MG/1
10-325 TABLET ORAL
Refills: 0 | Status: ACTIVE | COMMUNITY

## 2024-05-13 RX ORDER — DEXLANSOPRAZOLE 60 MG/1
60 CAPSULE, DELAYED RELEASE ORAL DAILY
Refills: 0 | Status: ACTIVE | COMMUNITY

## 2024-05-13 RX ORDER — SULFAMETHOXAZOLE AND TRIMETHOPRIM 400; 80 MG/1; MG/1
400-80 TABLET ORAL DAILY
Refills: 0 | Status: ACTIVE | COMMUNITY

## 2024-05-13 RX ORDER — AMITRIPTYLINE HYDROCHLORIDE 10 MG/1
10 TABLET, FILM COATED ORAL
Refills: 0 | Status: ACTIVE | COMMUNITY

## 2024-05-16 ENCOUNTER — APPOINTMENT (OUTPATIENT)
Dept: THORACIC SURGERY | Facility: CLINIC | Age: 69
End: 2024-05-16
Payer: MEDICARE

## 2024-05-16 DIAGNOSIS — Z87.19 PERSONAL HISTORY OF OTHER DISEASES OF THE DIGESTIVE SYSTEM: ICD-10-CM

## 2024-05-16 DIAGNOSIS — Z87.39 PERSONAL HISTORY OF OTHER DISEASES OF THE MUSCULOSKELETAL SYSTEM AND CONNECTIVE TISSUE: ICD-10-CM

## 2024-05-16 DIAGNOSIS — R06.02 SHORTNESS OF BREATH: ICD-10-CM

## 2024-05-23 ENCOUNTER — APPOINTMENT (OUTPATIENT)
Dept: THORACIC SURGERY | Facility: CLINIC | Age: 69
End: 2024-05-23
Payer: MEDICARE

## 2024-05-23 ENCOUNTER — NON-APPOINTMENT (OUTPATIENT)
Age: 69
End: 2024-05-23

## 2024-05-23 VITALS
DIASTOLIC BLOOD PRESSURE: 68 MMHG | HEART RATE: 111 BPM | SYSTOLIC BLOOD PRESSURE: 116 MMHG | BODY MASS INDEX: 37.21 KG/M2 | OXYGEN SATURATION: 89 % | HEIGHT: 63 IN | WEIGHT: 210 LBS | TEMPERATURE: 97.3 F

## 2024-05-23 DIAGNOSIS — Z86.69 PERSONAL HISTORY OF OTHER DISEASES OF THE NERVOUS SYSTEM AND SENSE ORGANS: ICD-10-CM

## 2024-05-23 DIAGNOSIS — Z86.79 PERSONAL HISTORY OF OTHER DISEASES OF THE CIRCULATORY SYSTEM: ICD-10-CM

## 2024-05-23 DIAGNOSIS — E04.1 NONTOXIC SINGLE THYROID NODULE: ICD-10-CM

## 2024-05-23 DIAGNOSIS — Z87.19 PERSONAL HISTORY OF OTHER DISEASES OF THE DIGESTIVE SYSTEM: ICD-10-CM

## 2024-05-23 PROCEDURE — 99205 OFFICE O/P NEW HI 60 MIN: CPT

## 2024-05-24 PROBLEM — Z87.19 HISTORY OF GASTROINTESTINAL HEMORRHAGE: Status: RESOLVED | Noted: 2024-05-24 | Resolved: 2024-05-24

## 2024-05-24 PROBLEM — Z86.79 HISTORY OF VARICOSE VEINS: Status: RESOLVED | Noted: 2024-05-24 | Resolved: 2024-05-24

## 2024-05-24 PROBLEM — Z86.69 HISTORY OF RESTLESS LEGS SYNDROME: Status: RESOLVED | Noted: 2024-05-24 | Resolved: 2024-05-24

## 2024-05-24 RX ORDER — PREDNISONE 5 MG/5ML
5 SOLUTION ORAL
Qty: 120 | Refills: 0 | Status: DISCONTINUED | COMMUNITY
Start: 2024-04-04 | End: 2024-05-24

## 2024-05-24 NOTE — PHYSICAL EXAM
[Restricted in physically strenuous activity but ambulatory and able to carry out work of a light or sedentary nature] : Status 1- Restricted in physically strenuous activity but ambulatory and able to carry out work of a light or sedentary nature, e.g., light house work, office work [General Appearance - Alert] : alert [General Appearance - In No Acute Distress] : in no acute distress [Neck Appearance] : the appearance of the neck was normal [Neck Cervical Mass (___cm)] : no neck mass was observed [Jugular Venous Distention Increased] : there was no jugular-venous distention [Thyroid Diffuse Enlargement] : the thyroid was not enlarged [Thyroid Nodule] : there were no palpable thyroid nodules [] : no respiratory distress [Auscultation Breath Sounds / Voice Sounds] : lungs were clear to auscultation bilaterally [Heart Rate And Rhythm] : heart rate was normal and rhythm regular [Heart Sounds] : normal S1 and S2 [Heart Sounds Gallop] : no gallops [Murmurs] : no murmurs [Heart Sounds Pericardial Friction Rub] : no pericardial rub [Abnormal Walk] : normal gait [Deep Tendon Reflexes (DTR)] : deep tendon reflexes were 2+ and symmetric [Sensation] : the sensory exam was normal to light touch and pinprick [No Focal Deficits] : no focal deficits [Oriented To Time, Place, And Person] : oriented to person, place, and time [Impaired Insight] : insight and judgment were intact [Affect] : the affect was normal

## 2024-05-28 ENCOUNTER — APPOINTMENT (OUTPATIENT)
Dept: PULMONOLOGY | Facility: CLINIC | Age: 69
End: 2024-05-28
Payer: MEDICARE

## 2024-05-28 PROCEDURE — 71046 X-RAY EXAM CHEST 2 VIEWS: CPT

## 2024-05-28 PROCEDURE — 99214 OFFICE O/P EST MOD 30 MIN: CPT | Mod: 25

## 2024-05-29 NOTE — REVIEW OF SYSTEMS
[Feeling Poorly] : feeling poorly [Lower Ext Edema] : lower extremity edema [Cough] : cough [SOB on Exertion] : shortness of breath during exertion [As Noted in HPI] : as noted in HPI [Negative] : Psychiatric [FreeTextEntry9] : low back pain

## 2024-05-29 NOTE — HISTORY OF PRESENT ILLNESS
[TextBox_4] :   patient saw dr. meraz re repair of herniat ct looked great, with slight residual infiltrate,  lungs clear on her last visit rales in the left today and had had an episode of severe reflux while laying down and had a major aspiration event it appears  it would appear that her organizing pneumonia was due to aspiration  come right in throat  horrible buring, chest pain, and throat soreness with delcine in sat

## 2024-05-29 NOTE — SOCIAL HISTORY
[TextEntry] : Lung TB history:  COVID hx/vaccination:   Occupation: employed vs retired; former ?   Patient lives with family/alone/with home aid   Patient denies any major mental health history   Alcohol Consumption:  Recreational Drug use: denies   Restrictions against blood products?

## 2024-05-29 NOTE — DISCUSSION/SUMMARY
[FreeTextEntry1] : discuss with mahnaz, patient and inra the need to operate asap is mandatory she wishes to go to [place] first  i will keep her on low dose prednisone and azithrioprine for now.

## 2024-05-29 NOTE — ASSESSMENT
[FreeTextEntry1] : Patient is a 69 year old female referred by Dr. Pineda for a hiatal hernia. She has had the hiatal hernia with GERD for more than 20 years, not well controlled by lifestyle adjustment nor Dexilant.  She had a recent admission for GI bleed and pneumonia. Her GI bleeding and PNA may all relate to the hiatal hernia.    I reviewed the CT chest completed on 04/29/24 and Upper GI study on 01/02/2024.  Patient has a type III hiatal hernia. It is symptomatic. I explained to the patient that elective surgical repair of the hiatal hernia is indicated given her symptoms and subsequent complications from the hernia. Will evaluate esophageal motility, severity of acid reflux with manometry and Bravo PH study. We will plan on robotic assisted laparoscopic hiatal hernia repair and partial versus Nissen fundoplication based on test results. We will call her to schedule.  Plan: - Manometry - Bravo PH - RTO after above.    I, Dr. Clifford Cervantes MD, personally performed the evaluation and management (E/M) services for this new patient. That E/M includes conducting the clinically appropriate initial history &/or exam, assessing all conditions, and establishing the plan of care. Today, my NP, Lashay Hinson, was here to observe &/or participate in the visit & follow plan of care established by me.

## 2024-05-29 NOTE — CONSULT LETTER
[FreeTextEntry2] : DR. PAVITHRA MARES [FreeTextEntry3] : Clifford Cervantes MD   Attending Thoracic Surgeon  Department of Cardiovascular and Thoracic Surgery   of Cardiothoracic Surgery  [employer] [facility], Division of Thoracic Surgery  [address] [id number]  Tel: [phone]  Fax: [phone]

## 2024-05-29 NOTE — PHYSICAL EXAM
[No Acute Distress] : no acute distress [Normal Oropharynx] : normal oropharynx [Normal Appearance] : normal appearance [No Neck Mass] : no neck mass [Normal Rate/Rhythm] : normal rate/rhythm [Normal S1, S2] : normal s1, s2 [No Murmurs] : no murmurs [No Resp Distress] : no resp distress [Rales] : rales [No Abnormalities] : no abnormalities [Benign] : benign [Normal Gait] : normal gait [No Clubbing] : no clubbing [No Cyanosis] : no cyanosis [FROM] : FROM [Normal Color/ Pigmentation] : normal color/ pigmentation [No Focal Deficits] : no focal deficits [Oriented x3] : oriented x3 [Normal Affect] : normal affect [TextBox_68] : left lungs with rales [TextBox_105] : some leg swelling

## 2024-05-29 NOTE — REVIEW OF SYSTEMS
[Cough] : cough [Sputum] : no sputum [Dyspnea] : dyspnea [GERD] : gerd [Negative] : Hematologic [TextBox_3] : reent bout of aspiration has re ignited the process [TextBox_69] : sever due to major hernia [TextBox_122] : hyper from steroids

## 2024-05-29 NOTE — HISTORY OF PRESENT ILLNESS
[FreeTextEntry1] : Patient is a 69 female, former smoker, with a past medical history of RA, restless leg syndrome on chronic hydrocodone, HTN, goiter, hypothyroidism, obesity, and chronic anemia.  She is referred by DR. PAVITHRA MARES for surgical evaluation of a large type III hiatal hernia.  Patient reports known hiatal hernia with GERD for more than 20 years.  Associated symptoms are acid reflux, substernal burning sensation, and food regurgitation.  Symptoms was not well-controlled with Dexilant and lifestyle adjustment. She had episodes of tarry stool in Nov 2023 and was thought to have upper GI bleeding though bleeding site is still unclear after EGD, colonoscopy, and push enteroscopy.  She was recently admitted to [facility] 3/7-3/11/24 with organizing PNA, on prednisone tapering now.  She admits a bad regurgitation last night which led to low SpO2 level today and should resolve without intervention based on her experience.  She otherwise denies unintentional weight loss, abdominal pain, constipation or diarrhea.   Patient also has an enlarged thyroid with mild dysphagia. Considering ENT evaluation for thyroidectomy.   Recent imaging is as follows;  CT chest /wo IVC 4/29/24: -There has been almost complete resolution of the previously described patchy infiltrates. Although remains a few increased markings on the left which are post inflammatory. -Large type III Hital hernia -Fatty infiltration of the liver  -A linear metallic density in the IVC may represent a stent that did not deploy. Clinically correlate. This is unchanged from prior study.   PFT done on 4/29/24 showed FEV1 = 1.22, 58% of predicted value, FVC =2.21, 80% of predicted value, PEF = 4.55, 85% of predicted value and DLCO =11.56 , 59% of predicted value.  Upper GI study 1/2/24: Very large hiatus hernia of the sliding type with most of the fundus seen within the chest cavity patient is also status post cholecystectomy and spinal surgery in the lower cervical spine and lumbar spine.

## 2024-06-03 ENCOUNTER — APPOINTMENT (OUTPATIENT)
Dept: GASTROENTEROLOGY | Facility: CLINIC | Age: 69
End: 2024-06-03
Payer: MEDICARE

## 2024-06-03 PROCEDURE — 99202 OFFICE O/P NEW SF 15 MIN: CPT

## 2024-06-03 NOTE — HISTORY OF PRESENT ILLNESS
[FreeTextEntry1] : 69 female, former smoker, with a past medical history of RA, restless leg syndrome on chronic hydrocodone, HTN, goiter, hypothyroidism, obesity, and chronic anemia referred by Dr. Cervantes for HRM with 24 hour impedance consult prior to hiatal hernia repair.   HPI- Pt reports she has been aspirating so needs hernia repair. She experiences symptoms of regurgitation. She takes dexlansoprazole 60 mg daily.   Upper GI study 1/2/24: Very large hiatus hernia of the sliding type with most of the fundus seen within the chest cavity patient is also status post cholecystectomy and spinal surgery in the lower cervical spine and lumbar spine.

## 2024-06-03 NOTE — ASSESSMENT
[FreeTextEntry1] : 69 female, former smoker, with a past medical history of RA, restless leg syndrome on chronic hydrocodone, HTN, goiter, hypothyroidism, obesity, and chronic anemia referred by Dr. Cervantes for HRM with 24 hour impedance consult prior to hiatal hernia repair.   Risks including bleeding from nostril, sinus infection, sore throat. I instructed the patient that the study is done off therapy and instructed to stop PPI 7 days prior to the test H2 blocker 3 days prior to test and antacids 24 hours prior to test. Instructed that the NP will be performing the procedure and the study is analyzed by the physician.  Patient to be NPO for at least 8 hours prior to the test. Patient instructed to avoid taking any prokinetic meds, muscle relaxants, opiate pain meds, Valium, Xanax, Ativan,etc.  Patient also instructed that she would bring the recorder back to the  at endo suite once the study is completed.  All questions were answered. The patient expressed understanding of these risks and is agreeable to proceed.  The  to confirm appointment with patient.

## 2024-06-18 ENCOUNTER — NON-APPOINTMENT (OUTPATIENT)
Age: 69
End: 2024-06-18

## 2024-06-20 ENCOUNTER — APPOINTMENT (OUTPATIENT)
Dept: GASTROENTEROLOGY | Facility: HOSPITAL | Age: 69
End: 2024-06-20

## 2024-06-25 ENCOUNTER — APPOINTMENT (OUTPATIENT)
Dept: PULMONOLOGY | Facility: CLINIC | Age: 69
End: 2024-06-25
Payer: MEDICARE

## 2024-06-25 VITALS
BODY MASS INDEX: 37.21 KG/M2 | SYSTOLIC BLOOD PRESSURE: 120 MMHG | OXYGEN SATURATION: 96 % | HEIGHT: 63 IN | HEART RATE: 100 BPM | DIASTOLIC BLOOD PRESSURE: 77 MMHG | WEIGHT: 210 LBS

## 2024-06-25 PROCEDURE — 94010 BREATHING CAPACITY TEST: CPT

## 2024-06-25 PROCEDURE — 99214 OFFICE O/P EST MOD 30 MIN: CPT | Mod: 25

## 2024-06-27 ENCOUNTER — APPOINTMENT (OUTPATIENT)
Dept: THORACIC SURGERY | Facility: CLINIC | Age: 69
End: 2024-06-27

## 2024-06-27 ENCOUNTER — APPOINTMENT (OUTPATIENT)
Dept: THORACIC SURGERY | Facility: CLINIC | Age: 69
End: 2024-06-27
Payer: MEDICARE

## 2024-06-27 ENCOUNTER — INPATIENT (INPATIENT)
Facility: HOSPITAL | Age: 69
LOS: 5 days | Discharge: HOME HEALTH | DRG: 853 | End: 2024-07-03
Attending: THORACIC SURGERY (CARDIOTHORACIC VASCULAR SURGERY) | Admitting: INTERNAL MEDICINE
Payer: MEDICARE

## 2024-06-27 VITALS
OXYGEN SATURATION: 87 % | HEART RATE: 105 BPM | SYSTOLIC BLOOD PRESSURE: 129 MMHG | DIASTOLIC BLOOD PRESSURE: 83 MMHG | TEMPERATURE: 98 F | RESPIRATION RATE: 18 BRPM | WEIGHT: 205.91 LBS

## 2024-06-27 VITALS
DIASTOLIC BLOOD PRESSURE: 64 MMHG | RESPIRATION RATE: 17 BRPM | BODY MASS INDEX: 37.03 KG/M2 | HEART RATE: 106 BPM | OXYGEN SATURATION: 90 % | HEIGHT: 63 IN | TEMPERATURE: 97.4 F | WEIGHT: 209 LBS | SYSTOLIC BLOOD PRESSURE: 125 MMHG

## 2024-06-27 DIAGNOSIS — K44.9 DIAPHRAGMATIC HERNIA WITHOUT OBSTRUCTION OR GANGRENE: ICD-10-CM

## 2024-06-27 DIAGNOSIS — G25.81 RESTLESS LEGS SYNDROME: ICD-10-CM

## 2024-06-27 DIAGNOSIS — R65.20 SEVERE SEPSIS WITHOUT SEPTIC SHOCK: ICD-10-CM

## 2024-06-27 DIAGNOSIS — A41.9 SEPSIS, UNSPECIFIED ORGANISM: ICD-10-CM

## 2024-06-27 DIAGNOSIS — K44.9 DIAPHRAGMATIC HERNIA W/OUT OBSTRUCTION OR GANGRENE: ICD-10-CM

## 2024-06-27 DIAGNOSIS — Z79.01 LONG TERM (CURRENT) USE OF ANTICOAGULANTS: ICD-10-CM

## 2024-06-27 DIAGNOSIS — Z29.9 ENCOUNTER FOR PROPHYLACTIC MEASURES, UNSPECIFIED: ICD-10-CM

## 2024-06-27 DIAGNOSIS — E03.9 HYPOTHYROIDISM, UNSPECIFIED: ICD-10-CM

## 2024-06-27 DIAGNOSIS — I48.0 PAROXYSMAL ATRIAL FIBRILLATION: ICD-10-CM

## 2024-06-27 DIAGNOSIS — J69.0 PNEUMONITIS DUE TO INHALATION OF FOOD AND VOMIT: ICD-10-CM

## 2024-06-27 DIAGNOSIS — M79.89 OTHER SPECIFIED SOFT TISSUE DISORDERS: ICD-10-CM

## 2024-06-27 DIAGNOSIS — K21.9 GASTRO-ESOPHAGEAL REFLUX DISEASE W/OUT ESOPHAGITIS: ICD-10-CM

## 2024-06-27 DIAGNOSIS — D64.9 ANEMIA, UNSPECIFIED: ICD-10-CM

## 2024-06-27 DIAGNOSIS — M06.9 RHEUMATOID ARTHRITIS, UNSPECIFIED: ICD-10-CM

## 2024-06-27 DIAGNOSIS — I10 ESSENTIAL (PRIMARY) HYPERTENSION: ICD-10-CM

## 2024-06-27 DIAGNOSIS — Z79.890 HORMONE REPLACEMENT THERAPY: ICD-10-CM

## 2024-06-27 DIAGNOSIS — I25.84 CORONARY ATHEROSCLEROSIS DUE TO CALCIFIED CORONARY LESION: ICD-10-CM

## 2024-06-27 DIAGNOSIS — J96.01 ACUTE RESPIRATORY FAILURE WITH HYPOXIA: ICD-10-CM

## 2024-06-27 DIAGNOSIS — K21.9 GASTRO-ESOPHAGEAL REFLUX DISEASE WITHOUT ESOPHAGITIS: ICD-10-CM

## 2024-06-27 DIAGNOSIS — I25.10 ATHEROSCLEROTIC HEART DISEASE OF NATIVE CORONARY ARTERY WITHOUT ANGINA PECTORIS: ICD-10-CM

## 2024-06-27 LAB
ADD ON TEST-SPECIMEN IN LAB: SIGNIFICANT CHANGE UP
ADD ON TEST-SPECIMEN IN LAB: SIGNIFICANT CHANGE UP
ANION GAP SERPL CALC-SCNC: 12 MMOL/L — SIGNIFICANT CHANGE UP (ref 5–17)
ANISOCYTOSIS BLD QL: SLIGHT — SIGNIFICANT CHANGE UP
APPEARANCE UR: ABNORMAL
BACTERIA # UR AUTO: ABNORMAL /HPF
BASOPHILS # BLD AUTO: 0 K/UL — SIGNIFICANT CHANGE UP (ref 0–0.2)
BASOPHILS NFR BLD AUTO: 0 % — SIGNIFICANT CHANGE UP (ref 0–2)
BILIRUB UR-MCNC: NEGATIVE — SIGNIFICANT CHANGE UP
BUN SERPL-MCNC: 13 MG/DL — SIGNIFICANT CHANGE UP (ref 7–23)
CALCIUM SERPL-MCNC: 9 MG/DL — SIGNIFICANT CHANGE UP (ref 8.4–10.5)
CAST: 0 /LPF — SIGNIFICANT CHANGE UP (ref 0–4)
CHLORIDE SERPL-SCNC: 98 MMOL/L — SIGNIFICANT CHANGE UP (ref 96–108)
CO2 SERPL-SCNC: 29 MMOL/L — SIGNIFICANT CHANGE UP (ref 22–31)
COLOR SPEC: YELLOW — SIGNIFICANT CHANGE UP
CREAT SERPL-MCNC: 0.8 MG/DL — SIGNIFICANT CHANGE UP (ref 0.5–1.3)
DIFF PNL FLD: NEGATIVE — SIGNIFICANT CHANGE UP
EGFR: 79 ML/MIN/1.73M2 — SIGNIFICANT CHANGE UP
EOSINOPHIL # BLD AUTO: 0 K/UL — SIGNIFICANT CHANGE UP (ref 0–0.5)
EOSINOPHIL NFR BLD AUTO: 0 % — SIGNIFICANT CHANGE UP (ref 0–6)
GLUCOSE SERPL-MCNC: 128 MG/DL — HIGH (ref 70–99)
GLUCOSE UR QL: NEGATIVE MG/DL — SIGNIFICANT CHANGE UP
HAPTOGLOB SERPL-MCNC: 255 MG/DL — HIGH (ref 34–200)
HCT VFR BLD CALC: 33.1 % — LOW (ref 34.5–45)
HGB BLD-MCNC: 10.5 G/DL — LOW (ref 11.5–15.5)
HYPOCHROMIA BLD QL: SLIGHT — SIGNIFICANT CHANGE UP
KETONES UR-MCNC: NEGATIVE MG/DL — SIGNIFICANT CHANGE UP
LACTATE SERPL-SCNC: 2 MMOL/L — SIGNIFICANT CHANGE UP (ref 0.5–2)
LDH SERPL L TO P-CCNC: 339 U/L — HIGH (ref 50–242)
LEUKOCYTE ESTERASE UR-ACNC: ABNORMAL
LYMPHOCYTES # BLD AUTO: 0.96 K/UL — LOW (ref 1–3.3)
LYMPHOCYTES # BLD AUTO: 4.4 % — LOW (ref 13–44)
MACROCYTES BLD QL: SLIGHT — SIGNIFICANT CHANGE UP
MANUAL SMEAR VERIFICATION: SIGNIFICANT CHANGE UP
MCHC RBC-ENTMCNC: 29.2 PG — SIGNIFICANT CHANGE UP (ref 27–34)
MCHC RBC-ENTMCNC: 31.7 GM/DL — LOW (ref 32–36)
MCV RBC AUTO: 91.9 FL — SIGNIFICANT CHANGE UP (ref 80–100)
MICROCYTES BLD QL: SLIGHT — SIGNIFICANT CHANGE UP
MONOCYTES # BLD AUTO: 0.2 K/UL — SIGNIFICANT CHANGE UP (ref 0–0.9)
MONOCYTES NFR BLD AUTO: 0.9 % — LOW (ref 2–14)
NEUTROPHILS # BLD AUTO: 20.39 K/UL — HIGH (ref 1.8–7.4)
NEUTROPHILS NFR BLD AUTO: 93.8 % — HIGH (ref 43–77)
NITRITE UR-MCNC: NEGATIVE — SIGNIFICANT CHANGE UP
OVALOCYTES BLD QL SMEAR: SLIGHT — SIGNIFICANT CHANGE UP
PH UR: 6 — SIGNIFICANT CHANGE UP (ref 5–8)
PLAT MORPH BLD: ABNORMAL
PLATELET # BLD AUTO: 466 K/UL — HIGH (ref 150–400)
POLYCHROMASIA BLD QL SMEAR: SLIGHT — SIGNIFICANT CHANGE UP
POTASSIUM SERPL-MCNC: 4 MMOL/L — SIGNIFICANT CHANGE UP (ref 3.5–5.3)
POTASSIUM SERPL-SCNC: 4 MMOL/L — SIGNIFICANT CHANGE UP (ref 3.5–5.3)
PROT UR-MCNC: NEGATIVE MG/DL — SIGNIFICANT CHANGE UP
RBC # BLD: 3.6 M/UL — LOW (ref 3.8–5.2)
RBC # FLD: 15.3 % — HIGH (ref 10.3–14.5)
RBC BLD AUTO: ABNORMAL
RBC CASTS # UR COMP ASSIST: 3 /HPF — SIGNIFICANT CHANGE UP (ref 0–4)
SODIUM SERPL-SCNC: 139 MMOL/L — SIGNIFICANT CHANGE UP (ref 135–145)
SP GR SPEC: 1.01 — SIGNIFICANT CHANGE UP (ref 1–1.03)
SPHEROCYTES BLD QL SMEAR: SLIGHT — SIGNIFICANT CHANGE UP
SQUAMOUS # UR AUTO: 26 /HPF — HIGH (ref 0–5)
UROBILINOGEN FLD QL: 0.2 MG/DL — SIGNIFICANT CHANGE UP (ref 0.2–1)
VARIANT LYMPHS # BLD: 0.9 % — SIGNIFICANT CHANGE UP (ref 0–6)
WBC # BLD: 21.74 K/UL — HIGH (ref 3.8–10.5)
WBC # FLD AUTO: 21.74 K/UL — HIGH (ref 3.8–10.5)
WBC UR QL: 15 /HPF — HIGH (ref 0–5)

## 2024-06-27 PROCEDURE — 99214 OFFICE O/P EST MOD 30 MIN: CPT

## 2024-06-27 PROCEDURE — 71045 X-RAY EXAM CHEST 1 VIEW: CPT | Mod: 26

## 2024-06-27 PROCEDURE — 99285 EMERGENCY DEPT VISIT HI MDM: CPT | Mod: FS

## 2024-06-27 PROCEDURE — 71275 CT ANGIOGRAPHY CHEST: CPT | Mod: 26,MC

## 2024-06-27 PROCEDURE — 93970 EXTREMITY STUDY: CPT | Mod: 26

## 2024-06-27 RX ORDER — PANTOPRAZOLE SODIUM 40 MG/10ML
40 INJECTION, POWDER, FOR SOLUTION INTRAVENOUS
Refills: 0 | Status: DISCONTINUED | OUTPATIENT
Start: 2024-06-27 | End: 2024-07-02

## 2024-06-27 RX ORDER — PIPERACILLIN SODIUM AND TAZOBACTAM SODIUM 3; .375 G/15ML; G/15ML
3.38 INJECTION, POWDER, LYOPHILIZED, FOR SOLUTION INTRAVENOUS EVERY 8 HOURS
Refills: 0 | Status: DISCONTINUED | OUTPATIENT
Start: 2024-06-27 | End: 2024-07-02

## 2024-06-27 RX ORDER — OXYCODONE AND ACETAMINOPHEN 5; 325 MG/1; MG/1
3 TABLET ORAL EVERY 8 HOURS
Refills: 0 | Status: DISCONTINUED | OUTPATIENT
Start: 2024-06-27 | End: 2024-06-27

## 2024-06-27 RX ORDER — ACETAMINOPHEN 325 MG
650 TABLET ORAL EVERY 6 HOURS
Refills: 0 | Status: DISCONTINUED | OUTPATIENT
Start: 2024-06-27 | End: 2024-07-02

## 2024-06-27 RX ORDER — LOSARTAN POTASSIUM 100 MG/1
100 TABLET, FILM COATED ORAL DAILY
Refills: 0 | Status: DISCONTINUED | OUTPATIENT
Start: 2024-06-27 | End: 2024-06-30

## 2024-06-27 RX ORDER — IPRATROPIUM BROMIDE AND ALBUTEROL SULFATE .5; 3 MG/3ML; MG/3ML
3 SOLUTION RESPIRATORY (INHALATION) EVERY 6 HOURS
Refills: 0 | Status: DISCONTINUED | OUTPATIENT
Start: 2024-06-27 | End: 2024-07-02

## 2024-06-27 RX ORDER — AMITRIPTYLINE HCL 10 MG
100 TABLET ORAL DAILY
Refills: 0 | Status: DISCONTINUED | OUTPATIENT
Start: 2024-06-27 | End: 2024-07-02

## 2024-06-27 RX ORDER — LEVOTHYROXINE SODIUM 25 MCG
50 TABLET ORAL DAILY
Refills: 0 | Status: DISCONTINUED | OUTPATIENT
Start: 2024-06-27 | End: 2024-07-02

## 2024-06-27 RX ORDER — ONDANSETRON HYDROCHLORIDE 2 MG/ML
4 INJECTION INTRAMUSCULAR; INTRAVENOUS EVERY 8 HOURS
Refills: 0 | Status: DISCONTINUED | OUTPATIENT
Start: 2024-06-27 | End: 2024-07-02

## 2024-06-27 RX ORDER — LOSARTAN POTASSIUM 100 MG/1
1 TABLET, FILM COATED ORAL
Refills: 0 | DISCHARGE

## 2024-06-27 RX ORDER — MAGNESIUM, ALUMINUM HYDROXIDE 400-400
30 TABLET,CHEWABLE ORAL EVERY 4 HOURS
Refills: 0 | Status: DISCONTINUED | OUTPATIENT
Start: 2024-06-27 | End: 2024-06-29

## 2024-06-27 RX ORDER — HYDROCHLOROTHIAZIDE 25 MG
1 TABLET ORAL
Refills: 0 | DISCHARGE

## 2024-06-27 RX ORDER — METOPROLOL TARTRATE 50 MG
50 TABLET ORAL DAILY
Refills: 0 | Status: DISCONTINUED | OUTPATIENT
Start: 2024-06-27 | End: 2024-07-02

## 2024-06-27 RX ORDER — LEVOTHYROXINE SODIUM 125 MCG
1 TABLET ORAL
Refills: 0 | DISCHARGE

## 2024-06-27 RX ORDER — DEXLANSOPRAZOLE 30 MG/1
1 CAPSULE, DELAYED RELEASE ORAL
Refills: 0 | DISCHARGE

## 2024-06-27 RX ORDER — PREDNISONE 10 MG/1
5 TABLET ORAL EVERY 12 HOURS
Refills: 0 | Status: DISCONTINUED | OUTPATIENT
Start: 2024-06-27 | End: 2024-07-02

## 2024-06-27 RX ORDER — IPRATROPIUM BROMIDE AND ALBUTEROL SULFATE .5; 3 MG/3ML; MG/3ML
3 SOLUTION RESPIRATORY (INHALATION) ONCE
Refills: 0 | Status: COMPLETED | OUTPATIENT
Start: 2024-06-27 | End: 2024-06-27

## 2024-06-27 RX ORDER — ENOXAPARIN SODIUM 100 MG/ML
40 INJECTION SUBCUTANEOUS EVERY 24 HOURS
Refills: 0 | Status: DISCONTINUED | OUTPATIENT
Start: 2024-06-27 | End: 2024-06-30

## 2024-06-27 RX ORDER — ROPINIROLE HYDROCHLORIDE 0.5 MG/1
2 TABLET, FILM COATED ORAL AT BEDTIME
Refills: 0 | Status: DISCONTINUED | OUTPATIENT
Start: 2024-06-27 | End: 2024-06-28

## 2024-06-27 RX ORDER — PREDNISONE 10 MG/1
10 TABLET ORAL EVERY 12 HOURS
Refills: 0 | Status: DISCONTINUED | OUTPATIENT
Start: 2024-06-27 | End: 2024-06-27

## 2024-06-27 RX ORDER — FUROSEMIDE 10 MG/ML
40 INJECTION, SOLUTION INTRAMUSCULAR; INTRAVENOUS EVERY 24 HOURS
Refills: 0 | Status: DISCONTINUED | OUTPATIENT
Start: 2024-06-28 | End: 2024-06-28

## 2024-06-27 RX ORDER — AMITRIPTYLINE HCL 25 MG
1 TABLET ORAL
Refills: 0 | DISCHARGE

## 2024-06-27 RX ORDER — GABAPENTIN
600 POWDER (GRAM) MISCELLANEOUS THREE TIMES A DAY
Refills: 0 | Status: DISCONTINUED | OUTPATIENT
Start: 2024-06-27 | End: 2024-06-28

## 2024-06-27 RX ORDER — FUROSEMIDE 10 MG/ML
40 INJECTION, SOLUTION INTRAMUSCULAR; INTRAVENOUS ONCE
Refills: 0 | Status: COMPLETED | OUTPATIENT
Start: 2024-06-27 | End: 2024-06-27

## 2024-06-27 RX ORDER — SODIUM CHLORIDE 0.9 % (FLUSH) 0.9 %
1000 SYRINGE (ML) INJECTION ONCE
Refills: 0 | Status: COMPLETED | OUTPATIENT
Start: 2024-06-27 | End: 2024-06-27

## 2024-06-27 RX ORDER — PIPERACILLIN SODIUM AND TAZOBACTAM SODIUM 3; .375 G/15ML; G/15ML
3.38 INJECTION, POWDER, LYOPHILIZED, FOR SOLUTION INTRAVENOUS ONCE
Refills: 0 | Status: COMPLETED | OUTPATIENT
Start: 2024-06-27 | End: 2024-06-27

## 2024-06-27 RX ADMIN — FUROSEMIDE 40 MILLIGRAM(S): 10 INJECTION, SOLUTION INTRAMUSCULAR; INTRAVENOUS at 19:42

## 2024-06-27 RX ADMIN — IPRATROPIUM BROMIDE AND ALBUTEROL SULFATE 3 MILLILITER(S): .5; 3 SOLUTION RESPIRATORY (INHALATION) at 14:01

## 2024-06-27 RX ADMIN — ROPINIROLE HYDROCHLORIDE 2 MILLIGRAM(S): 0.5 TABLET, FILM COATED ORAL at 23:33

## 2024-06-27 RX ADMIN — PIPERACILLIN SODIUM AND TAZOBACTAM SODIUM 200 GRAM(S): 3; .375 INJECTION, POWDER, LYOPHILIZED, FOR SOLUTION INTRAVENOUS at 14:58

## 2024-06-27 RX ADMIN — Medication 600 MILLIGRAM(S): at 23:34

## 2024-06-27 RX ADMIN — PIPERACILLIN SODIUM AND TAZOBACTAM SODIUM 25 GRAM(S): 3; .375 INJECTION, POWDER, LYOPHILIZED, FOR SOLUTION INTRAVENOUS at 23:35

## 2024-06-27 RX ADMIN — ENOXAPARIN SODIUM 40 MILLIGRAM(S): 100 INJECTION SUBCUTANEOUS at 23:34

## 2024-06-27 RX ADMIN — PREDNISONE 5 MILLIGRAM(S): 10 TABLET ORAL at 23:35

## 2024-06-27 RX ADMIN — Medication 1000 MILLILITER(S): at 15:30

## 2024-06-28 ENCOUNTER — RESULT REVIEW (OUTPATIENT)
Age: 69
End: 2024-06-28

## 2024-06-28 LAB
ANION GAP SERPL CALC-SCNC: 10 MMOL/L — SIGNIFICANT CHANGE UP (ref 5–17)
APTT BLD: 27.8 SEC — SIGNIFICANT CHANGE UP (ref 24.5–35.6)
BASOPHILS # BLD AUTO: 0.04 K/UL — SIGNIFICANT CHANGE UP (ref 0–0.2)
BASOPHILS NFR BLD AUTO: 0.3 % — SIGNIFICANT CHANGE UP (ref 0–2)
BLD GP AB SCN SERPL QL: NEGATIVE — SIGNIFICANT CHANGE UP
BUN SERPL-MCNC: 18 MG/DL — SIGNIFICANT CHANGE UP (ref 7–23)
CALCIUM SERPL-MCNC: 8.8 MG/DL — SIGNIFICANT CHANGE UP (ref 8.4–10.5)
CHLORIDE SERPL-SCNC: 102 MMOL/L — SIGNIFICANT CHANGE UP (ref 96–108)
CO2 SERPL-SCNC: 29 MMOL/L — SIGNIFICANT CHANGE UP (ref 22–31)
CREAT SERPL-MCNC: 0.73 MG/DL — SIGNIFICANT CHANGE UP (ref 0.5–1.3)
EGFR: 88 ML/MIN/1.73M2 — SIGNIFICANT CHANGE UP
EOSINOPHIL # BLD AUTO: 0.28 K/UL — SIGNIFICANT CHANGE UP (ref 0–0.5)
EOSINOPHIL NFR BLD AUTO: 2.3 % — SIGNIFICANT CHANGE UP (ref 0–6)
FERRITIN SERPL-MCNC: 44 NG/ML — SIGNIFICANT CHANGE UP (ref 13–330)
GLUCOSE SERPL-MCNC: 115 MG/DL — HIGH (ref 70–99)
HCT VFR BLD CALC: 31.4 % — LOW (ref 34.5–45)
HGB BLD-MCNC: 9.7 G/DL — LOW (ref 11.5–15.5)
IMM GRANULOCYTES NFR BLD AUTO: 0.4 % — SIGNIFICANT CHANGE UP (ref 0–0.9)
INR BLD: 1 — SIGNIFICANT CHANGE UP (ref 0.85–1.18)
IRON SATN MFR SERPL: 19 UG/DL — LOW (ref 30–160)
IRON SATN MFR SERPL: SIGNIFICANT CHANGE UP % (ref 14–50)
LYMPHOCYTES # BLD AUTO: 1.12 K/UL — SIGNIFICANT CHANGE UP (ref 1–3.3)
LYMPHOCYTES # BLD AUTO: 9.2 % — LOW (ref 13–44)
MAGNESIUM SERPL-MCNC: 2.1 MG/DL — SIGNIFICANT CHANGE UP (ref 1.6–2.6)
MCHC RBC-ENTMCNC: 29.2 PG — SIGNIFICANT CHANGE UP (ref 27–34)
MCHC RBC-ENTMCNC: 30.9 GM/DL — LOW (ref 32–36)
MCV RBC AUTO: 94.6 FL — SIGNIFICANT CHANGE UP (ref 80–100)
MONOCYTES # BLD AUTO: 0.72 K/UL — SIGNIFICANT CHANGE UP (ref 0–0.9)
MONOCYTES NFR BLD AUTO: 5.9 % — SIGNIFICANT CHANGE UP (ref 2–14)
NEUTROPHILS # BLD AUTO: 9.91 K/UL — HIGH (ref 1.8–7.4)
NEUTROPHILS NFR BLD AUTO: 81.9 % — HIGH (ref 43–77)
NRBC # BLD: 0 /100 WBCS — SIGNIFICANT CHANGE UP (ref 0–0)
PHOSPHATE SERPL-MCNC: 3.9 MG/DL — SIGNIFICANT CHANGE UP (ref 2.5–4.5)
PLATELET # BLD AUTO: 397 K/UL — SIGNIFICANT CHANGE UP (ref 150–400)
POTASSIUM SERPL-MCNC: 3.8 MMOL/L — SIGNIFICANT CHANGE UP (ref 3.5–5.3)
POTASSIUM SERPL-SCNC: 3.8 MMOL/L — SIGNIFICANT CHANGE UP (ref 3.5–5.3)
PROTHROM AB SERPL-ACNC: 11.4 SEC — SIGNIFICANT CHANGE UP (ref 9.5–13)
RBC # BLD: 3.32 M/UL — LOW (ref 3.8–5.2)
RBC # FLD: 15.4 % — HIGH (ref 10.3–14.5)
RH IG SCN BLD-IMP: POSITIVE — SIGNIFICANT CHANGE UP
SODIUM SERPL-SCNC: 141 MMOL/L — SIGNIFICANT CHANGE UP (ref 135–145)
TIBC SERPL-MCNC: SIGNIFICANT CHANGE UP UG/DL (ref 220–430)
UIBC SERPL-MCNC: SIGNIFICANT CHANGE UP UG/DL (ref 110–370)
WBC # BLD: 12.12 K/UL — HIGH (ref 3.8–10.5)
WBC # FLD AUTO: 12.12 K/UL — HIGH (ref 3.8–10.5)

## 2024-06-28 PROCEDURE — 99221 1ST HOSP IP/OBS SF/LOW 40: CPT

## 2024-06-28 PROCEDURE — 93306 TTE W/DOPPLER COMPLETE: CPT | Mod: 26

## 2024-06-28 RX ORDER — GABAPENTIN
1200 POWDER (GRAM) MISCELLANEOUS AT BEDTIME
Refills: 0 | Status: DISCONTINUED | OUTPATIENT
Start: 2024-06-29 | End: 2024-06-29

## 2024-06-28 RX ORDER — GABAPENTIN
600 POWDER (GRAM) MISCELLANEOUS ONCE
Refills: 0 | Status: COMPLETED | OUTPATIENT
Start: 2024-06-28 | End: 2024-06-28

## 2024-06-28 RX ORDER — MAGNESIUM, ALUMINUM HYDROXIDE 400-400
30 TABLET,CHEWABLE ORAL ONCE
Refills: 0 | Status: COMPLETED | OUTPATIENT
Start: 2024-06-28 | End: 2024-06-29

## 2024-06-28 RX ORDER — ATORVASTATIN CALCIUM 20 MG/1
40 TABLET, FILM COATED ORAL AT BEDTIME
Refills: 0 | Status: DISCONTINUED | OUTPATIENT
Start: 2024-06-28 | End: 2024-07-02

## 2024-06-28 RX ORDER — ROPINIROLE HYDROCHLORIDE 0.5 MG/1
1 TABLET, FILM COATED ORAL AT BEDTIME
Refills: 0 | Status: DISCONTINUED | OUTPATIENT
Start: 2024-06-29 | End: 2024-06-29

## 2024-06-28 RX ORDER — PANTOPRAZOLE SODIUM 40 MG/10ML
40 INJECTION, POWDER, FOR SOLUTION INTRAVENOUS ONCE
Refills: 0 | Status: COMPLETED | OUTPATIENT
Start: 2024-06-28 | End: 2024-06-28

## 2024-06-28 RX ORDER — MAGNESIUM, ALUMINUM HYDROXIDE 400-400
30 TABLET,CHEWABLE ORAL EVERY 4 HOURS
Refills: 0 | Status: DISCONTINUED | OUTPATIENT
Start: 2024-06-28 | End: 2024-07-02

## 2024-06-28 RX ADMIN — PIPERACILLIN SODIUM AND TAZOBACTAM SODIUM 25 GRAM(S): 3; .375 INJECTION, POWDER, LYOPHILIZED, FOR SOLUTION INTRAVENOUS at 14:42

## 2024-06-28 RX ADMIN — Medication 600 MILLIGRAM(S): at 06:36

## 2024-06-28 RX ADMIN — Medication 600 MILLIGRAM(S): at 21:35

## 2024-06-28 RX ADMIN — ENOXAPARIN SODIUM 40 MILLIGRAM(S): 100 INJECTION SUBCUTANEOUS at 19:15

## 2024-06-28 RX ADMIN — Medication 600 MILLIGRAM(S): at 21:04

## 2024-06-28 RX ADMIN — Medication 50 MILLIGRAM(S): at 06:36

## 2024-06-28 RX ADMIN — Medication 50 MICROGRAM(S): at 06:36

## 2024-06-28 RX ADMIN — PIPERACILLIN SODIUM AND TAZOBACTAM SODIUM 25 GRAM(S): 3; .375 INJECTION, POWDER, LYOPHILIZED, FOR SOLUTION INTRAVENOUS at 08:23

## 2024-06-28 RX ADMIN — Medication 600 MILLIGRAM(S): at 13:01

## 2024-06-28 RX ADMIN — ROPINIROLE HYDROCHLORIDE 2 MILLIGRAM(S): 0.5 TABLET, FILM COATED ORAL at 21:04

## 2024-06-28 RX ADMIN — PREDNISONE 5 MILLIGRAM(S): 10 TABLET ORAL at 06:38

## 2024-06-28 RX ADMIN — PREDNISONE 5 MILLIGRAM(S): 10 TABLET ORAL at 17:46

## 2024-06-28 RX ADMIN — LOSARTAN POTASSIUM 100 MILLIGRAM(S): 100 TABLET, FILM COATED ORAL at 09:24

## 2024-06-28 RX ADMIN — PIPERACILLIN SODIUM AND TAZOBACTAM SODIUM 25 GRAM(S): 3; .375 INJECTION, POWDER, LYOPHILIZED, FOR SOLUTION INTRAVENOUS at 21:05

## 2024-06-28 RX ADMIN — Medication 100 MILLIGRAM(S): at 11:02

## 2024-06-28 RX ADMIN — PANTOPRAZOLE SODIUM 40 MILLIGRAM(S): 40 INJECTION, POWDER, FOR SOLUTION INTRAVENOUS at 06:36

## 2024-06-29 DIAGNOSIS — I25.10 ATHEROSCLEROTIC HEART DISEASE OF NATIVE CORONARY ARTERY WITHOUT ANGINA PECTORIS: ICD-10-CM

## 2024-06-29 LAB
A1C WITH ESTIMATED AVERAGE GLUCOSE RESULT: 5.3 % — SIGNIFICANT CHANGE UP (ref 4–5.6)
ALBUMIN SERPL ELPH-MCNC: 3.3 G/DL — SIGNIFICANT CHANGE UP (ref 3.3–5)
ALP SERPL-CCNC: 80 U/L — SIGNIFICANT CHANGE UP (ref 40–120)
ALT FLD-CCNC: 11 U/L — SIGNIFICANT CHANGE UP (ref 10–45)
ANION GAP SERPL CALC-SCNC: 9 MMOL/L — SIGNIFICANT CHANGE UP (ref 5–17)
AST SERPL-CCNC: 9 U/L — LOW (ref 10–40)
BASOPHILS # BLD AUTO: 0.02 K/UL — SIGNIFICANT CHANGE UP (ref 0–0.2)
BASOPHILS NFR BLD AUTO: 0.2 % — SIGNIFICANT CHANGE UP (ref 0–2)
BILIRUB SERPL-MCNC: 0.2 MG/DL — SIGNIFICANT CHANGE UP (ref 0.2–1.2)
BUN SERPL-MCNC: 19 MG/DL — SIGNIFICANT CHANGE UP (ref 7–23)
CALCIUM SERPL-MCNC: 8.5 MG/DL — SIGNIFICANT CHANGE UP (ref 8.4–10.5)
CHLORIDE SERPL-SCNC: 102 MMOL/L — SIGNIFICANT CHANGE UP (ref 96–108)
CHOLEST SERPL-MCNC: 192 MG/DL — SIGNIFICANT CHANGE UP
CO2 SERPL-SCNC: 27 MMOL/L — SIGNIFICANT CHANGE UP (ref 22–31)
CREAT SERPL-MCNC: 0.77 MG/DL — SIGNIFICANT CHANGE UP (ref 0.5–1.3)
CULTURE RESULTS: SIGNIFICANT CHANGE UP
EGFR: 83 ML/MIN/1.73M2 — SIGNIFICANT CHANGE UP
EOSINOPHIL # BLD AUTO: 0.17 K/UL — SIGNIFICANT CHANGE UP (ref 0–0.5)
EOSINOPHIL NFR BLD AUTO: 1.7 % — SIGNIFICANT CHANGE UP (ref 0–6)
ESTIMATED AVERAGE GLUCOSE: 105 MG/DL — SIGNIFICANT CHANGE UP (ref 68–114)
GLUCOSE SERPL-MCNC: 158 MG/DL — HIGH (ref 70–99)
HCT VFR BLD CALC: 30.7 % — LOW (ref 34.5–45)
HDLC SERPL-MCNC: 70 MG/DL — SIGNIFICANT CHANGE UP
HGB BLD-MCNC: 9.2 G/DL — LOW (ref 11.5–15.5)
IMM GRANULOCYTES NFR BLD AUTO: 0.5 % — SIGNIFICANT CHANGE UP (ref 0–0.9)
LIPID PNL WITH DIRECT LDL SERPL: 102 MG/DL — HIGH
LYMPHOCYTES # BLD AUTO: 0.88 K/UL — LOW (ref 1–3.3)
LYMPHOCYTES # BLD AUTO: 8.8 % — LOW (ref 13–44)
MAGNESIUM SERPL-MCNC: 2.1 MG/DL — SIGNIFICANT CHANGE UP (ref 1.6–2.6)
MCHC RBC-ENTMCNC: 29.2 PG — SIGNIFICANT CHANGE UP (ref 27–34)
MCHC RBC-ENTMCNC: 30 GM/DL — LOW (ref 32–36)
MCV RBC AUTO: 97.5 FL — SIGNIFICANT CHANGE UP (ref 80–100)
MONOCYTES # BLD AUTO: 0.35 K/UL — SIGNIFICANT CHANGE UP (ref 0–0.9)
MONOCYTES NFR BLD AUTO: 3.5 % — SIGNIFICANT CHANGE UP (ref 2–14)
NEUTROPHILS # BLD AUTO: 8.57 K/UL — HIGH (ref 1.8–7.4)
NEUTROPHILS NFR BLD AUTO: 85.3 % — HIGH (ref 43–77)
NON HDL CHOLESTEROL: 122 MG/DL — SIGNIFICANT CHANGE UP
NRBC # BLD: 0 /100 WBCS — SIGNIFICANT CHANGE UP (ref 0–0)
PHOSPHATE SERPL-MCNC: 2.3 MG/DL — LOW (ref 2.5–4.5)
PLATELET # BLD AUTO: 428 K/UL — HIGH (ref 150–400)
POTASSIUM SERPL-MCNC: 4 MMOL/L — SIGNIFICANT CHANGE UP (ref 3.5–5.3)
POTASSIUM SERPL-SCNC: 4 MMOL/L — SIGNIFICANT CHANGE UP (ref 3.5–5.3)
PROT SERPL-MCNC: 6.4 G/DL — SIGNIFICANT CHANGE UP (ref 6–8.3)
RBC # BLD: 3.15 M/UL — LOW (ref 3.8–5.2)
RBC # FLD: 15.4 % — HIGH (ref 10.3–14.5)
SODIUM SERPL-SCNC: 138 MMOL/L — SIGNIFICANT CHANGE UP (ref 135–145)
SPECIMEN SOURCE: SIGNIFICANT CHANGE UP
TRIGL SERPL-MCNC: 114 MG/DL — SIGNIFICANT CHANGE UP
TSH SERPL-MCNC: 0.23 UIU/ML — LOW (ref 0.27–4.2)
WBC # BLD: 10.04 K/UL — SIGNIFICANT CHANGE UP (ref 3.8–10.5)
WBC # FLD AUTO: 10.04 K/UL — SIGNIFICANT CHANGE UP (ref 3.8–10.5)

## 2024-06-29 RX ORDER — ROPINIROLE HYDROCHLORIDE 0.5 MG/1
1 TABLET, FILM COATED ORAL DAILY
Refills: 0 | Status: DISCONTINUED | OUTPATIENT
Start: 2024-06-29 | End: 2024-07-01

## 2024-06-29 RX ORDER — GABAPENTIN
300 POWDER (GRAM) MISCELLANEOUS
Refills: 0 | Status: DISCONTINUED | OUTPATIENT
Start: 2024-06-29 | End: 2024-06-29

## 2024-06-29 RX ORDER — GABAPENTIN
300 POWDER (GRAM) MISCELLANEOUS ONCE
Refills: 0 | Status: COMPLETED | OUTPATIENT
Start: 2024-06-29 | End: 2024-06-29

## 2024-06-29 RX ORDER — GABAPENTIN
1200 POWDER (GRAM) MISCELLANEOUS DAILY
Refills: 0 | Status: DISCONTINUED | OUTPATIENT
Start: 2024-06-29 | End: 2024-07-01

## 2024-06-29 RX ORDER — GABAPENTIN
600 POWDER (GRAM) MISCELLANEOUS
Refills: 0 | Status: DISCONTINUED | OUTPATIENT
Start: 2024-06-29 | End: 2024-07-02

## 2024-06-29 RX ADMIN — PANTOPRAZOLE SODIUM 40 MILLIGRAM(S): 40 INJECTION, POWDER, FOR SOLUTION INTRAVENOUS at 00:04

## 2024-06-29 RX ADMIN — Medication 50 MICROGRAM(S): at 05:15

## 2024-06-29 RX ADMIN — LOSARTAN POTASSIUM 100 MILLIGRAM(S): 100 TABLET, FILM COATED ORAL at 11:40

## 2024-06-29 RX ADMIN — PREDNISONE 5 MILLIGRAM(S): 10 TABLET ORAL at 05:15

## 2024-06-29 RX ADMIN — ROPINIROLE HYDROCHLORIDE 1 MILLIGRAM(S): 0.5 TABLET, FILM COATED ORAL at 19:02

## 2024-06-29 RX ADMIN — Medication 300 MILLIGRAM(S): at 12:16

## 2024-06-29 RX ADMIN — Medication 30 MILLILITER(S): at 00:04

## 2024-06-29 RX ADMIN — Medication 100 MILLIGRAM(S): at 11:39

## 2024-06-29 RX ADMIN — Medication 300 MILLIGRAM(S): at 15:11

## 2024-06-29 RX ADMIN — PANTOPRAZOLE SODIUM 40 MILLIGRAM(S): 40 INJECTION, POWDER, FOR SOLUTION INTRAVENOUS at 06:04

## 2024-06-29 RX ADMIN — PIPERACILLIN SODIUM AND TAZOBACTAM SODIUM 25 GRAM(S): 3; .375 INJECTION, POWDER, LYOPHILIZED, FOR SOLUTION INTRAVENOUS at 05:15

## 2024-06-29 RX ADMIN — PREDNISONE 5 MILLIGRAM(S): 10 TABLET ORAL at 17:31

## 2024-06-29 RX ADMIN — PIPERACILLIN SODIUM AND TAZOBACTAM SODIUM 25 GRAM(S): 3; .375 INJECTION, POWDER, LYOPHILIZED, FOR SOLUTION INTRAVENOUS at 22:48

## 2024-06-29 RX ADMIN — PIPERACILLIN SODIUM AND TAZOBACTAM SODIUM 25 GRAM(S): 3; .375 INJECTION, POWDER, LYOPHILIZED, FOR SOLUTION INTRAVENOUS at 13:54

## 2024-06-29 RX ADMIN — Medication 50 MILLIGRAM(S): at 05:15

## 2024-06-29 RX ADMIN — ENOXAPARIN SODIUM 40 MILLIGRAM(S): 100 INJECTION SUBCUTANEOUS at 19:02

## 2024-06-29 RX ADMIN — Medication 1200 MILLIGRAM(S): at 17:31

## 2024-06-30 LAB
ADD ON TEST-SPECIMEN IN LAB: SIGNIFICANT CHANGE UP
ALBUMIN SERPL ELPH-MCNC: 3.6 G/DL — SIGNIFICANT CHANGE UP (ref 3.3–5)
ALP SERPL-CCNC: 81 U/L — SIGNIFICANT CHANGE UP (ref 40–120)
ALT FLD-CCNC: 10 U/L — SIGNIFICANT CHANGE UP (ref 10–45)
ANION GAP SERPL CALC-SCNC: 8 MMOL/L — SIGNIFICANT CHANGE UP (ref 5–17)
AST SERPL-CCNC: 9 U/L — LOW (ref 10–40)
BASOPHILS # BLD AUTO: 0.04 K/UL — SIGNIFICANT CHANGE UP (ref 0–0.2)
BASOPHILS NFR BLD AUTO: 0.4 % — SIGNIFICANT CHANGE UP (ref 0–2)
BILIRUB SERPL-MCNC: 0.2 MG/DL — SIGNIFICANT CHANGE UP (ref 0.2–1.2)
BUN SERPL-MCNC: 16 MG/DL — SIGNIFICANT CHANGE UP (ref 7–23)
CALCIUM SERPL-MCNC: 8.8 MG/DL — SIGNIFICANT CHANGE UP (ref 8.4–10.5)
CHLORIDE SERPL-SCNC: 103 MMOL/L — SIGNIFICANT CHANGE UP (ref 96–108)
CO2 SERPL-SCNC: 27 MMOL/L — SIGNIFICANT CHANGE UP (ref 22–31)
CREAT SERPL-MCNC: 0.72 MG/DL — SIGNIFICANT CHANGE UP (ref 0.5–1.3)
EGFR: 90 ML/MIN/1.73M2 — SIGNIFICANT CHANGE UP
EOSINOPHIL # BLD AUTO: 0.22 K/UL — SIGNIFICANT CHANGE UP (ref 0–0.5)
EOSINOPHIL NFR BLD AUTO: 2 % — SIGNIFICANT CHANGE UP (ref 0–6)
GLUCOSE SERPL-MCNC: 96 MG/DL — SIGNIFICANT CHANGE UP (ref 70–99)
HCT VFR BLD CALC: 32.6 % — LOW (ref 34.5–45)
HGB BLD-MCNC: 9.9 G/DL — LOW (ref 11.5–15.5)
IMM GRANULOCYTES NFR BLD AUTO: 0.5 % — SIGNIFICANT CHANGE UP (ref 0–0.9)
LYMPHOCYTES # BLD AUTO: 2.35 K/UL — SIGNIFICANT CHANGE UP (ref 1–3.3)
LYMPHOCYTES # BLD AUTO: 21.6 % — SIGNIFICANT CHANGE UP (ref 13–44)
MAGNESIUM SERPL-MCNC: 2.2 MG/DL — SIGNIFICANT CHANGE UP (ref 1.6–2.6)
MCHC RBC-ENTMCNC: 29.3 PG — SIGNIFICANT CHANGE UP (ref 27–34)
MCHC RBC-ENTMCNC: 30.4 GM/DL — LOW (ref 32–36)
MCV RBC AUTO: 96.4 FL — SIGNIFICANT CHANGE UP (ref 80–100)
MONOCYTES # BLD AUTO: 0.65 K/UL — SIGNIFICANT CHANGE UP (ref 0–0.9)
MONOCYTES NFR BLD AUTO: 6 % — SIGNIFICANT CHANGE UP (ref 2–14)
NEUTROPHILS # BLD AUTO: 7.56 K/UL — HIGH (ref 1.8–7.4)
NEUTROPHILS NFR BLD AUTO: 69.5 % — SIGNIFICANT CHANGE UP (ref 43–77)
NRBC # BLD: 0 /100 WBCS — SIGNIFICANT CHANGE UP (ref 0–0)
PHOSPHATE SERPL-MCNC: 3 MG/DL — SIGNIFICANT CHANGE UP (ref 2.5–4.5)
PLATELET # BLD AUTO: 468 K/UL — HIGH (ref 150–400)
POTASSIUM SERPL-MCNC: 4.3 MMOL/L — SIGNIFICANT CHANGE UP (ref 3.5–5.3)
POTASSIUM SERPL-SCNC: 4.3 MMOL/L — SIGNIFICANT CHANGE UP (ref 3.5–5.3)
PROT SERPL-MCNC: 6.5 G/DL — SIGNIFICANT CHANGE UP (ref 6–8.3)
RBC # BLD: 3.38 M/UL — LOW (ref 3.8–5.2)
RBC # FLD: 14.9 % — HIGH (ref 10.3–14.5)
SODIUM SERPL-SCNC: 138 MMOL/L — SIGNIFICANT CHANGE UP (ref 135–145)
T4 FREE SERPL-MCNC: 1.27 NG/DL — SIGNIFICANT CHANGE UP (ref 0.93–1.7)
WBC # BLD: 10.87 K/UL — HIGH (ref 3.8–10.5)
WBC # FLD AUTO: 10.87 K/UL — HIGH (ref 3.8–10.5)

## 2024-06-30 RX ORDER — HEPARIN SODIUM 50 [USP'U]/ML
5000 INJECTION, SOLUTION INTRAVENOUS EVERY 8 HOURS
Refills: 0 | Status: DISCONTINUED | OUTPATIENT
Start: 2024-06-30 | End: 2024-07-02

## 2024-06-30 RX ORDER — LOSARTAN POTASSIUM 100 MG/1
100 TABLET, FILM COATED ORAL DAILY
Refills: 0 | Status: DISCONTINUED | OUTPATIENT
Start: 2024-07-01 | End: 2024-07-01

## 2024-06-30 RX ADMIN — ATORVASTATIN CALCIUM 40 MILLIGRAM(S): 20 TABLET, FILM COATED ORAL at 01:02

## 2024-06-30 RX ADMIN — ROPINIROLE HYDROCHLORIDE 1 MILLIGRAM(S): 0.5 TABLET, FILM COATED ORAL at 18:34

## 2024-06-30 RX ADMIN — IPRATROPIUM BROMIDE AND ALBUTEROL SULFATE 3 MILLILITER(S): .5; 3 SOLUTION RESPIRATORY (INHALATION) at 10:00

## 2024-06-30 RX ADMIN — LOSARTAN POTASSIUM 100 MILLIGRAM(S): 100 TABLET, FILM COATED ORAL at 06:07

## 2024-06-30 RX ADMIN — Medication 50 MILLIGRAM(S): at 06:08

## 2024-06-30 RX ADMIN — PREDNISONE 5 MILLIGRAM(S): 10 TABLET ORAL at 18:34

## 2024-06-30 RX ADMIN — Medication 50 MICROGRAM(S): at 06:07

## 2024-06-30 RX ADMIN — IPRATROPIUM BROMIDE AND ALBUTEROL SULFATE 3 MILLILITER(S): .5; 3 SOLUTION RESPIRATORY (INHALATION) at 18:37

## 2024-06-30 RX ADMIN — ENOXAPARIN SODIUM 40 MILLIGRAM(S): 100 INJECTION SUBCUTANEOUS at 18:39

## 2024-06-30 RX ADMIN — PIPERACILLIN SODIUM AND TAZOBACTAM SODIUM 25 GRAM(S): 3; .375 INJECTION, POWDER, LYOPHILIZED, FOR SOLUTION INTRAVENOUS at 10:19

## 2024-06-30 RX ADMIN — ATORVASTATIN CALCIUM 40 MILLIGRAM(S): 20 TABLET, FILM COATED ORAL at 22:08

## 2024-06-30 RX ADMIN — Medication 600 MILLIGRAM(S): at 10:00

## 2024-06-30 RX ADMIN — PIPERACILLIN SODIUM AND TAZOBACTAM SODIUM 25 GRAM(S): 3; .375 INJECTION, POWDER, LYOPHILIZED, FOR SOLUTION INTRAVENOUS at 18:34

## 2024-06-30 RX ADMIN — PREDNISONE 5 MILLIGRAM(S): 10 TABLET ORAL at 06:08

## 2024-06-30 RX ADMIN — HEPARIN SODIUM 5000 UNIT(S): 50 INJECTION, SOLUTION INTRAVENOUS at 22:08

## 2024-06-30 RX ADMIN — Medication 1200 MILLIGRAM(S): at 18:34

## 2024-06-30 RX ADMIN — Medication 600 MILLIGRAM(S): at 14:02

## 2024-06-30 RX ADMIN — Medication 100 MILLIGRAM(S): at 12:42

## 2024-06-30 RX ADMIN — PANTOPRAZOLE SODIUM 40 MILLIGRAM(S): 40 INJECTION, POWDER, FOR SOLUTION INTRAVENOUS at 07:48

## 2024-07-01 ENCOUNTER — TRANSCRIPTION ENCOUNTER (OUTPATIENT)
Age: 69
End: 2024-07-01

## 2024-07-01 LAB
ALBUMIN SERPL ELPH-MCNC: 3.5 G/DL — SIGNIFICANT CHANGE UP (ref 3.3–5)
ALP SERPL-CCNC: 86 U/L — SIGNIFICANT CHANGE UP (ref 40–120)
ALT FLD-CCNC: 15 U/L — SIGNIFICANT CHANGE UP (ref 10–45)
ANION GAP SERPL CALC-SCNC: 10 MMOL/L — SIGNIFICANT CHANGE UP (ref 5–17)
AST SERPL-CCNC: 14 U/L — SIGNIFICANT CHANGE UP (ref 10–40)
BASOPHILS # BLD AUTO: 0.04 K/UL — SIGNIFICANT CHANGE UP (ref 0–0.2)
BASOPHILS NFR BLD AUTO: 0.4 % — SIGNIFICANT CHANGE UP (ref 0–2)
BILIRUB SERPL-MCNC: 0.2 MG/DL — SIGNIFICANT CHANGE UP (ref 0.2–1.2)
BLD GP AB SCN SERPL QL: NEGATIVE — SIGNIFICANT CHANGE UP
BUN SERPL-MCNC: 10 MG/DL — SIGNIFICANT CHANGE UP (ref 7–23)
CALCIUM SERPL-MCNC: 8.8 MG/DL — SIGNIFICANT CHANGE UP (ref 8.4–10.5)
CHLORIDE SERPL-SCNC: 105 MMOL/L — SIGNIFICANT CHANGE UP (ref 96–108)
CO2 SERPL-SCNC: 24 MMOL/L — SIGNIFICANT CHANGE UP (ref 22–31)
CREAT SERPL-MCNC: 0.74 MG/DL — SIGNIFICANT CHANGE UP (ref 0.5–1.3)
EGFR: 87 ML/MIN/1.73M2 — SIGNIFICANT CHANGE UP
EOSINOPHIL # BLD AUTO: 0.09 K/UL — SIGNIFICANT CHANGE UP (ref 0–0.5)
EOSINOPHIL NFR BLD AUTO: 0.9 % — SIGNIFICANT CHANGE UP (ref 0–6)
GLUCOSE SERPL-MCNC: 73 MG/DL — SIGNIFICANT CHANGE UP (ref 70–99)
HCT VFR BLD CALC: 33.1 % — LOW (ref 34.5–45)
HGB BLD-MCNC: 9.8 G/DL — LOW (ref 11.5–15.5)
IMM GRANULOCYTES NFR BLD AUTO: 0.3 % — SIGNIFICANT CHANGE UP (ref 0–0.9)
LYMPHOCYTES # BLD AUTO: 2.25 K/UL — SIGNIFICANT CHANGE UP (ref 1–3.3)
LYMPHOCYTES # BLD AUTO: 23.2 % — SIGNIFICANT CHANGE UP (ref 13–44)
MAGNESIUM SERPL-MCNC: 2.6 MG/DL — SIGNIFICANT CHANGE UP (ref 1.6–2.6)
MCHC RBC-ENTMCNC: 29.1 PG — SIGNIFICANT CHANGE UP (ref 27–34)
MCHC RBC-ENTMCNC: 29.6 GM/DL — LOW (ref 32–36)
MCV RBC AUTO: 98.2 FL — SIGNIFICANT CHANGE UP (ref 80–100)
MONOCYTES # BLD AUTO: 0.65 K/UL — SIGNIFICANT CHANGE UP (ref 0–0.9)
MONOCYTES NFR BLD AUTO: 6.7 % — SIGNIFICANT CHANGE UP (ref 2–14)
NEUTROPHILS # BLD AUTO: 6.65 K/UL — SIGNIFICANT CHANGE UP (ref 1.8–7.4)
NEUTROPHILS NFR BLD AUTO: 68.5 % — SIGNIFICANT CHANGE UP (ref 43–77)
NRBC # BLD: 0 /100 WBCS — SIGNIFICANT CHANGE UP (ref 0–0)
PHOSPHATE SERPL-MCNC: 4 MG/DL — SIGNIFICANT CHANGE UP (ref 2.5–4.5)
PLATELET # BLD AUTO: 483 K/UL — HIGH (ref 150–400)
POTASSIUM SERPL-MCNC: 4.3 MMOL/L — SIGNIFICANT CHANGE UP (ref 3.5–5.3)
POTASSIUM SERPL-SCNC: 4.3 MMOL/L — SIGNIFICANT CHANGE UP (ref 3.5–5.3)
PROT SERPL-MCNC: 6.4 G/DL — SIGNIFICANT CHANGE UP (ref 6–8.3)
RBC # BLD: 3.37 M/UL — LOW (ref 3.8–5.2)
RBC # FLD: 15.1 % — HIGH (ref 10.3–14.5)
RH IG SCN BLD-IMP: POSITIVE — SIGNIFICANT CHANGE UP
SODIUM SERPL-SCNC: 139 MMOL/L — SIGNIFICANT CHANGE UP (ref 135–145)
WBC # BLD: 9.71 K/UL — SIGNIFICANT CHANGE UP (ref 3.8–10.5)
WBC # FLD AUTO: 9.71 K/UL — SIGNIFICANT CHANGE UP (ref 3.8–10.5)

## 2024-07-01 PROCEDURE — 99232 SBSQ HOSP IP/OBS MODERATE 35: CPT

## 2024-07-01 RX ORDER — PREDNISONE 10 MG/1
1 TABLET ORAL
Refills: 0 | DISCHARGE

## 2024-07-01 RX ORDER — ROPINIROLE HYDROCHLORIDE 0.5 MG/1
1 TABLET, FILM COATED ORAL EVERY 24 HOURS
Refills: 0 | Status: DISCONTINUED | OUTPATIENT
Start: 2024-07-01 | End: 2024-07-02

## 2024-07-01 RX ORDER — ROPINIROLE HYDROCHLORIDE 0.5 MG/1
1 TABLET, FILM COATED ORAL
Refills: 0 | DISCHARGE

## 2024-07-01 RX ORDER — PREDNISONE 10 MG/1
2 TABLET ORAL
Refills: 0 | DISCHARGE

## 2024-07-01 RX ORDER — GABAPENTIN
1200 POWDER (GRAM) MISCELLANEOUS EVERY 24 HOURS
Refills: 0 | Status: DISCONTINUED | OUTPATIENT
Start: 2024-07-01 | End: 2024-07-02

## 2024-07-01 RX ORDER — ROPINIROLE HYDROCHLORIDE 0.5 MG/1
0.5 TABLET, FILM COATED ORAL
Refills: 0 | DISCHARGE

## 2024-07-01 RX ORDER — GABAPENTIN 400 MG/1
2 CAPSULE ORAL
Refills: 0 | DISCHARGE

## 2024-07-01 RX ORDER — HYDROCODONE BITARTRATE AND ACETAMINOPHEN 7.5; 325 MG/15ML; MG/15ML
1 SOLUTION ORAL
Refills: 0 | DISCHARGE

## 2024-07-01 RX ORDER — GABAPENTIN
4 POWDER (GRAM) MISCELLANEOUS
Refills: 0 | DISCHARGE

## 2024-07-01 RX ORDER — ROPINIROLE 8 MG/1
1 TABLET, FILM COATED, EXTENDED RELEASE ORAL
Refills: 0 | DISCHARGE

## 2024-07-01 RX ADMIN — Medication 100 MILLIGRAM(S): at 13:19

## 2024-07-01 RX ADMIN — IPRATROPIUM BROMIDE AND ALBUTEROL SULFATE 3 MILLILITER(S): .5; 3 SOLUTION RESPIRATORY (INHALATION) at 13:19

## 2024-07-01 RX ADMIN — PREDNISONE 5 MILLIGRAM(S): 10 TABLET ORAL at 17:40

## 2024-07-01 RX ADMIN — HEPARIN SODIUM 5000 UNIT(S): 50 INJECTION, SOLUTION INTRAVENOUS at 13:20

## 2024-07-01 RX ADMIN — PREDNISONE 5 MILLIGRAM(S): 10 TABLET ORAL at 06:41

## 2024-07-01 RX ADMIN — PANTOPRAZOLE SODIUM 40 MILLIGRAM(S): 40 INJECTION, POWDER, FOR SOLUTION INTRAVENOUS at 06:41

## 2024-07-01 RX ADMIN — IPRATROPIUM BROMIDE AND ALBUTEROL SULFATE 3 MILLILITER(S): .5; 3 SOLUTION RESPIRATORY (INHALATION) at 06:40

## 2024-07-01 RX ADMIN — ATORVASTATIN CALCIUM 40 MILLIGRAM(S): 20 TABLET, FILM COATED ORAL at 21:23

## 2024-07-01 RX ADMIN — IPRATROPIUM BROMIDE AND ALBUTEROL SULFATE 3 MILLILITER(S): .5; 3 SOLUTION RESPIRATORY (INHALATION) at 19:11

## 2024-07-01 RX ADMIN — PIPERACILLIN SODIUM AND TAZOBACTAM SODIUM 25 GRAM(S): 3; .375 INJECTION, POWDER, LYOPHILIZED, FOR SOLUTION INTRAVENOUS at 21:22

## 2024-07-01 RX ADMIN — Medication 600 MILLIGRAM(S): at 13:20

## 2024-07-01 RX ADMIN — LOSARTAN POTASSIUM 100 MILLIGRAM(S): 100 TABLET, FILM COATED ORAL at 06:41

## 2024-07-01 RX ADMIN — PIPERACILLIN SODIUM AND TAZOBACTAM SODIUM 25 GRAM(S): 3; .375 INJECTION, POWDER, LYOPHILIZED, FOR SOLUTION INTRAVENOUS at 03:32

## 2024-07-01 RX ADMIN — HEPARIN SODIUM 5000 UNIT(S): 50 INJECTION, SOLUTION INTRAVENOUS at 21:23

## 2024-07-01 RX ADMIN — Medication 50 MICROGRAM(S): at 06:41

## 2024-07-01 RX ADMIN — Medication 50 MILLIGRAM(S): at 06:41

## 2024-07-01 RX ADMIN — ROPINIROLE HYDROCHLORIDE 1 MILLIGRAM(S): 0.5 TABLET, FILM COATED ORAL at 17:39

## 2024-07-01 RX ADMIN — IPRATROPIUM BROMIDE AND ALBUTEROL SULFATE 3 MILLILITER(S): .5; 3 SOLUTION RESPIRATORY (INHALATION) at 00:13

## 2024-07-01 RX ADMIN — HEPARIN SODIUM 5000 UNIT(S): 50 INJECTION, SOLUTION INTRAVENOUS at 06:40

## 2024-07-01 RX ADMIN — Medication 1200 MILLIGRAM(S): at 17:39

## 2024-07-01 RX ADMIN — Medication 600 MILLIGRAM(S): at 09:41

## 2024-07-01 RX ADMIN — PIPERACILLIN SODIUM AND TAZOBACTAM SODIUM 25 GRAM(S): 3; .375 INJECTION, POWDER, LYOPHILIZED, FOR SOLUTION INTRAVENOUS at 13:31

## 2024-07-02 ENCOUNTER — RESULT REVIEW (OUTPATIENT)
Age: 69
End: 2024-07-02

## 2024-07-02 ENCOUNTER — APPOINTMENT (OUTPATIENT)
Dept: THORACIC SURGERY | Facility: HOSPITAL | Age: 69
End: 2024-07-02

## 2024-07-02 LAB
ALBUMIN SERPL ELPH-MCNC: 3.6 G/DL — SIGNIFICANT CHANGE UP (ref 3.3–5)
ALP SERPL-CCNC: 89 U/L — SIGNIFICANT CHANGE UP (ref 40–120)
ALT FLD-CCNC: 15 U/L — SIGNIFICANT CHANGE UP (ref 10–45)
ANION GAP SERPL CALC-SCNC: 11 MMOL/L — SIGNIFICANT CHANGE UP (ref 5–17)
ANION GAP SERPL CALC-SCNC: 11 MMOL/L — SIGNIFICANT CHANGE UP (ref 5–17)
ANISOCYTOSIS BLD QL: SLIGHT — SIGNIFICANT CHANGE UP
APTT BLD: 28 SEC — SIGNIFICANT CHANGE UP (ref 24.5–35.6)
AST SERPL-CCNC: 10 U/L — SIGNIFICANT CHANGE UP (ref 10–40)
BASOPHILS # BLD AUTO: 0 K/UL — SIGNIFICANT CHANGE UP (ref 0–0.2)
BASOPHILS # BLD AUTO: 0.04 K/UL — SIGNIFICANT CHANGE UP (ref 0–0.2)
BASOPHILS NFR BLD AUTO: 0 % — SIGNIFICANT CHANGE UP (ref 0–2)
BASOPHILS NFR BLD AUTO: 0.4 % — SIGNIFICANT CHANGE UP (ref 0–2)
BILIRUB SERPL-MCNC: 0.2 MG/DL — SIGNIFICANT CHANGE UP (ref 0.2–1.2)
BLD GP AB SCN SERPL QL: NEGATIVE — SIGNIFICANT CHANGE UP
BUN SERPL-MCNC: 7 MG/DL — SIGNIFICANT CHANGE UP (ref 7–23)
BUN SERPL-MCNC: 8 MG/DL — SIGNIFICANT CHANGE UP (ref 7–23)
CALCIUM SERPL-MCNC: 8.6 MG/DL — SIGNIFICANT CHANGE UP (ref 8.4–10.5)
CALCIUM SERPL-MCNC: 9 MG/DL — SIGNIFICANT CHANGE UP (ref 8.4–10.5)
CHLORIDE SERPL-SCNC: 104 MMOL/L — SIGNIFICANT CHANGE UP (ref 96–108)
CHLORIDE SERPL-SCNC: 108 MMOL/L — SIGNIFICANT CHANGE UP (ref 96–108)
CO2 SERPL-SCNC: 25 MMOL/L — SIGNIFICANT CHANGE UP (ref 22–31)
CO2 SERPL-SCNC: 26 MMOL/L — SIGNIFICANT CHANGE UP (ref 22–31)
CREAT SERPL-MCNC: 0.71 MG/DL — SIGNIFICANT CHANGE UP (ref 0.5–1.3)
CREAT SERPL-MCNC: 0.72 MG/DL — SIGNIFICANT CHANGE UP (ref 0.5–1.3)
DACRYOCYTES BLD QL SMEAR: SLIGHT — SIGNIFICANT CHANGE UP
EGFR: 90 ML/MIN/1.73M2 — SIGNIFICANT CHANGE UP
EGFR: 91 ML/MIN/1.73M2 — SIGNIFICANT CHANGE UP
EOSINOPHIL # BLD AUTO: 0 K/UL — SIGNIFICANT CHANGE UP (ref 0–0.5)
EOSINOPHIL # BLD AUTO: 0.08 K/UL — SIGNIFICANT CHANGE UP (ref 0–0.5)
EOSINOPHIL NFR BLD AUTO: 0 % — SIGNIFICANT CHANGE UP (ref 0–6)
EOSINOPHIL NFR BLD AUTO: 0.8 % — SIGNIFICANT CHANGE UP (ref 0–6)
GIANT PLATELETS BLD QL SMEAR: PRESENT — SIGNIFICANT CHANGE UP
GLUCOSE BLDC GLUCOMTR-MCNC: 93 MG/DL — SIGNIFICANT CHANGE UP (ref 70–99)
GLUCOSE SERPL-MCNC: 199 MG/DL — HIGH (ref 70–99)
GLUCOSE SERPL-MCNC: 92 MG/DL — SIGNIFICANT CHANGE UP (ref 70–99)
HCT VFR BLD CALC: 31.4 % — LOW (ref 34.5–45)
HCT VFR BLD CALC: 36 % — SIGNIFICANT CHANGE UP (ref 34.5–45)
HGB BLD-MCNC: 11 G/DL — LOW (ref 11.5–15.5)
HGB BLD-MCNC: 9.5 G/DL — LOW (ref 11.5–15.5)
HYPOCHROMIA BLD QL: SIGNIFICANT CHANGE UP
IMM GRANULOCYTES NFR BLD AUTO: 0.4 % — SIGNIFICANT CHANGE UP (ref 0–0.9)
INR BLD: 0.99 — SIGNIFICANT CHANGE UP (ref 0.85–1.18)
LYMPHOCYTES # BLD AUTO: 0.19 K/UL — LOW (ref 1–3.3)
LYMPHOCYTES # BLD AUTO: 0.9 % — LOW (ref 13–44)
LYMPHOCYTES # BLD AUTO: 2.17 K/UL — SIGNIFICANT CHANGE UP (ref 1–3.3)
LYMPHOCYTES # BLD AUTO: 22.6 % — SIGNIFICANT CHANGE UP (ref 13–44)
MACROCYTES BLD QL: SLIGHT — SIGNIFICANT CHANGE UP
MAGNESIUM SERPL-MCNC: 2.3 MG/DL — SIGNIFICANT CHANGE UP (ref 1.6–2.6)
MANUAL SMEAR VERIFICATION: SIGNIFICANT CHANGE UP
MCHC RBC-ENTMCNC: 28.7 PG — SIGNIFICANT CHANGE UP (ref 27–34)
MCHC RBC-ENTMCNC: 29.2 PG — SIGNIFICANT CHANGE UP (ref 27–34)
MCHC RBC-ENTMCNC: 30.3 GM/DL — LOW (ref 32–36)
MCHC RBC-ENTMCNC: 30.6 GM/DL — LOW (ref 32–36)
MCV RBC AUTO: 94.9 FL — SIGNIFICANT CHANGE UP (ref 80–100)
MCV RBC AUTO: 95.5 FL — SIGNIFICANT CHANGE UP (ref 80–100)
MICROCYTES BLD QL: SLIGHT — SIGNIFICANT CHANGE UP
MONOCYTES # BLD AUTO: 0.35 K/UL — SIGNIFICANT CHANGE UP (ref 0–0.9)
MONOCYTES # BLD AUTO: 0.5 K/UL — SIGNIFICANT CHANGE UP (ref 0–0.9)
MONOCYTES NFR BLD AUTO: 1.7 % — LOW (ref 2–14)
MONOCYTES NFR BLD AUTO: 5.2 % — SIGNIFICANT CHANGE UP (ref 2–14)
NEUTROPHILS # BLD AUTO: 20.14 K/UL — HIGH (ref 1.8–7.4)
NEUTROPHILS # BLD AUTO: 6.76 K/UL — SIGNIFICANT CHANGE UP (ref 1.8–7.4)
NEUTROPHILS NFR BLD AUTO: 70.6 % — SIGNIFICANT CHANGE UP (ref 43–77)
NEUTROPHILS NFR BLD AUTO: 97.4 % — HIGH (ref 43–77)
NRBC # BLD: 0 /100 WBCS — SIGNIFICANT CHANGE UP (ref 0–0)
OVALOCYTES BLD QL SMEAR: SLIGHT — SIGNIFICANT CHANGE UP
PHOSPHATE SERPL-MCNC: 4.2 MG/DL — SIGNIFICANT CHANGE UP (ref 2.5–4.5)
PLAT MORPH BLD: ABNORMAL
PLATELET # BLD AUTO: 458 K/UL — HIGH (ref 150–400)
PLATELET # BLD AUTO: 582 K/UL — HIGH (ref 150–400)
POIKILOCYTOSIS BLD QL AUTO: SIGNIFICANT CHANGE UP
POLYCHROMASIA BLD QL SMEAR: SLIGHT — SIGNIFICANT CHANGE UP
POTASSIUM SERPL-MCNC: 4 MMOL/L — SIGNIFICANT CHANGE UP (ref 3.5–5.3)
POTASSIUM SERPL-MCNC: 5.1 MMOL/L — SIGNIFICANT CHANGE UP (ref 3.5–5.3)
POTASSIUM SERPL-SCNC: 4 MMOL/L — SIGNIFICANT CHANGE UP (ref 3.5–5.3)
POTASSIUM SERPL-SCNC: 5.1 MMOL/L — SIGNIFICANT CHANGE UP (ref 3.5–5.3)
PROT SERPL-MCNC: 6.3 G/DL — SIGNIFICANT CHANGE UP (ref 6–8.3)
PROTHROM AB SERPL-ACNC: 11.3 SEC — SIGNIFICANT CHANGE UP (ref 9.5–13)
RBC # BLD: 3.31 M/UL — LOW (ref 3.8–5.2)
RBC # BLD: 3.77 M/UL — LOW (ref 3.8–5.2)
RBC # FLD: 15.2 % — HIGH (ref 10.3–14.5)
RBC # FLD: 15.5 % — HIGH (ref 10.3–14.5)
RBC BLD AUTO: ABNORMAL
RH IG SCN BLD-IMP: POSITIVE — SIGNIFICANT CHANGE UP
SODIUM SERPL-SCNC: 140 MMOL/L — SIGNIFICANT CHANGE UP (ref 135–145)
SODIUM SERPL-SCNC: 145 MMOL/L — SIGNIFICANT CHANGE UP (ref 135–145)
SPHEROCYTES BLD QL SMEAR: SLIGHT — SIGNIFICANT CHANGE UP
STOMATOCYTES BLD QL SMEAR: SLIGHT — SIGNIFICANT CHANGE UP
WBC # BLD: 20.68 K/UL — HIGH (ref 3.8–10.5)
WBC # BLD: 9.59 K/UL — SIGNIFICANT CHANGE UP (ref 3.8–10.5)
WBC # FLD AUTO: 20.68 K/UL — HIGH (ref 3.8–10.5)
WBC # FLD AUTO: 9.59 K/UL — SIGNIFICANT CHANGE UP (ref 3.8–10.5)

## 2024-07-02 PROCEDURE — 88302 TISSUE EXAM BY PATHOLOGIST: CPT | Mod: 26

## 2024-07-02 PROCEDURE — 43281 LAP PARAESOPHAG HERN REPAIR: CPT

## 2024-07-02 PROCEDURE — 71045 X-RAY EXAM CHEST 1 VIEW: CPT | Mod: 26

## 2024-07-02 PROCEDURE — 88313 SPECIAL STAINS GROUP 2: CPT | Mod: 26

## 2024-07-02 DEVICE — LIGATING CLIPS WECK HORIZON SMALL-WIDE (RED) 6: Type: IMPLANTABLE DEVICE | Status: FUNCTIONAL

## 2024-07-02 RX ORDER — OXYCODONE HYDROCHLORIDE 100 MG/5ML
10 SOLUTION ORAL EVERY 6 HOURS
Refills: 0 | Status: DISCONTINUED | OUTPATIENT
Start: 2024-07-02 | End: 2024-07-03

## 2024-07-02 RX ORDER — AMITRIPTYLINE HCL 10 MG
100 TABLET ORAL DAILY
Refills: 0 | Status: DISCONTINUED | OUTPATIENT
Start: 2024-07-02 | End: 2024-07-03

## 2024-07-02 RX ORDER — FAMOTIDINE 40 MG
20 TABLET ORAL DAILY
Refills: 0 | Status: DISCONTINUED | OUTPATIENT
Start: 2024-07-02 | End: 2024-07-02

## 2024-07-02 RX ORDER — DEXTROSE MONOHYDRATE AND SODIUM CHLORIDE 5; .3 G/100ML; G/100ML
1000 INJECTION, SOLUTION INTRAVENOUS
Refills: 0 | Status: DISCONTINUED | OUTPATIENT
Start: 2024-07-02 | End: 2024-07-03

## 2024-07-02 RX ORDER — ACETAMINOPHEN 325 MG
1000 TABLET ORAL ONCE
Refills: 0 | Status: COMPLETED | OUTPATIENT
Start: 2024-07-02 | End: 2024-07-02

## 2024-07-02 RX ORDER — PANTOPRAZOLE SODIUM 40 MG/10ML
40 INJECTION, POWDER, FOR SOLUTION INTRAVENOUS
Refills: 0 | Status: DISCONTINUED | OUTPATIENT
Start: 2024-07-02 | End: 2024-07-03

## 2024-07-02 RX ORDER — OXYCODONE AND ACETAMINOPHEN 5; 325 MG/1; MG/1
2 TABLET ORAL EVERY 8 HOURS
Refills: 0 | Status: DISCONTINUED | OUTPATIENT
Start: 2024-07-02 | End: 2024-07-02

## 2024-07-02 RX ORDER — GABAPENTIN
1200 POWDER (GRAM) MISCELLANEOUS DAILY
Refills: 0 | Status: DISCONTINUED | OUTPATIENT
Start: 2024-07-02 | End: 2024-07-03

## 2024-07-02 RX ORDER — IPRATROPIUM BROMIDE AND ALBUTEROL SULFATE .5; 3 MG/3ML; MG/3ML
3 SOLUTION RESPIRATORY (INHALATION) EVERY 6 HOURS
Refills: 0 | Status: DISCONTINUED | OUTPATIENT
Start: 2024-07-02 | End: 2024-07-03

## 2024-07-02 RX ORDER — LIDOCAINE HCL 28 MG/G
1 GEL TOPICAL DAILY
Refills: 0 | Status: DISCONTINUED | OUTPATIENT
Start: 2024-07-02 | End: 2024-07-03

## 2024-07-02 RX ORDER — HYDROMORPHONE HCL 0.2 MG/ML
0.2 INJECTION, SOLUTION INTRAVENOUS
Refills: 0 | Status: DISCONTINUED | OUTPATIENT
Start: 2024-07-02 | End: 2024-07-03

## 2024-07-02 RX ORDER — ROPINIROLE HYDROCHLORIDE 0.5 MG/1
1 TABLET, FILM COATED ORAL EVERY 24 HOURS
Refills: 0 | Status: DISCONTINUED | OUTPATIENT
Start: 2024-07-02 | End: 2024-07-03

## 2024-07-02 RX ORDER — GABAPENTIN
600 POWDER (GRAM) MISCELLANEOUS
Refills: 0 | Status: DISCONTINUED | OUTPATIENT
Start: 2024-07-02 | End: 2024-07-03

## 2024-07-02 RX ORDER — PIPERACILLIN SODIUM AND TAZOBACTAM SODIUM 3; .375 G/15ML; G/15ML
3.38 INJECTION, POWDER, LYOPHILIZED, FOR SOLUTION INTRAVENOUS EVERY 8 HOURS
Refills: 0 | Status: DISCONTINUED | OUTPATIENT
Start: 2024-07-02 | End: 2024-07-03

## 2024-07-02 RX ORDER — ONDANSETRON HYDROCHLORIDE 2 MG/ML
4 INJECTION INTRAMUSCULAR; INTRAVENOUS EVERY 8 HOURS
Refills: 0 | Status: DISCONTINUED | OUTPATIENT
Start: 2024-07-02 | End: 2024-07-03

## 2024-07-02 RX ORDER — CEFAZOLIN 10 G/1
2000 INJECTION, POWDER, FOR SOLUTION INTRAVENOUS EVERY 8 HOURS
Refills: 0 | Status: DISCONTINUED | OUTPATIENT
Start: 2024-07-02 | End: 2024-07-02

## 2024-07-02 RX ORDER — GABAPENTIN
1200 POWDER (GRAM) MISCELLANEOUS DAILY
Refills: 0 | Status: DISCONTINUED | OUTPATIENT
Start: 2024-07-02 | End: 2024-07-02

## 2024-07-02 RX ORDER — PREDNISONE 10 MG/1
5 TABLET ORAL EVERY 12 HOURS
Refills: 0 | Status: DISCONTINUED | OUTPATIENT
Start: 2024-07-02 | End: 2024-07-03

## 2024-07-02 RX ORDER — OXYCODONE HYDROCHLORIDE 100 MG/5ML
5 SOLUTION ORAL EVERY 6 HOURS
Refills: 0 | Status: DISCONTINUED | OUTPATIENT
Start: 2024-07-02 | End: 2024-07-03

## 2024-07-02 RX ORDER — ACETAMINOPHEN 325 MG
650 TABLET ORAL EVERY 6 HOURS
Refills: 0 | Status: DISCONTINUED | OUTPATIENT
Start: 2024-07-03 | End: 2024-07-03

## 2024-07-02 RX ORDER — LORAZEPAM 0.5 MG
2 TABLET ORAL ONCE
Refills: 0 | Status: DISCONTINUED | OUTPATIENT
Start: 2024-07-02 | End: 2024-07-02

## 2024-07-02 RX ORDER — ATORVASTATIN CALCIUM 20 MG/1
40 TABLET, FILM COATED ORAL AT BEDTIME
Refills: 0 | Status: DISCONTINUED | OUTPATIENT
Start: 2024-07-02 | End: 2024-07-03

## 2024-07-02 RX ORDER — ENOXAPARIN SODIUM 100 MG/ML
40 INJECTION SUBCUTANEOUS EVERY 24 HOURS
Refills: 0 | Status: DISCONTINUED | OUTPATIENT
Start: 2024-07-03 | End: 2024-07-03

## 2024-07-02 RX ORDER — LEVOTHYROXINE SODIUM 25 MCG
50 TABLET ORAL DAILY
Refills: 0 | Status: DISCONTINUED | OUTPATIENT
Start: 2024-07-02 | End: 2024-07-03

## 2024-07-02 RX ADMIN — Medication 1000 MILLIGRAM(S): at 15:00

## 2024-07-02 RX ADMIN — OXYCODONE HYDROCHLORIDE 5 MILLIGRAM(S): 100 SOLUTION ORAL at 23:09

## 2024-07-02 RX ADMIN — Medication 100 MILLIGRAM(S): at 23:14

## 2024-07-02 RX ADMIN — Medication 400 MILLIGRAM(S): at 14:31

## 2024-07-02 RX ADMIN — PREDNISONE 5 MILLIGRAM(S): 10 TABLET ORAL at 06:34

## 2024-07-02 RX ADMIN — OXYCODONE HYDROCHLORIDE 5 MILLIGRAM(S): 100 SOLUTION ORAL at 22:07

## 2024-07-02 RX ADMIN — PIPERACILLIN SODIUM AND TAZOBACTAM SODIUM 25 GRAM(S): 3; .375 INJECTION, POWDER, LYOPHILIZED, FOR SOLUTION INTRAVENOUS at 04:53

## 2024-07-02 RX ADMIN — ATORVASTATIN CALCIUM 40 MILLIGRAM(S): 20 TABLET, FILM COATED ORAL at 22:06

## 2024-07-02 RX ADMIN — PREDNISONE 5 MILLIGRAM(S): 10 TABLET ORAL at 18:09

## 2024-07-02 RX ADMIN — HYDROMORPHONE HCL 0.2 MILLIGRAM(S): 0.2 INJECTION, SOLUTION INTRAVENOUS at 23:15

## 2024-07-02 RX ADMIN — HYDROMORPHONE HCL 0.2 MILLIGRAM(S): 0.2 INJECTION, SOLUTION INTRAVENOUS at 19:56

## 2024-07-02 RX ADMIN — Medication 1000 MILLIGRAM(S): at 23:09

## 2024-07-02 RX ADMIN — Medication 400 MILLIGRAM(S): at 22:07

## 2024-07-02 RX ADMIN — HYDROMORPHONE HCL 0.2 MILLIGRAM(S): 0.2 INJECTION, SOLUTION INTRAVENOUS at 15:20

## 2024-07-02 RX ADMIN — HYDROMORPHONE HCL 0.2 MILLIGRAM(S): 0.2 INJECTION, SOLUTION INTRAVENOUS at 14:52

## 2024-07-02 RX ADMIN — Medication 10 MILLIGRAM(S): at 15:50

## 2024-07-02 RX ADMIN — Medication 50 MICROGRAM(S): at 06:34

## 2024-07-02 RX ADMIN — OXYCODONE HYDROCHLORIDE 10 MILLIGRAM(S): 100 SOLUTION ORAL at 18:08

## 2024-07-02 RX ADMIN — HEPARIN SODIUM 5000 UNIT(S): 50 INJECTION, SOLUTION INTRAVENOUS at 06:36

## 2024-07-02 RX ADMIN — HYDROMORPHONE HCL 0.2 MILLIGRAM(S): 0.2 INJECTION, SOLUTION INTRAVENOUS at 20:11

## 2024-07-02 RX ADMIN — PIPERACILLIN SODIUM AND TAZOBACTAM SODIUM 25 GRAM(S): 3; .375 INJECTION, POWDER, LYOPHILIZED, FOR SOLUTION INTRAVENOUS at 15:04

## 2024-07-02 RX ADMIN — Medication 1200 MILLIGRAM(S): at 18:14

## 2024-07-02 RX ADMIN — Medication 50 MILLIGRAM(S): at 06:34

## 2024-07-02 RX ADMIN — ROPINIROLE HYDROCHLORIDE 1 MILLIGRAM(S): 0.5 TABLET, FILM COATED ORAL at 19:42

## 2024-07-02 RX ADMIN — LIDOCAINE HCL 1 PATCH: 28 GEL TOPICAL at 14:52

## 2024-07-02 RX ADMIN — LIDOCAINE HCL 1 PATCH: 28 GEL TOPICAL at 19:35

## 2024-07-02 RX ADMIN — IPRATROPIUM BROMIDE AND ALBUTEROL SULFATE 3 MILLILITER(S): .5; 3 SOLUTION RESPIRATORY (INHALATION) at 00:49

## 2024-07-02 RX ADMIN — PIPERACILLIN SODIUM AND TAZOBACTAM SODIUM 25 GRAM(S): 3; .375 INJECTION, POWDER, LYOPHILIZED, FOR SOLUTION INTRAVENOUS at 22:07

## 2024-07-02 RX ADMIN — DEXTROSE MONOHYDRATE AND SODIUM CHLORIDE 60 MILLILITER(S): 5; .3 INJECTION, SOLUTION INTRAVENOUS at 18:20

## 2024-07-02 RX ADMIN — DEXTROSE MONOHYDRATE AND SODIUM CHLORIDE 60 MILLILITER(S): 5; .3 INJECTION, SOLUTION INTRAVENOUS at 14:53

## 2024-07-02 RX ADMIN — PANTOPRAZOLE SODIUM 40 MILLIGRAM(S): 40 INJECTION, POWDER, FOR SOLUTION INTRAVENOUS at 06:34

## 2024-07-02 RX ADMIN — IPRATROPIUM BROMIDE AND ALBUTEROL SULFATE 3 MILLILITER(S): .5; 3 SOLUTION RESPIRATORY (INHALATION) at 14:53

## 2024-07-02 RX ADMIN — OXYCODONE HYDROCHLORIDE 10 MILLIGRAM(S): 100 SOLUTION ORAL at 19:08

## 2024-07-03 ENCOUNTER — TRANSCRIPTION ENCOUNTER (OUTPATIENT)
Age: 69
End: 2024-07-03

## 2024-07-03 VITALS
RESPIRATION RATE: 12 BRPM | DIASTOLIC BLOOD PRESSURE: 54 MMHG | HEART RATE: 92 BPM | OXYGEN SATURATION: 93 % | SYSTOLIC BLOOD PRESSURE: 98 MMHG

## 2024-07-03 LAB
ANION GAP SERPL CALC-SCNC: 11 MMOL/L — SIGNIFICANT CHANGE UP (ref 5–17)
BASOPHILS # BLD AUTO: 0.02 K/UL — SIGNIFICANT CHANGE UP (ref 0–0.2)
BASOPHILS NFR BLD AUTO: 0.1 % — SIGNIFICANT CHANGE UP (ref 0–2)
BUN SERPL-MCNC: 8 MG/DL — SIGNIFICANT CHANGE UP (ref 7–23)
CALCIUM SERPL-MCNC: 8.8 MG/DL — SIGNIFICANT CHANGE UP (ref 8.4–10.5)
CHLORIDE SERPL-SCNC: 104 MMOL/L — SIGNIFICANT CHANGE UP (ref 96–108)
CO2 SERPL-SCNC: 26 MMOL/L — SIGNIFICANT CHANGE UP (ref 22–31)
CREAT SERPL-MCNC: 0.69 MG/DL — SIGNIFICANT CHANGE UP (ref 0.5–1.3)
CULTURE RESULTS: SIGNIFICANT CHANGE UP
CULTURE RESULTS: SIGNIFICANT CHANGE UP
EGFR: 93 ML/MIN/1.73M2 — SIGNIFICANT CHANGE UP
EOSINOPHIL # BLD AUTO: 0 K/UL — SIGNIFICANT CHANGE UP (ref 0–0.5)
EOSINOPHIL NFR BLD AUTO: 0 % — SIGNIFICANT CHANGE UP (ref 0–6)
GLUCOSE SERPL-MCNC: 103 MG/DL — HIGH (ref 70–99)
HCT VFR BLD CALC: 38.3 % — SIGNIFICANT CHANGE UP (ref 34.5–45)
HGB BLD-MCNC: 11.2 G/DL — LOW (ref 11.5–15.5)
IMM GRANULOCYTES NFR BLD AUTO: 0.6 % — SIGNIFICANT CHANGE UP (ref 0–0.9)
LYMPHOCYTES # BLD AUTO: 1.06 K/UL — SIGNIFICANT CHANGE UP (ref 1–3.3)
LYMPHOCYTES # BLD AUTO: 6 % — LOW (ref 13–44)
MCHC RBC-ENTMCNC: 28.5 PG — SIGNIFICANT CHANGE UP (ref 27–34)
MCHC RBC-ENTMCNC: 29.2 GM/DL — LOW (ref 32–36)
MCV RBC AUTO: 97.5 FL — SIGNIFICANT CHANGE UP (ref 80–100)
MONOCYTES # BLD AUTO: 1.26 K/UL — HIGH (ref 0–0.9)
MONOCYTES NFR BLD AUTO: 7.1 % — SIGNIFICANT CHANGE UP (ref 2–14)
NEUTROPHILS # BLD AUTO: 15.26 K/UL — HIGH (ref 1.8–7.4)
NEUTROPHILS NFR BLD AUTO: 86.2 % — HIGH (ref 43–77)
NRBC # BLD: 0 /100 WBCS — SIGNIFICANT CHANGE UP (ref 0–0)
PLATELET # BLD AUTO: 527 K/UL — HIGH (ref 150–400)
POTASSIUM SERPL-MCNC: 4.3 MMOL/L — SIGNIFICANT CHANGE UP (ref 3.5–5.3)
POTASSIUM SERPL-SCNC: 4.3 MMOL/L — SIGNIFICANT CHANGE UP (ref 3.5–5.3)
RBC # BLD: 3.93 M/UL — SIGNIFICANT CHANGE UP (ref 3.8–5.2)
RBC # FLD: 15.1 % — HIGH (ref 10.3–14.5)
SODIUM SERPL-SCNC: 141 MMOL/L — SIGNIFICANT CHANGE UP (ref 135–145)
SPECIMEN SOURCE: SIGNIFICANT CHANGE UP
SPECIMEN SOURCE: SIGNIFICANT CHANGE UP
WBC # BLD: 17.7 K/UL — HIGH (ref 3.8–10.5)
WBC # FLD AUTO: 17.7 K/UL — HIGH (ref 3.8–10.5)

## 2024-07-03 PROCEDURE — 85610 PROTHROMBIN TIME: CPT

## 2024-07-03 PROCEDURE — 83735 ASSAY OF MAGNESIUM: CPT

## 2024-07-03 PROCEDURE — 99285 EMERGENCY DEPT VISIT HI MDM: CPT | Mod: 25

## 2024-07-03 PROCEDURE — 84100 ASSAY OF PHOSPHORUS: CPT

## 2024-07-03 PROCEDURE — 87040 BLOOD CULTURE FOR BACTERIA: CPT

## 2024-07-03 PROCEDURE — 84484 ASSAY OF TROPONIN QUANT: CPT

## 2024-07-03 PROCEDURE — 99223 1ST HOSP IP/OBS HIGH 75: CPT

## 2024-07-03 PROCEDURE — 86901 BLOOD TYPING SEROLOGIC RH(D): CPT

## 2024-07-03 PROCEDURE — 93970 EXTREMITY STUDY: CPT

## 2024-07-03 PROCEDURE — S2900: CPT

## 2024-07-03 PROCEDURE — 81001 URINALYSIS AUTO W/SCOPE: CPT

## 2024-07-03 PROCEDURE — 83540 ASSAY OF IRON: CPT

## 2024-07-03 PROCEDURE — C1889: CPT

## 2024-07-03 PROCEDURE — 71045 X-RAY EXAM CHEST 1 VIEW: CPT

## 2024-07-03 PROCEDURE — 99232 SBSQ HOSP IP/OBS MODERATE 35: CPT

## 2024-07-03 PROCEDURE — 80061 LIPID PANEL: CPT

## 2024-07-03 PROCEDURE — 85025 COMPLETE CBC W/AUTO DIFF WBC: CPT

## 2024-07-03 PROCEDURE — 85730 THROMBOPLASTIN TIME PARTIAL: CPT

## 2024-07-03 PROCEDURE — 86900 BLOOD TYPING SEROLOGIC ABO: CPT

## 2024-07-03 PROCEDURE — 83880 ASSAY OF NATRIURETIC PEPTIDE: CPT

## 2024-07-03 PROCEDURE — 84443 ASSAY THYROID STIM HORMONE: CPT

## 2024-07-03 PROCEDURE — 94640 AIRWAY INHALATION TREATMENT: CPT

## 2024-07-03 PROCEDURE — 86850 RBC ANTIBODY SCREEN: CPT

## 2024-07-03 PROCEDURE — 83605 ASSAY OF LACTIC ACID: CPT

## 2024-07-03 PROCEDURE — 87086 URINE CULTURE/COLONY COUNT: CPT

## 2024-07-03 PROCEDURE — 93306 TTE W/DOPPLER COMPLETE: CPT

## 2024-07-03 PROCEDURE — 96374 THER/PROPH/DIAG INJ IV PUSH: CPT

## 2024-07-03 PROCEDURE — 80048 BASIC METABOLIC PNL TOTAL CA: CPT

## 2024-07-03 PROCEDURE — 82962 GLUCOSE BLOOD TEST: CPT

## 2024-07-03 PROCEDURE — 83010 ASSAY OF HAPTOGLOBIN QUANT: CPT

## 2024-07-03 PROCEDURE — C9399: CPT

## 2024-07-03 PROCEDURE — 83615 LACTATE (LD) (LDH) ENZYME: CPT

## 2024-07-03 PROCEDURE — 36415 COLL VENOUS BLD VENIPUNCTURE: CPT

## 2024-07-03 PROCEDURE — 97161 PT EVAL LOW COMPLEX 20 MIN: CPT

## 2024-07-03 PROCEDURE — 71275 CT ANGIOGRAPHY CHEST: CPT | Mod: MC

## 2024-07-03 PROCEDURE — 83550 IRON BINDING TEST: CPT

## 2024-07-03 PROCEDURE — 82728 ASSAY OF FERRITIN: CPT

## 2024-07-03 PROCEDURE — 84439 ASSAY OF FREE THYROXINE: CPT

## 2024-07-03 PROCEDURE — 83036 HEMOGLOBIN GLYCOSYLATED A1C: CPT

## 2024-07-03 PROCEDURE — 80053 COMPREHEN METABOLIC PANEL: CPT

## 2024-07-03 RX ORDER — HYDROCODONE BITARTRATE AND ACETAMINOPHEN 5; 325 MG/1; MG/1
1 TABLET ORAL
Qty: 42 | Refills: 0
Start: 2024-07-03 | End: 2024-07-16

## 2024-07-03 RX ORDER — ACETAMINOPHEN 325 MG
650 TABLET ORAL EVERY 6 HOURS
Refills: 0 | Status: DISCONTINUED | OUTPATIENT
Start: 2024-07-03 | End: 2024-07-03

## 2024-07-03 RX ORDER — OXYCODONE AND ACETAMINOPHEN 5; 325 MG/1; MG/1
1 TABLET ORAL EVERY 8 HOURS
Refills: 0 | Status: DISCONTINUED | OUTPATIENT
Start: 2024-07-03 | End: 2024-07-03

## 2024-07-03 RX ORDER — METOPROLOL TARTRATE 50 MG
1 TABLET ORAL
Refills: 0 | DISCHARGE

## 2024-07-03 RX ORDER — TRAMADOL HYDROCHLORIDE 50 MG/1
0.5 TABLET, FILM COATED ORAL
Qty: 15 | Refills: 0
Start: 2024-07-03 | End: 2024-07-12

## 2024-07-03 RX ORDER — TRAMADOL HYDROCHLORIDE 50 MG/1
1 TABLET, FILM COATED ORAL
Qty: 42 | Refills: 0
Start: 2024-07-03 | End: 2024-07-16

## 2024-07-03 RX ORDER — FUROSEMIDE 10 MG/ML
20 INJECTION, SOLUTION INTRAMUSCULAR; INTRAVENOUS ONCE
Refills: 0 | Status: COMPLETED | OUTPATIENT
Start: 2024-07-03 | End: 2024-07-03

## 2024-07-03 RX ORDER — ATORVASTATIN CALCIUM 20 MG/1
1 TABLET, FILM COATED ORAL
Qty: 30 | Refills: 0
Start: 2024-07-03 | End: 2024-08-01

## 2024-07-03 RX ORDER — ACETAMINOPHEN 325 MG
2 TABLET ORAL
Qty: 112 | Refills: 0
Start: 2024-07-03 | End: 2024-07-16

## 2024-07-03 RX ORDER — METOPROLOL TARTRATE 50 MG
1 TABLET ORAL
Qty: 30 | Refills: 0
Start: 2024-07-03 | End: 2024-08-01

## 2024-07-03 RX ORDER — PANTOPRAZOLE SODIUM 40 MG/10ML
1 INJECTION, POWDER, FOR SOLUTION INTRAVENOUS
Qty: 30 | Refills: 0
Start: 2024-07-03 | End: 2024-08-01

## 2024-07-03 RX ADMIN — OXYCODONE HYDROCHLORIDE 10 MILLIGRAM(S): 100 SOLUTION ORAL at 11:45

## 2024-07-03 RX ADMIN — PIPERACILLIN SODIUM AND TAZOBACTAM SODIUM 25 GRAM(S): 3; .375 INJECTION, POWDER, LYOPHILIZED, FOR SOLUTION INTRAVENOUS at 05:09

## 2024-07-03 RX ADMIN — PREDNISONE 5 MILLIGRAM(S): 10 TABLET ORAL at 05:08

## 2024-07-03 RX ADMIN — HYDROMORPHONE HCL 0.2 MILLIGRAM(S): 0.2 INJECTION, SOLUTION INTRAVENOUS at 09:31

## 2024-07-03 RX ADMIN — HYDROMORPHONE HCL 0.2 MILLIGRAM(S): 0.2 INJECTION, SOLUTION INTRAVENOUS at 07:07

## 2024-07-03 RX ADMIN — Medication 100 MILLIGRAM(S): at 11:35

## 2024-07-03 RX ADMIN — FUROSEMIDE 20 MILLIGRAM(S): 10 INJECTION, SOLUTION INTRAMUSCULAR; INTRAVENOUS at 09:40

## 2024-07-03 RX ADMIN — HYDROMORPHONE HCL 0.2 MILLIGRAM(S): 0.2 INJECTION, SOLUTION INTRAVENOUS at 09:50

## 2024-07-03 RX ADMIN — HYDROMORPHONE HCL 0.2 MILLIGRAM(S): 0.2 INJECTION, SOLUTION INTRAVENOUS at 06:47

## 2024-07-03 RX ADMIN — OXYCODONE HYDROCHLORIDE 10 MILLIGRAM(S): 100 SOLUTION ORAL at 11:13

## 2024-07-03 RX ADMIN — OXYCODONE HYDROCHLORIDE 10 MILLIGRAM(S): 100 SOLUTION ORAL at 05:08

## 2024-07-03 RX ADMIN — Medication 50 MICROGRAM(S): at 05:08

## 2024-07-03 RX ADMIN — OXYCODONE HYDROCHLORIDE 10 MILLIGRAM(S): 100 SOLUTION ORAL at 06:09

## 2024-07-03 RX ADMIN — Medication 600 MILLIGRAM(S): at 14:58

## 2024-07-03 RX ADMIN — LIDOCAINE HCL 1 PATCH: 28 GEL TOPICAL at 03:00

## 2024-07-03 RX ADMIN — HYDROMORPHONE HCL 0.2 MILLIGRAM(S): 0.2 INJECTION, SOLUTION INTRAVENOUS at 03:30

## 2024-07-03 RX ADMIN — Medication 600 MILLIGRAM(S): at 09:46

## 2024-07-03 RX ADMIN — HYDROMORPHONE HCL 0.2 MILLIGRAM(S): 0.2 INJECTION, SOLUTION INTRAVENOUS at 05:00

## 2024-07-03 RX ADMIN — LIDOCAINE HCL 1 PATCH: 28 GEL TOPICAL at 11:34

## 2024-07-03 RX ADMIN — Medication 1200 MILLIGRAM(S): at 12:29

## 2024-07-03 RX ADMIN — PANTOPRAZOLE SODIUM 40 MILLIGRAM(S): 40 INJECTION, POWDER, FOR SOLUTION INTRAVENOUS at 06:28

## 2024-07-03 RX ADMIN — HYDROMORPHONE HCL 0.2 MILLIGRAM(S): 0.2 INJECTION, SOLUTION INTRAVENOUS at 01:00

## 2024-07-04 ENCOUNTER — TRANSCRIPTION ENCOUNTER (OUTPATIENT)
Age: 69
End: 2024-07-04

## 2024-07-07 ENCOUNTER — NON-APPOINTMENT (OUTPATIENT)
Age: 69
End: 2024-07-07

## 2024-07-07 ENCOUNTER — EMERGENCY (EMERGENCY)
Facility: HOSPITAL | Age: 69
LOS: 1 days | Discharge: HOME | End: 2024-07-07
Attending: EMERGENCY MEDICINE | Admitting: EMERGENCY MEDICINE
Payer: MEDICARE

## 2024-07-07 VITALS
HEIGHT: 63 IN | WEIGHT: 199.96 LBS | HEART RATE: 91 BPM | DIASTOLIC BLOOD PRESSURE: 78 MMHG | RESPIRATION RATE: 18 BRPM | SYSTOLIC BLOOD PRESSURE: 127 MMHG | OXYGEN SATURATION: 93 % | TEMPERATURE: 98 F

## 2024-07-07 VITALS
DIASTOLIC BLOOD PRESSURE: 85 MMHG | SYSTOLIC BLOOD PRESSURE: 132 MMHG | HEART RATE: 79 BPM | TEMPERATURE: 98 F | RESPIRATION RATE: 16 BRPM | OXYGEN SATURATION: 95 %

## 2024-07-07 LAB
ALBUMIN SERPL ELPH-MCNC: 3.5 G/DL — SIGNIFICANT CHANGE UP (ref 3.3–5)
ALP SERPL-CCNC: 117 U/L — SIGNIFICANT CHANGE UP (ref 40–120)
ALT FLD-CCNC: 187 U/L — HIGH (ref 10–45)
ANION GAP SERPL CALC-SCNC: 11 MMOL/L — SIGNIFICANT CHANGE UP (ref 5–17)
APTT BLD: 25.7 SEC — SIGNIFICANT CHANGE UP (ref 24.5–35.6)
AST SERPL-CCNC: 31 U/L — SIGNIFICANT CHANGE UP (ref 10–40)
BASOPHILS # BLD AUTO: 0.03 K/UL — SIGNIFICANT CHANGE UP (ref 0–0.2)
BASOPHILS NFR BLD AUTO: 0.2 % — SIGNIFICANT CHANGE UP (ref 0–2)
BILIRUB SERPL-MCNC: 0.3 MG/DL — SIGNIFICANT CHANGE UP (ref 0.2–1.2)
BUN SERPL-MCNC: 8 MG/DL — SIGNIFICANT CHANGE UP (ref 7–23)
CALCIUM SERPL-MCNC: 9 MG/DL — SIGNIFICANT CHANGE UP (ref 8.4–10.5)
CHLORIDE SERPL-SCNC: 95 MMOL/L — LOW (ref 96–108)
CO2 SERPL-SCNC: 31 MMOL/L — SIGNIFICANT CHANGE UP (ref 22–31)
CREAT SERPL-MCNC: 0.67 MG/DL — SIGNIFICANT CHANGE UP (ref 0.5–1.3)
EGFR: 94 ML/MIN/1.73M2 — SIGNIFICANT CHANGE UP
EOSINOPHIL # BLD AUTO: 0.31 K/UL — SIGNIFICANT CHANGE UP (ref 0–0.5)
EOSINOPHIL NFR BLD AUTO: 2.4 % — SIGNIFICANT CHANGE UP (ref 0–6)
GLUCOSE SERPL-MCNC: 105 MG/DL — HIGH (ref 70–99)
HCT VFR BLD CALC: 34.1 % — LOW (ref 34.5–45)
HGB BLD-MCNC: 10.7 G/DL — LOW (ref 11.5–15.5)
IMM GRANULOCYTES NFR BLD AUTO: 0.4 % — SIGNIFICANT CHANGE UP (ref 0–0.9)
INR BLD: 1.05 — SIGNIFICANT CHANGE UP (ref 0.85–1.18)
LYMPHOCYTES # BLD AUTO: 1.73 K/UL — SIGNIFICANT CHANGE UP (ref 1–3.3)
LYMPHOCYTES # BLD AUTO: 13.6 % — SIGNIFICANT CHANGE UP (ref 13–44)
MCHC RBC-ENTMCNC: 28.6 PG — SIGNIFICANT CHANGE UP (ref 27–34)
MCHC RBC-ENTMCNC: 31.4 GM/DL — LOW (ref 32–36)
MCV RBC AUTO: 91.2 FL — SIGNIFICANT CHANGE UP (ref 80–100)
MONOCYTES # BLD AUTO: 0.84 K/UL — SIGNIFICANT CHANGE UP (ref 0–0.9)
MONOCYTES NFR BLD AUTO: 6.6 % — SIGNIFICANT CHANGE UP (ref 2–14)
NEUTROPHILS # BLD AUTO: 9.77 K/UL — HIGH (ref 1.8–7.4)
NEUTROPHILS NFR BLD AUTO: 76.8 % — SIGNIFICANT CHANGE UP (ref 43–77)
NRBC # BLD: 0 /100 WBCS — SIGNIFICANT CHANGE UP (ref 0–0)
NT-PROBNP SERPL-SCNC: 236 PG/ML — SIGNIFICANT CHANGE UP (ref 0–300)
PLATELET # BLD AUTO: 495 K/UL — HIGH (ref 150–400)
POTASSIUM SERPL-MCNC: 3.2 MMOL/L — LOW (ref 3.5–5.3)
POTASSIUM SERPL-SCNC: 3.2 MMOL/L — LOW (ref 3.5–5.3)
PROT SERPL-MCNC: 7.1 G/DL — SIGNIFICANT CHANGE UP (ref 6–8.3)
PROTHROM AB SERPL-ACNC: 12 SEC — SIGNIFICANT CHANGE UP (ref 9.5–13)
RBC # BLD: 3.74 M/UL — LOW (ref 3.8–5.2)
RBC # FLD: 15.3 % — HIGH (ref 10.3–14.5)
SODIUM SERPL-SCNC: 137 MMOL/L — SIGNIFICANT CHANGE UP (ref 135–145)
TROPONIN T, HIGH SENSITIVITY RESULT: 11 NG/L — SIGNIFICANT CHANGE UP (ref 0–51)
WBC # BLD: 12.73 K/UL — HIGH (ref 3.8–10.5)
WBC # FLD AUTO: 12.73 K/UL — HIGH (ref 3.8–10.5)

## 2024-07-07 PROCEDURE — 71045 X-RAY EXAM CHEST 1 VIEW: CPT

## 2024-07-07 PROCEDURE — 93971 EXTREMITY STUDY: CPT | Mod: 26,RT

## 2024-07-07 PROCEDURE — 85025 COMPLETE CBC W/AUTO DIFF WBC: CPT

## 2024-07-07 PROCEDURE — 96376 TX/PRO/DX INJ SAME DRUG ADON: CPT | Mod: XU

## 2024-07-07 PROCEDURE — 83880 ASSAY OF NATRIURETIC PEPTIDE: CPT

## 2024-07-07 PROCEDURE — 71275 CT ANGIOGRAPHY CHEST: CPT | Mod: MC

## 2024-07-07 PROCEDURE — 96374 THER/PROPH/DIAG INJ IV PUSH: CPT | Mod: XU

## 2024-07-07 PROCEDURE — 93010 ELECTROCARDIOGRAM REPORT: CPT

## 2024-07-07 PROCEDURE — 80053 COMPREHEN METABOLIC PANEL: CPT

## 2024-07-07 PROCEDURE — 36415 COLL VENOUS BLD VENIPUNCTURE: CPT

## 2024-07-07 PROCEDURE — 99285 EMERGENCY DEPT VISIT HI MDM: CPT

## 2024-07-07 PROCEDURE — 93005 ELECTROCARDIOGRAM TRACING: CPT

## 2024-07-07 PROCEDURE — 84484 ASSAY OF TROPONIN QUANT: CPT

## 2024-07-07 PROCEDURE — 85610 PROTHROMBIN TIME: CPT

## 2024-07-07 PROCEDURE — 99285 EMERGENCY DEPT VISIT HI MDM: CPT | Mod: 25

## 2024-07-07 PROCEDURE — 71045 X-RAY EXAM CHEST 1 VIEW: CPT | Mod: 26

## 2024-07-07 PROCEDURE — 85730 THROMBOPLASTIN TIME PARTIAL: CPT

## 2024-07-07 PROCEDURE — 71275 CT ANGIOGRAPHY CHEST: CPT | Mod: 26,MC

## 2024-07-07 PROCEDURE — 93971 EXTREMITY STUDY: CPT

## 2024-07-07 RX ORDER — POTASSIUM CHLORIDE 600 MG/1
40 TABLET, FILM COATED, EXTENDED RELEASE ORAL ONCE
Refills: 0 | Status: COMPLETED | OUTPATIENT
Start: 2024-07-07 | End: 2024-07-07

## 2024-07-07 RX ORDER — HYDROMORPHONE HCL 0.2 MG/ML
0.5 INJECTION, SOLUTION INTRAVENOUS ONCE
Refills: 0 | Status: DISCONTINUED | OUTPATIENT
Start: 2024-07-07 | End: 2024-07-07

## 2024-07-07 RX ORDER — SODIUM CHLORIDE 0.9 % (FLUSH) 0.9 %
1000 SYRINGE (ML) INJECTION ONCE
Refills: 0 | Status: COMPLETED | OUTPATIENT
Start: 2024-07-07 | End: 2024-07-07

## 2024-07-07 RX ADMIN — HYDROMORPHONE HCL 0.5 MILLIGRAM(S): 0.2 INJECTION, SOLUTION INTRAVENOUS at 08:02

## 2024-07-07 RX ADMIN — HYDROMORPHONE HCL 0.5 MILLIGRAM(S): 0.2 INJECTION, SOLUTION INTRAVENOUS at 08:32

## 2024-07-07 RX ADMIN — HYDROMORPHONE HCL 0.5 MILLIGRAM(S): 0.2 INJECTION, SOLUTION INTRAVENOUS at 10:45

## 2024-07-07 RX ADMIN — Medication 1000 MILLILITER(S): at 08:02

## 2024-07-07 RX ADMIN — POTASSIUM CHLORIDE 40 MILLIEQUIVALENT(S): 600 TABLET, FILM COATED, EXTENDED RELEASE ORAL at 09:09

## 2024-07-08 ENCOUNTER — NON-APPOINTMENT (OUTPATIENT)
Age: 69
End: 2024-07-08

## 2024-07-08 DIAGNOSIS — R13.10 DYSPHAGIA, UNSPECIFIED: ICD-10-CM

## 2024-07-09 DIAGNOSIS — I25.10 ATHEROSCLEROTIC HEART DISEASE OF NATIVE CORONARY ARTERY WITHOUT ANGINA PECTORIS: ICD-10-CM

## 2024-07-09 DIAGNOSIS — R07.89 OTHER CHEST PAIN: ICD-10-CM

## 2024-07-09 DIAGNOSIS — Z87.891 PERSONAL HISTORY OF NICOTINE DEPENDENCE: ICD-10-CM

## 2024-07-09 DIAGNOSIS — I10 ESSENTIAL (PRIMARY) HYPERTENSION: ICD-10-CM

## 2024-07-09 DIAGNOSIS — R09.02 HYPOXEMIA: ICD-10-CM

## 2024-07-09 DIAGNOSIS — M79.661 PAIN IN RIGHT LOWER LEG: ICD-10-CM

## 2024-07-09 DIAGNOSIS — E03.9 HYPOTHYROIDISM, UNSPECIFIED: ICD-10-CM

## 2024-07-09 DIAGNOSIS — R60.0 LOCALIZED EDEMA: ICD-10-CM

## 2024-07-09 DIAGNOSIS — M06.9 RHEUMATOID ARTHRITIS, UNSPECIFIED: ICD-10-CM

## 2024-07-09 DIAGNOSIS — Z86.2 PERSONAL HISTORY OF DISEASES OF THE BLOOD AND BLOOD-FORMING ORGANS AND CERTAIN DISORDERS INVOLVING THE IMMUNE MECHANISM: ICD-10-CM

## 2024-07-09 DIAGNOSIS — R06.02 SHORTNESS OF BREATH: ICD-10-CM

## 2024-07-09 DIAGNOSIS — G25.81 RESTLESS LEGS SYNDROME: ICD-10-CM

## 2024-07-09 DIAGNOSIS — I48.0 PAROXYSMAL ATRIAL FIBRILLATION: ICD-10-CM

## 2024-07-10 ENCOUNTER — APPOINTMENT (OUTPATIENT)
Dept: RADIOLOGY | Facility: CLINIC | Age: 69
End: 2024-07-10
Payer: MEDICARE

## 2024-07-10 ENCOUNTER — APPOINTMENT (OUTPATIENT)
Dept: RADIOLOGY | Facility: CLINIC | Age: 69
End: 2024-07-10

## 2024-07-10 LAB — SURGICAL PATHOLOGY STUDY: SIGNIFICANT CHANGE UP

## 2024-07-10 PROCEDURE — 71046 X-RAY EXAM CHEST 2 VIEWS: CPT

## 2024-07-10 PROCEDURE — 74220 X-RAY XM ESOPHAGUS 1CNTRST: CPT

## 2024-07-11 ENCOUNTER — APPOINTMENT (OUTPATIENT)
Dept: THORACIC SURGERY | Facility: CLINIC | Age: 69
End: 2024-07-11
Payer: MEDICARE

## 2024-07-11 VITALS
BODY MASS INDEX: 36.32 KG/M2 | WEIGHT: 205 LBS | OXYGEN SATURATION: 93 % | TEMPERATURE: 96.7 F | HEIGHT: 63 IN | DIASTOLIC BLOOD PRESSURE: 69 MMHG | HEART RATE: 106 BPM | SYSTOLIC BLOOD PRESSURE: 131 MMHG

## 2024-07-11 DIAGNOSIS — Z09 ENCOUNTER FOR FOLLOW-UP EXAMINATION AFTER COMPLETED TREATMENT FOR CONDITIONS OTHER THAN MALIGNANT NEOPLASM: ICD-10-CM

## 2024-07-11 PROCEDURE — 99024 POSTOP FOLLOW-UP VISIT: CPT

## 2024-07-12 PROBLEM — Z09 POSTOP CHECK: Status: ACTIVE | Noted: 2024-07-12

## 2024-07-22 NOTE — REASON FOR VISIT
[Home] : at home, [unfilled] , at the time of the visit. [Medical Office: ([facility])___] : at the medical office located in  [Follow-Up: _____] : a [unfilled] follow-up visit

## 2024-07-22 NOTE — REASON FOR VISIT
[Home] : at home, [unfilled] , at the time of the visit. [Medical Office: ([facility])___] : at the medical office located in  [Follow-Up: _____] : a [unfilled] follow-up visit

## 2024-07-22 NOTE — REASON FOR VISIT
[Home] : at home, [unfilled] , at the time of the visit. [Medical Office: ([facility])___] : at the medical office located in  [Follow-Up: _____] : a [unfilled] follow-up visit

## 2024-07-25 ENCOUNTER — APPOINTMENT (OUTPATIENT)
Dept: THORACIC SURGERY | Facility: CLINIC | Age: 69
End: 2024-07-25
Payer: MEDICARE

## 2024-07-25 DIAGNOSIS — R11.2 NAUSEA WITH VOMITING, UNSPECIFIED: ICD-10-CM

## 2024-07-25 PROCEDURE — 99442: CPT | Mod: 93

## 2024-07-26 PROBLEM — R11.2 NAUSEA WITH VOMITING: Status: ACTIVE | Noted: 2024-07-26

## 2024-07-26 RX ORDER — METOCLOPRAMIDE 5 MG/1
5 TABLET ORAL EVERY 8 HOURS
Qty: 30 | Refills: 0 | Status: ACTIVE | COMMUNITY
Start: 2024-07-26 | End: 1900-01-01

## 2024-07-27 NOTE — HISTORY OF PRESENT ILLNESS
[FreeTextEntry1] : Patient is a 69 year old female, former smoker, with PMHx of RA, HTN, paroxysmal AF, restless leg syndrome, CAD, hypothyroidism, anemia, GIB, hiatal hernia, and aspiration pneumonia. She was initially referred to me by Dr. Pineda for a large hiatal hernia. She had multiple complications secondary to the hernia including aspiration and possibly GI bleeding. She could not undergo all preoperative testing and was admitted to the hospital for medical optimization and urgent hiatal hernia repair. She was taken to the OR on 7/2/24 for a robotic assisted laparoscopic hiatal hernia repair with partial fundoplication, EGD. There were no intraoperative complications. She was discharged on POD 1 (7/3/24). Eliquis was held due to history of GI bleed. She was to follow up with EP as an outpatient.  Most recently she presented to the ER on 7/7 with complaints of right lower leg pain and right sided pleuritic chest pain when taking deep breath. She underwent a CT angio chest to rule out pulmonary embolism. Imaging was negative for PE and showed typical post operative findings in the mediastinum, specifically fluid and some gas where the hernia and sac was previously. She did report difficulty with tolerating a soft solid diet. An esophagram was ordered, results showed good passage of contrast through the esophagus, into the stomach, with an intact wrap. There was some reflux shown, she was advised to continue Dexilant.   At her prior visit, she reported she tolerating her soft solid diet well. She was advised to practice portion control in the first 6 weeks post op to avoid stomach stretching and decrease risk of recurrence. She presents today for a telephone encounter to discuss diet/activity progression and potentially restarting her Eliquis.

## 2024-07-27 NOTE — ASSESSMENT
[FreeTextEntry1] : Met with patient by phone today. She had repair of her large hiatal hernia on 7/2/24. She returns for a post op visit. She reports she is doing well aside from food sticking at times. She admits that this happens when she eats too quickly. Discussed the importance of eating small amounts and slowly during the post operative period because these symptoms can occur and can increase the risk of recurrence if retching occurs. Also discussed that we were unable to get manometry because of the urgency of the procedure, so she may have underlying esophageal peristaltic dysfunction secondary to a chronic hernia and reflux, which is not uncommon.   Again, proper eating behavior post operatively was discussed and we will start her on Reglan for the time being. She will be following up with Dr. Marin, who will arrange EP follow up for her. She continues to hold her anticoagulation. We will follow up in 2 weeks, which will be 6 weeks post op, to discuss return to normal food and activities. She understood.   Plan: 1.  2 week follow up 2.  Reglan order    I, Dr. Cervantes, personally performed the evaluation and management (E/M) services for this established patient who presents today with (a) new problem(s)/exacerbation of (an) existing condition(s). That E/M includes conducting the clinically appropriate interval history &/or exam, assessing all new/exacerbated conditions, and establishing a new plan of care. Today, my JOCELYN, [name], was here to observe my evaluation and management service for this new problem/exacerbated condition and follow the plan of care established by me going forward.

## 2024-08-13 ENCOUNTER — NON-APPOINTMENT (OUTPATIENT)
Age: 69
End: 2024-08-13

## 2024-08-13 DIAGNOSIS — Z09 ENCOUNTER FOR FOLLOW-UP EXAMINATION AFTER COMPLETED TREATMENT FOR CONDITIONS OTHER THAN MALIGNANT NEOPLASM: ICD-10-CM

## 2024-08-13 DIAGNOSIS — K44.9 DIAPHRAGMATIC HERNIA W/OUT OBSTRUCTION OR GANGRENE: ICD-10-CM

## 2024-08-13 NOTE — ASSESSMENT
[FreeTextEntry1] : Patient is a 69 year old female, former smoker, with PMHx of RA, HTN, paroxysmal AF, restless leg syndrome, CAD, hypothyroidism, anemia, GIB, hiatal hernia, and aspiration pneumonia. She was initially referred to me by Dr. Pineda for a large hiatal hernia. She had multiple complications secondary to the hernia including aspiration and possibly GI bleeding. She could not undergo all preoperative testing and was admitted to the hospital for medical optimization and urgent hiatal hernia repair. She was taken to the OR on 7/2/24 for a robotic assisted laparoscopic hiatal hernia repair with partial fundoplication, EGD. There were no intraoperative complications. She was discharged on POD 1 (7/3/24). Eliquis was held due to history of GI bleed.   Most recently she presented to the ER on 7/7 with complaints of right lower leg pain and right sided pleuritic chest pain when taking deep breath. She underwent a CT angio chest to rule out pulmonary embolism. Imaging was negative for PE and showed typical post operative findings. She reported some difficulty tolerating a soft solid diet earlier in her recovery.  An esophagram was obtained that showed no issues with passage of contrast. There was some reflux shown, she was advised to continue her Dexilant.   She presents today to continue to discuss her recovery. ( re start eliquis?)

## 2024-08-13 NOTE — REASON FOR VISIT
[de-identified] : EGD, laparoscopic lysis of adhesions, laparoscopic robotic-assisted hiatal hernia repair, cruroplasty, posterior Toupet fundoplication, completion EGD [de-identified] :    Patho: 1. Hernia sac and gastric fat pad: - Fibroadipose tissue and minute benign lymph node

## 2024-08-26 ENCOUNTER — APPOINTMENT (OUTPATIENT)
Dept: HEART AND VASCULAR | Facility: CLINIC | Age: 69
End: 2024-08-26
Payer: MEDICARE

## 2024-08-26 ENCOUNTER — RX RENEWAL (OUTPATIENT)
Age: 69
End: 2024-08-26

## 2024-08-26 ENCOUNTER — NON-APPOINTMENT (OUTPATIENT)
Age: 69
End: 2024-08-26

## 2024-08-26 VITALS
BODY MASS INDEX: 34.91 KG/M2 | DIASTOLIC BLOOD PRESSURE: 54 MMHG | WEIGHT: 197 LBS | SYSTOLIC BLOOD PRESSURE: 94 MMHG | HEART RATE: 92 BPM | TEMPERATURE: 98 F | HEIGHT: 63 IN | OXYGEN SATURATION: 97 %

## 2024-08-26 VITALS — SYSTOLIC BLOOD PRESSURE: 83 MMHG | DIASTOLIC BLOOD PRESSURE: 58 MMHG

## 2024-08-26 DIAGNOSIS — R07.9 CHEST PAIN, UNSPECIFIED: ICD-10-CM

## 2024-08-26 DIAGNOSIS — Z87.891 PERSONAL HISTORY OF NICOTINE DEPENDENCE: ICD-10-CM

## 2024-08-26 DIAGNOSIS — I25.10 ATHEROSCLEROTIC HEART DISEASE OF NATIVE CORONARY ARTERY W/OUT ANGINA PECTORIS: ICD-10-CM

## 2024-08-26 DIAGNOSIS — I10 ESSENTIAL (PRIMARY) HYPERTENSION: ICD-10-CM

## 2024-08-26 DIAGNOSIS — I48.0 PAROXYSMAL ATRIAL FIBRILLATION: ICD-10-CM

## 2024-08-26 PROCEDURE — 99214 OFFICE O/P EST MOD 30 MIN: CPT

## 2024-08-26 PROCEDURE — 93000 ELECTROCARDIOGRAM COMPLETE: CPT

## 2024-08-26 RX ORDER — DICYCLOMINE HYDROCHLORIDE 10 MG/1
10 CAPSULE ORAL
Qty: 30 | Refills: 0 | Status: ACTIVE | COMMUNITY
Start: 2024-08-14

## 2024-08-26 RX ORDER — AMITRIPTYLINE HYDROCHLORIDE 100 MG/1
100 TABLET, FILM COATED ORAL
Qty: 90 | Refills: 0 | Status: ACTIVE | COMMUNITY
Start: 2023-07-25

## 2024-08-26 RX ORDER — METOPROLOL SUCCINATE 50 MG/1
50 TABLET, EXTENDED RELEASE ORAL
Qty: 90 | Refills: 0 | Status: ACTIVE | COMMUNITY
Start: 2024-04-29

## 2024-08-26 NOTE — HISTORY OF PRESENT ILLNESS
[FreeTextEntry1] : 69F w/ pAfib, CAD, HTN, hiatial hernia c/b recurrent aspiration pna now s/p hiatal hernia repair and fundoplication, rheumatoid arthritis, hypothyroidism, anemia.  8/26/24: Hx of recurrent GIB on apixaban of unclear etiology. Was seen in the hospital by EP who planned for LAAO device evaluation. Patient denies any chest pain, SOB, palpitations, orthopnea, PND or LE edema. Has been in the hospital with palpitations due to a fib RVR a few times recently as well. Today is in NSR.

## 2024-08-26 NOTE — ASSESSMENT
[FreeTextEntry1] : 69F w/ pAfib, CAD, HTN, hiatial hernia c/b recurrent aspiration pna now s/p hiatal hernia repair and fundoplication, rheumatoid arthritis, hypothyroidism, anemia here for management of a fib.  #pA fib - with recurrent GIB so not on Apixabn - no clear source has been found. Saw EP inpatient who wanted to evaluate her for a LAAO. Continue to hold AC and c/w metoprolol 50. Today she is in sinus. Refer to Dr Higginbotham Will get ziopatch to check a fib burden.   #CAD - Started atorvastatin in the hospital but she stopped this. Does not wish to add any more medications. Not clear how the dx is established.  #Hiatial her s/p repair and fundoplication - having heart burn but given cardiac risk favor stress test/cad evaluation with nuclear - she will do it when she is able.   #HTN - BP is low today. Stop her HCZT and Losartan for now

## 2024-08-29 ENCOUNTER — APPOINTMENT (OUTPATIENT)
Dept: THORACIC SURGERY | Facility: CLINIC | Age: 69
End: 2024-08-29
Payer: MEDICARE

## 2024-08-29 VITALS
SYSTOLIC BLOOD PRESSURE: 132 MMHG | BODY MASS INDEX: 35.26 KG/M2 | DIASTOLIC BLOOD PRESSURE: 66 MMHG | HEIGHT: 63 IN | TEMPERATURE: 98.3 F | WEIGHT: 199 LBS | OXYGEN SATURATION: 93 % | RESPIRATION RATE: 18 BRPM | HEART RATE: 84 BPM

## 2024-08-29 DIAGNOSIS — Z09 ENCOUNTER FOR FOLLOW-UP EXAMINATION AFTER COMPLETED TREATMENT FOR CONDITIONS OTHER THAN MALIGNANT NEOPLASM: ICD-10-CM

## 2024-08-29 PROCEDURE — 99024 POSTOP FOLLOW-UP VISIT: CPT

## 2024-08-30 NOTE — REASON FOR VISIT
[de-identified] : EGD, laparoscopic lysis of adhesions, laparoscopic robotic-assisted hiatal hernia repair, cruroplasty, posterior Toupet fundoplication, completion EGD [de-identified] : 7/1/24 [de-identified] : 8 [de-identified] :   Patho: 1. Hernia sac and gastric fat pad: - Fibroadipose tissue and minute benign lymph node

## 2024-08-30 NOTE — REASON FOR VISIT
[de-identified] : EGD, laparoscopic lysis of adhesions, laparoscopic robotic-assisted hiatal hernia repair, cruroplasty, posterior Toupet fundoplication, completion EGD [de-identified] : 7/1/24 [de-identified] : 8 [de-identified] :   Patho: 1. Hernia sac and gastric fat pad: - Fibroadipose tissue and minute benign lymph node

## 2024-08-30 NOTE — ASSESSMENT
[FreeTextEntry1] : Patient is a 69 year old female, former smoker, with PMHx of RA, HTN, paroxysmal AF, restless leg syndrome, CAD, hypothyroidism, anemia, GIB, hiatal hernia, and aspiration pneumonia. She was initially referred to me by Dr. Pineda for a large hiatal hernia. She had multiple complications secondary to the hernia including aspiration and possibly GI bleeding. She could not undergo all preoperative testing and was admitted to the hospital for medical optimization and urgent hiatal hernia repair. She was taken to the OR on 7/2/24 for a robotic assisted laparoscopic hiatal hernia repair with partial fundoplication, EGD. There were no intraoperative complications. She was discharged on POD 1 (7/3/24). Eliquis was held due to history of GI bleed.   Most recently she presented to the ER on 7/7 with complaints of right lower leg pain and right sided pleuritic chest pain when taking deep breath. She underwent a CT angio chest to rule out pulmonary embolism. Imaging was negative for PE and showed typical post operative findings. She reported some difficulty tolerating a soft solid diet earlier in her recovery.  An esophagram was obtained that showed no issues with passage of contrast. There was some reflux shown, she was advised to continue her Dexilant.   She presents today to continue to discuss her recovery. She was seen in [place] for abdominal pain. She underwent endoscopy where the endoscopist identified a Nissen fundoplication and diagnosed gastroparesis. She underwent Toupet fundoplication in July.   Discussed findings on EGD. She is asymptomatic from a dysphagia standpoint and she only experience difficulty swallowing when she eats too much or too quickly. She has no symptoms of reflux. She experiences occasional bloating. Overall she is happy with the results of surgery. Discussed the EGD finding and explained that it is impossible to diagnose gastroparesis by EGD and she can modify her diet and decrease fiber content. Otherwise informed her she could continue a general diet. I am not concerned with her recovery given her lack of symptoms at this point and we will follow up in 8 weeks. She understood and agreed.   PLAN 1.  Follow up in 8 weeks.   IArden RN, am scribing for and the presence of Dr. Clifford Cervantes the following sections: history of present illness, past medical/family/surgical/family/social history, vital signs, and disposition.  IDr. Cervantes, personally performed the evaluation and management (E/M) services for this established patient who presents today with (a) new problem(s)/exacerbation of (an) existing condition(s). That E/M includes conducting the clinically appropriate interval history &/or exam, assessing all new/exacerbated conditions, and establishing a new plan of care.

## 2024-10-01 ENCOUNTER — TRANSCRIPTION ENCOUNTER (OUTPATIENT)
Age: 69
End: 2024-10-01

## 2024-10-22 ENCOUNTER — APPOINTMENT (OUTPATIENT)
Dept: HEART AND VASCULAR | Facility: CLINIC | Age: 69
End: 2024-10-22
Payer: MEDICARE

## 2024-10-22 PROCEDURE — 78452 HT MUSCLE IMAGE SPECT MULT: CPT

## 2024-10-22 PROCEDURE — 93015 CV STRESS TEST SUPVJ I&R: CPT

## 2024-10-22 PROCEDURE — A9500: CPT

## 2024-10-28 NOTE — PRE-ANESTHESIA EVALUATION ADULT - NSANTHDIETYNSD_GEN_ALL_CORE
How Severe Is Your Skin Lesion?: moderate Has Your Skin Lesion Been Treated?: not been treated Is This A New Presentation, Or A Follow-Up?: Skin Lesion Which Family Member (Optional)?: Father Yes

## 2024-10-31 ENCOUNTER — APPOINTMENT (OUTPATIENT)
Dept: THORACIC SURGERY | Facility: CLINIC | Age: 69
End: 2024-10-31

## 2024-11-01 ENCOUNTER — APPOINTMENT (OUTPATIENT)
Dept: HEART AND VASCULAR | Facility: CLINIC | Age: 69
End: 2024-11-01

## 2024-11-01 ENCOUNTER — NON-APPOINTMENT (OUTPATIENT)
Age: 69
End: 2024-11-01

## 2024-11-01 VITALS
DIASTOLIC BLOOD PRESSURE: 75 MMHG | OXYGEN SATURATION: 93 % | BODY MASS INDEX: 33.66 KG/M2 | TEMPERATURE: 98.3 F | WEIGHT: 190 LBS | HEIGHT: 63 IN | SYSTOLIC BLOOD PRESSURE: 102 MMHG | HEART RATE: 84 BPM

## 2024-11-01 DIAGNOSIS — I48.0 PAROXYSMAL ATRIAL FIBRILLATION: ICD-10-CM

## 2024-11-01 DIAGNOSIS — K92.2 GASTROINTESTINAL HEMORRHAGE, UNSPECIFIED: ICD-10-CM

## 2024-11-01 PROCEDURE — 93000 ELECTROCARDIOGRAM COMPLETE: CPT

## 2024-11-01 PROCEDURE — 99204 OFFICE O/P NEW MOD 45 MIN: CPT

## 2024-11-06 PROBLEM — K92.2 GASTROINTESTINAL HEMORRHAGE, UNSPECIFIED GASTROINTESTINAL HEMORRHAGE TYPE: Status: ACTIVE | Noted: 2024-05-24

## 2024-11-13 ENCOUNTER — APPOINTMENT (OUTPATIENT)
Dept: THORACIC SURGERY | Facility: CLINIC | Age: 69
End: 2024-11-13

## 2024-12-05 ENCOUNTER — APPOINTMENT (OUTPATIENT)
Dept: CT IMAGING | Facility: HOSPITAL | Age: 69
End: 2024-12-05

## 2024-12-05 ENCOUNTER — OUTPATIENT (OUTPATIENT)
Dept: OUTPATIENT SERVICES | Facility: HOSPITAL | Age: 69
LOS: 1 days | End: 2024-12-05
Payer: MEDICARE

## 2024-12-05 PROCEDURE — 75572 CT HRT W/3D IMAGE: CPT

## 2024-12-05 PROCEDURE — 82565 ASSAY OF CREATININE: CPT

## 2024-12-05 PROCEDURE — 75572 CT HRT W/3D IMAGE: CPT | Mod: 26

## 2024-12-13 ENCOUNTER — APPOINTMENT (OUTPATIENT)
Dept: HEART AND VASCULAR | Facility: CLINIC | Age: 69
End: 2024-12-13

## 2024-12-13 VITALS
SYSTOLIC BLOOD PRESSURE: 103 MMHG | TEMPERATURE: 98 F | DIASTOLIC BLOOD PRESSURE: 69 MMHG | HEIGHT: 63 IN | OXYGEN SATURATION: 96 % | HEART RATE: 76 BPM

## 2024-12-13 PROCEDURE — 99214 OFFICE O/P EST MOD 30 MIN: CPT

## 2024-12-17 ENCOUNTER — OUTPATIENT (OUTPATIENT)
Dept: OUTPATIENT SERVICES | Facility: HOSPITAL | Age: 69
LOS: 1 days | Discharge: HOME | End: 2024-12-17
Payer: MEDICARE

## 2024-12-17 DIAGNOSIS — K21.9 GASTRO-ESOPHAGEAL REFLUX DISEASE W/OUT ESOPHAGITIS: ICD-10-CM

## 2024-12-17 PROCEDURE — 33285 INSJ SUBQ CAR RHYTHM MNTR: CPT

## 2024-12-17 PROCEDURE — C1764: CPT

## 2024-12-24 ENCOUNTER — OUTPATIENT (OUTPATIENT)
Dept: OUTPATIENT SERVICES | Facility: HOSPITAL | Age: 69
LOS: 1 days | End: 2024-12-24

## 2024-12-24 ENCOUNTER — APPOINTMENT (OUTPATIENT)
Dept: RADIOLOGY | Facility: HOSPITAL | Age: 69
End: 2024-12-24
Payer: MEDICARE

## 2024-12-24 PROCEDURE — 74220 X-RAY XM ESOPHAGUS 1CNTRST: CPT

## 2024-12-24 PROCEDURE — 74220 X-RAY XM ESOPHAGUS 1CNTRST: CPT | Mod: 26

## 2025-01-02 ENCOUNTER — APPOINTMENT (OUTPATIENT)
Dept: THORACIC SURGERY | Facility: CLINIC | Age: 70
End: 2025-01-02
Payer: MEDICARE

## 2025-01-02 VITALS
TEMPERATURE: 97.2 F | DIASTOLIC BLOOD PRESSURE: 51 MMHG | HEIGHT: 63 IN | SYSTOLIC BLOOD PRESSURE: 90 MMHG | OXYGEN SATURATION: 95 % | WEIGHT: 198 LBS | HEART RATE: 78 BPM | BODY MASS INDEX: 35.08 KG/M2

## 2025-01-02 PROCEDURE — 99214 OFFICE O/P EST MOD 30 MIN: CPT

## 2025-01-16 ENCOUNTER — APPOINTMENT (OUTPATIENT)
Dept: GASTROENTEROLOGY | Facility: CLINIC | Age: 70
End: 2025-01-16
Payer: MEDICARE

## 2025-01-16 DIAGNOSIS — R13.10 DYSPHAGIA, UNSPECIFIED: ICD-10-CM

## 2025-01-16 PROCEDURE — 99213 OFFICE O/P EST LOW 20 MIN: CPT

## 2025-01-28 ENCOUNTER — NON-APPOINTMENT (OUTPATIENT)
Age: 70
End: 2025-01-28

## 2025-01-28 ENCOUNTER — APPOINTMENT (OUTPATIENT)
Dept: HEART AND VASCULAR | Facility: CLINIC | Age: 70
End: 2025-01-28

## 2025-01-28 PROCEDURE — 93298 REM INTERROG DEV EVAL SCRMS: CPT

## 2025-02-03 ENCOUNTER — NON-APPOINTMENT (OUTPATIENT)
Age: 70
End: 2025-02-03

## 2025-02-03 ENCOUNTER — APPOINTMENT (OUTPATIENT)
Dept: HEART AND VASCULAR | Facility: CLINIC | Age: 70
End: 2025-02-03
Payer: MEDICARE

## 2025-02-03 VITALS
HEART RATE: 93 BPM | HEIGHT: 63 IN | SYSTOLIC BLOOD PRESSURE: 134 MMHG | WEIGHT: 192 LBS | DIASTOLIC BLOOD PRESSURE: 85 MMHG | BODY MASS INDEX: 34.02 KG/M2 | OXYGEN SATURATION: 96 %

## 2025-02-03 PROCEDURE — 99213 OFFICE O/P EST LOW 20 MIN: CPT

## 2025-02-03 PROCEDURE — 93000 ELECTROCARDIOGRAM COMPLETE: CPT

## 2025-02-03 PROCEDURE — G2211 COMPLEX E/M VISIT ADD ON: CPT

## 2025-02-04 ENCOUNTER — OUTPATIENT (OUTPATIENT)
Dept: OUTPATIENT SERVICES | Facility: HOSPITAL | Age: 70
LOS: 1 days | Discharge: HOME | End: 2025-02-04
Payer: MEDICARE

## 2025-02-04 ENCOUNTER — APPOINTMENT (OUTPATIENT)
Dept: GASTROENTEROLOGY | Facility: HOSPITAL | Age: 70
End: 2025-02-04

## 2025-02-04 VITALS
RESPIRATION RATE: 96 BRPM | DIASTOLIC BLOOD PRESSURE: 82 MMHG | WEIGHT: 190.04 LBS | HEART RATE: 77 BPM | SYSTOLIC BLOOD PRESSURE: 135 MMHG | OXYGEN SATURATION: 96 % | TEMPERATURE: 98 F | HEIGHT: 63 IN

## 2025-02-04 DIAGNOSIS — R13.10 DYSPHAGIA, UNSPECIFIED: ICD-10-CM

## 2025-02-04 PROCEDURE — 91037 ESOPH IMPED FUNCTION TEST: CPT | Mod: 26

## 2025-02-04 PROCEDURE — 91013 ESOPHGL MOTIL W/STIM/PERFUS: CPT | Mod: 26

## 2025-02-04 PROCEDURE — 91010 ESOPHAGUS MOTILITY STUDY: CPT | Mod: 26

## 2025-02-04 NOTE — ASU PREOP CHECKLIST - VERIFY SURGICAL SITE/SIDE WITH PATIENT
Abdomen , soft, nontender, nondistended , no guarding or rigidity , no masses palpable , normal bowel sounds , Liver and Spleen,  no hepatosplenomegaly , liver nontender
done

## 2025-03-06 ENCOUNTER — APPOINTMENT (OUTPATIENT)
Dept: THORACIC SURGERY | Facility: CLINIC | Age: 70
End: 2025-03-06
Payer: MEDICARE

## 2025-03-06 PROCEDURE — 99213 OFFICE O/P EST LOW 20 MIN: CPT

## 2025-03-07 ENCOUNTER — APPOINTMENT (OUTPATIENT)
Dept: HEART AND VASCULAR | Facility: CLINIC | Age: 70
End: 2025-03-07

## 2025-03-07 PROCEDURE — 93298 REM INTERROG DEV EVAL SCRMS: CPT

## 2025-04-07 ENCOUNTER — APPOINTMENT (OUTPATIENT)
Dept: HEART AND VASCULAR | Facility: CLINIC | Age: 70
End: 2025-04-07

## 2025-05-12 ENCOUNTER — APPOINTMENT (OUTPATIENT)
Dept: HEART AND VASCULAR | Facility: CLINIC | Age: 70
End: 2025-05-12
Payer: MEDICARE

## 2025-05-12 ENCOUNTER — NON-APPOINTMENT (OUTPATIENT)
Age: 70
End: 2025-05-12

## 2025-05-12 PROCEDURE — 93298 REM INTERROG DEV EVAL SCRMS: CPT

## 2025-06-16 ENCOUNTER — NON-APPOINTMENT (OUTPATIENT)
Age: 70
End: 2025-06-16

## 2025-06-16 ENCOUNTER — APPOINTMENT (OUTPATIENT)
Dept: HEART AND VASCULAR | Facility: CLINIC | Age: 70
End: 2025-06-16
Payer: MEDICARE

## 2025-06-16 PROCEDURE — 93298 REM INTERROG DEV EVAL SCRMS: CPT

## 2025-07-08 ENCOUNTER — NON-APPOINTMENT (OUTPATIENT)
Age: 70
End: 2025-07-08

## 2025-07-15 ENCOUNTER — NON-APPOINTMENT (OUTPATIENT)
Age: 70
End: 2025-07-15

## 2025-07-17 ENCOUNTER — APPOINTMENT (OUTPATIENT)
Dept: THORACIC SURGERY | Facility: CLINIC | Age: 70
End: 2025-07-17

## 2025-07-21 ENCOUNTER — APPOINTMENT (OUTPATIENT)
Dept: HEART AND VASCULAR | Facility: CLINIC | Age: 70
End: 2025-07-21

## 2025-08-12 ENCOUNTER — NON-APPOINTMENT (OUTPATIENT)
Age: 70
End: 2025-08-12

## (undated) DEVICE — SUT MONOCRYL 4-0 27" PS-2 UNDYED

## (undated) DEVICE — KIT ENDO PROCEDURE CUST W/VLV

## (undated) DEVICE — ENDOCATCH GENERAL 10MM (PURPLE)

## (undated) DEVICE — SYR LUER SLIP TIP 30CC

## (undated) DEVICE — XI VESSEL SEALER

## (undated) DEVICE — SUCTION YANKAUER NO CONTROL VENT

## (undated) DEVICE — SYR LUER LOK 5CC

## (undated) DEVICE — DRSG STERISTRIPS 0.5 X 4"

## (undated) DEVICE — GOWN SURG XXLG 4 LVL RAGLAN BREATHABLE 14/CA

## (undated) DEVICE — SUT ETHIBOND 1 30" CT-1

## (undated) DEVICE — SUT VLOC PBT 0 9" GS-21 BLUE

## (undated) DEVICE — XI OBTURATOR OPTICAL BLADELESS 8MM

## (undated) DEVICE — TROCAR APPLIED MEDICAL KII BALLOON BLUNT TIP 12MM X 100MM

## (undated) DEVICE — SYR LUER LOK 20CC

## (undated) DEVICE — PACK ROBOTIC

## (undated) DEVICE — Device

## (undated) DEVICE — TROCAR ETHICON ENDOPATH XCEL BLADELESS 5MM X 100MM STABILITY

## (undated) DEVICE — TUBING STRYKER PNEUMOCLEAR SMOKE HEAT HUMID

## (undated) DEVICE — POSITIONER FOAM EGG CRATE ULNAR 2PCS (PINK)

## (undated) DEVICE — D HELP - CLEARVIEW CLEARIFY SYSTEM

## (undated) DEVICE — DRAPE LIGHT HANDLE COVER (BLUE)

## (undated) DEVICE — ELCTR BOVIE PENCIL HANDPIECE ROCKER SWITCH 15FT

## (undated) DEVICE — XI ENDOWRIST STAPLER SHEATH

## (undated) DEVICE — DRSG GAUZE PACKTNER ROLL

## (undated) DEVICE — FOLEY HOLDER STATLOCK 2 WAY ADULT

## (undated) DEVICE — XI DRAPE COLUMN

## (undated) DEVICE — DRAIN PENROSE 5/8" X 18" SILICONE

## (undated) DEVICE — TUBING SUCTION CONN 6FT STERILE

## (undated) DEVICE — VENODYNE/SCD SLEEVE CALF MEDIUM

## (undated) DEVICE — STAPLER COVIDIEN ENDO GIA STANDARD HANDLE

## (undated) DEVICE — TROCAR ETHICON ENDOPATH XCEL BLADELESS 15MM X 100MM STABILITY

## (undated) DEVICE — ELCTR GROUNDING PAD ADULT COVIDIEN

## (undated) DEVICE — TUBING CAP SET AIR AND WATER FOR OLYMPUS SCOPE 0.4M

## (undated) DEVICE — INFLATION DEVICE BIG 60

## (undated) DEVICE — DRAPE 3/4 SHEET 52X76"

## (undated) DEVICE — WARMING BLANKET LOWER ADULT

## (undated) DEVICE — TUBING SUCTION 20FT

## (undated) DEVICE — DRSG MASTISOL

## (undated) DEVICE — SUT SILK 2-0 30" SH

## (undated) DEVICE — XI ENDOWRIST SUCTION IRRIGATOR 8MM

## (undated) DEVICE — GLV 8 PROTEXIS (WHITE)

## (undated) DEVICE — DRAPE TOWEL BLUE 17" X 24"

## (undated) DEVICE — ELCTR BOVIE TIP SPATULA MEGADYNE E-Z CLEAN LAPAROSCOPIC 13.5" STANDARD

## (undated) DEVICE — SUT TAPER POINT SH 1 1/2 CIRCLE 22MM BLUE

## (undated) DEVICE — XI DRAPE ARM

## (undated) DEVICE — FORCEP RADIAL JAW PULMONARY BIOPSY 1.8MM OD 2MM DISP

## (undated) DEVICE — TROCAR ETHICON ENDOPATH XCEL BLADELESS 12MM X 100MM SMOOTH

## (undated) DEVICE — SUT SILK 0 30" SH

## (undated) DEVICE — XI SEAL UNIVERSIAL 5-12MM

## (undated) DEVICE — SYR LUER LOK 50CC

## (undated) DEVICE — SUT VICRYL 0 27" UR-6

## (undated) DEVICE — FOLEY TRAY 16FR LF URINE METER SURESTEP